# Patient Record
Sex: MALE | Race: WHITE | NOT HISPANIC OR LATINO | URBAN - METROPOLITAN AREA
[De-identification: names, ages, dates, MRNs, and addresses within clinical notes are randomized per-mention and may not be internally consistent; named-entity substitution may affect disease eponyms.]

---

## 2017-01-01 ENCOUNTER — COMMUNICATION - HEALTHEAST (OUTPATIENT)
Dept: NURSING | Facility: CLINIC | Age: 82
End: 2017-01-01

## 2017-01-01 ENCOUNTER — AMBULATORY - HEALTHEAST (OUTPATIENT)
Dept: LAB | Facility: CLINIC | Age: 82
End: 2017-01-01

## 2017-01-01 ENCOUNTER — OFFICE VISIT - HEALTHEAST (OUTPATIENT)
Dept: INTERNAL MEDICINE | Facility: CLINIC | Age: 82
End: 2017-01-01

## 2017-01-01 ENCOUNTER — COMMUNICATION - HEALTHEAST (OUTPATIENT)
Dept: PULMONOLOGY | Facility: OTHER | Age: 82
End: 2017-01-01

## 2017-01-01 ENCOUNTER — AMBULATORY - HEALTHEAST (OUTPATIENT)
Dept: PULMONOLOGY | Facility: OTHER | Age: 82
End: 2017-01-01

## 2017-01-01 ENCOUNTER — OFFICE VISIT - HEALTHEAST (OUTPATIENT)
Dept: CARDIOLOGY | Facility: CLINIC | Age: 82
End: 2017-01-01

## 2017-01-01 ENCOUNTER — COMMUNICATION - HEALTHEAST (OUTPATIENT)
Dept: CARDIOLOGY | Facility: CLINIC | Age: 82
End: 2017-01-01

## 2017-01-01 ENCOUNTER — COMMUNICATION - HEALTHEAST (OUTPATIENT)
Dept: ONCOLOGY | Facility: CLINIC | Age: 82
End: 2017-01-01

## 2017-01-01 ENCOUNTER — AMBULATORY - HEALTHEAST (OUTPATIENT)
Dept: CARDIOLOGY | Facility: CLINIC | Age: 82
End: 2017-01-01

## 2017-01-01 ENCOUNTER — RECORDS - HEALTHEAST (OUTPATIENT)
Dept: ADMINISTRATIVE | Facility: OTHER | Age: 82
End: 2017-01-01

## 2017-01-01 ENCOUNTER — COMMUNICATION - HEALTHEAST (OUTPATIENT)
Dept: INTERNAL MEDICINE | Facility: CLINIC | Age: 82
End: 2017-01-01

## 2017-01-01 ENCOUNTER — COMMUNICATION - HEALTHEAST (OUTPATIENT)
Dept: ADMINISTRATIVE | Facility: CLINIC | Age: 82
End: 2017-01-01

## 2017-01-01 ENCOUNTER — HOSPITAL ENCOUNTER (OUTPATIENT)
Dept: INTERVENTIONAL RADIOLOGY/VASCULAR | Facility: CLINIC | Age: 82
Discharge: HOME | End: 2017-04-24
Attending: RADIOLOGY

## 2017-01-01 ENCOUNTER — HOSPITAL ENCOUNTER (OUTPATIENT)
Dept: RADIOLOGY | Facility: CLINIC | Age: 82
Discharge: HOME | End: 2017-04-24
Attending: RADIOLOGY

## 2017-01-01 ENCOUNTER — OFFICE VISIT - HEALTHEAST (OUTPATIENT)
Dept: RADIATION ONCOLOGY | Age: 82
End: 2017-01-01

## 2017-01-01 ENCOUNTER — COMMUNICATION - HEALTHEAST (OUTPATIENT)
Dept: INTERVENTIONAL RADIOLOGY/VASCULAR | Facility: CLINIC | Age: 82
End: 2017-01-01

## 2017-01-01 ENCOUNTER — AMBULATORY - HEALTHEAST (OUTPATIENT)
Dept: ONCOLOGY | Facility: HOSPITAL | Age: 82
End: 2017-01-01

## 2017-01-01 ENCOUNTER — ANESTHESIA - HEALTHEAST (OUTPATIENT)
Dept: SURGERY | Facility: AMBULATORY SURGERY CENTER | Age: 82
End: 2017-01-01

## 2017-01-01 ENCOUNTER — COMMUNICATION - HEALTHEAST (OUTPATIENT)
Dept: SCHEDULING | Facility: CLINIC | Age: 82
End: 2017-01-01

## 2017-01-01 ENCOUNTER — AMBULATORY - HEALTHEAST (OUTPATIENT)
Dept: RADIATION ONCOLOGY | Age: 82
End: 2017-01-01

## 2017-01-01 ENCOUNTER — HOSPITAL ENCOUNTER (OUTPATIENT)
Dept: CT IMAGING | Facility: HOSPITAL | Age: 82
Discharge: HOME | End: 2017-03-06
Attending: INTERNAL MEDICINE

## 2017-01-01 ENCOUNTER — OFFICE VISIT - HEALTHEAST (OUTPATIENT)
Dept: RADIATION ONCOLOGY | Facility: HOSPITAL | Age: 82
End: 2017-01-01

## 2017-01-01 ENCOUNTER — AMBULATORY - HEALTHEAST (OUTPATIENT)
Dept: ONCOLOGY | Facility: CLINIC | Age: 82
End: 2017-01-01

## 2017-01-01 ENCOUNTER — COMMUNICATION - HEALTHEAST (OUTPATIENT)
Dept: RADIATION ONCOLOGY | Facility: HOSPITAL | Age: 82
End: 2017-01-01

## 2017-01-01 ENCOUNTER — AMBULATORY - HEALTHEAST (OUTPATIENT)
Dept: INFUSION THERAPY | Facility: HOSPITAL | Age: 82
End: 2017-01-01

## 2017-01-01 ENCOUNTER — HOSPITAL ENCOUNTER (OUTPATIENT)
Dept: PET IMAGING | Facility: HOSPITAL | Age: 82
Discharge: HOME | End: 2017-04-03
Attending: RADIOLOGY

## 2017-01-01 ENCOUNTER — COMMUNICATION - HEALTHEAST (OUTPATIENT)
Dept: MEDSURG UNIT | Facility: HOSPITAL | Age: 82
End: 2017-01-01

## 2017-01-01 ENCOUNTER — OFFICE VISIT - HEALTHEAST (OUTPATIENT)
Dept: PULMONOLOGY | Facility: OTHER | Age: 82
End: 2017-01-01

## 2017-01-01 ENCOUNTER — HOSPITAL ENCOUNTER (OUTPATIENT)
Dept: RADIOLOGY | Facility: CLINIC | Age: 82
Discharge: HOME | End: 2017-04-19
Attending: RADIOLOGY

## 2017-01-01 ENCOUNTER — OFFICE VISIT - HEALTHEAST (OUTPATIENT)
Dept: ONCOLOGY | Facility: HOSPITAL | Age: 82
End: 2017-01-01

## 2017-01-01 ENCOUNTER — COMMUNICATION - HEALTHEAST (OUTPATIENT)
Dept: ONCOLOGY | Facility: HOSPITAL | Age: 82
End: 2017-01-01

## 2017-01-01 ENCOUNTER — HOSPITAL ENCOUNTER (OUTPATIENT)
Dept: CT IMAGING | Facility: HOSPITAL | Age: 82
Setting detail: RADIATION/ONCOLOGY SERIES
Discharge: STILL ADMITTED OR ADMINISTRATIVE | End: 2017-12-06
Attending: INTERNAL MEDICINE

## 2017-01-01 ENCOUNTER — HOSPITAL ENCOUNTER (OUTPATIENT)
Dept: INTERVENTIONAL RADIOLOGY/VASCULAR | Facility: CLINIC | Age: 82
Discharge: HOME | End: 2017-04-19
Attending: RADIOLOGY

## 2017-01-01 ENCOUNTER — COMMUNICATION - HEALTHEAST (OUTPATIENT)
Dept: RADIATION ONCOLOGY | Age: 82
End: 2017-01-01

## 2017-01-01 ENCOUNTER — AMBULATORY - HEALTHEAST (OUTPATIENT)
Dept: INTERNAL MEDICINE | Facility: CLINIC | Age: 82
End: 2017-01-01

## 2017-01-01 ENCOUNTER — AMBULATORY - HEALTHEAST (OUTPATIENT)
Dept: RADIATION ONCOLOGY | Facility: HOSPITAL | Age: 82
End: 2017-01-01

## 2017-01-01 ENCOUNTER — COMMUNICATION - HEALTHEAST (OUTPATIENT)
Dept: ADMINISTRATIVE | Facility: HOSPITAL | Age: 82
End: 2017-01-01

## 2017-01-01 ENCOUNTER — AMBULATORY - HEALTHEAST (OUTPATIENT)
Dept: NURSING | Facility: CLINIC | Age: 82
End: 2017-01-01

## 2017-01-01 ENCOUNTER — HOSPITAL ENCOUNTER (OUTPATIENT)
Dept: CT IMAGING | Facility: HOSPITAL | Age: 82
Discharge: HOME | End: 2017-08-09
Attending: INTERNAL MEDICINE

## 2017-01-01 ENCOUNTER — HOSPITAL ENCOUNTER (OUTPATIENT)
Dept: CT IMAGING | Facility: CLINIC | Age: 82
Discharge: HOME | End: 2017-04-24
Attending: RADIOLOGY

## 2017-01-01 ENCOUNTER — HOSPITAL ENCOUNTER (OUTPATIENT)
Dept: CT IMAGING | Facility: CLINIC | Age: 82
Discharge: HOME | End: 2017-04-19
Attending: RADIOLOGY

## 2017-01-01 DIAGNOSIS — C34.91 SQUAMOUS CELL CARCINOMA OF RIGHT LUNG (H): ICD-10-CM

## 2017-01-01 DIAGNOSIS — I48.19 PERSISTENT ATRIAL FIBRILLATION (H): ICD-10-CM

## 2017-01-01 DIAGNOSIS — C34.90 MALIGNANT NEOPLASM OF LUNG, UNSPECIFIED LATERALITY, UNSPECIFIED PART OF LUNG (H): ICD-10-CM

## 2017-01-01 DIAGNOSIS — I50.33 ACUTE ON CHRONIC DIASTOLIC CONGESTIVE HEART FAILURE (H): ICD-10-CM

## 2017-01-01 DIAGNOSIS — J47.9 BRONCHIECTASIS WITHOUT COMPLICATION (H): ICD-10-CM

## 2017-01-01 DIAGNOSIS — R06.09 DYSPNEA ON EXERTION: ICD-10-CM

## 2017-01-01 DIAGNOSIS — C67.1 MALIGNANT NEOPLASM OF DOME OF URINARY BLADDER (H): ICD-10-CM

## 2017-01-01 DIAGNOSIS — B02.9 SHINGLES: ICD-10-CM

## 2017-01-01 DIAGNOSIS — J84.10 PULMONARY FIBROSIS (H): ICD-10-CM

## 2017-01-01 DIAGNOSIS — I50.32 CHRONIC DIASTOLIC CONGESTIVE HEART FAILURE (H): ICD-10-CM

## 2017-01-01 DIAGNOSIS — E78.5 HLD (HYPERLIPIDEMIA): ICD-10-CM

## 2017-01-01 DIAGNOSIS — I10 ESSENTIAL HYPERTENSION: ICD-10-CM

## 2017-01-01 DIAGNOSIS — J70.0 RADIATION PNEUMONITIS (H): ICD-10-CM

## 2017-01-01 DIAGNOSIS — J93.11 PRIMARY SPONTANEOUS PNEUMOTHORAX: ICD-10-CM

## 2017-01-01 DIAGNOSIS — C34.91 MALIGNANT NEOPLASM OF RIGHT LUNG, UNSPECIFIED PART OF LUNG (H): ICD-10-CM

## 2017-01-01 DIAGNOSIS — I50.30 HEART FAILURE WITH PRESERVED EJECTION FRACTION (H): ICD-10-CM

## 2017-01-01 DIAGNOSIS — E03.9 HYPOTHYROIDISM, UNSPECIFIED TYPE: ICD-10-CM

## 2017-01-01 DIAGNOSIS — I48.91 ATRIAL FIBRILLATION WITH RAPID VENTRICULAR RESPONSE (H): ICD-10-CM

## 2017-01-01 DIAGNOSIS — I50.30 DIASTOLIC CONGESTIVE HEART FAILURE (H): ICD-10-CM

## 2017-01-01 DIAGNOSIS — R91.8 MASS OF UPPER LOBE OF LEFT LUNG: ICD-10-CM

## 2017-01-01 DIAGNOSIS — C67.9 BLADDER CANCER (H): ICD-10-CM

## 2017-01-01 DIAGNOSIS — I50.9 HEART FAILURE (H): ICD-10-CM

## 2017-01-01 DIAGNOSIS — I10 ESSENTIAL HYPERTENSION WITH GOAL BLOOD PRESSURE LESS THAN 140/90: ICD-10-CM

## 2017-01-01 DIAGNOSIS — I25.10 CORONARY ARTERY DISEASE INVOLVING NATIVE CORONARY ARTERY OF NATIVE HEART WITHOUT ANGINA PECTORIS: ICD-10-CM

## 2017-01-01 DIAGNOSIS — E78.00 PURE HYPERCHOLESTEROLEMIA: ICD-10-CM

## 2017-01-01 DIAGNOSIS — D09.0 CIS (CARCINOMA IN SITU OF BLADDER): ICD-10-CM

## 2017-01-01 DIAGNOSIS — J84.9 INTERSTITIAL LUNG DISEASE (H): ICD-10-CM

## 2017-01-01 DIAGNOSIS — Z71.89 ADVANCED CARE PLANNING/COUNSELING DISCUSSION: ICD-10-CM

## 2017-01-01 DIAGNOSIS — Z79.01 LONG TERM (CURRENT) USE OF ANTICOAGULANTS: ICD-10-CM

## 2017-01-01 DIAGNOSIS — E78.5 HYPERLIPIDEMIA: ICD-10-CM

## 2017-01-01 DIAGNOSIS — R91.8 LUNG NODULES: ICD-10-CM

## 2017-01-01 DIAGNOSIS — Z09 HOSPITAL DISCHARGE FOLLOW-UP: ICD-10-CM

## 2017-01-01 DIAGNOSIS — R91.1 LUNG NODULE: ICD-10-CM

## 2017-01-01 DIAGNOSIS — C34.90 LUNG CANCER (H): ICD-10-CM

## 2017-01-01 DIAGNOSIS — R13.10 DYSPHAGIA, UNSPECIFIED TYPE: ICD-10-CM

## 2017-01-01 DIAGNOSIS — Z01.818 PREOP EXAMINATION: ICD-10-CM

## 2017-01-01 DIAGNOSIS — Z79.01 CHRONIC ANTICOAGULATION: ICD-10-CM

## 2017-01-01 DIAGNOSIS — K21.9 GASTROESOPHAGEAL REFLUX DISEASE WITHOUT ESOPHAGITIS: ICD-10-CM

## 2017-01-01 DIAGNOSIS — I48.20 CHRONIC ATRIAL FIBRILLATION (H): ICD-10-CM

## 2017-01-01 DIAGNOSIS — N18.30 CKD (CHRONIC KIDNEY DISEASE), STAGE III (H): ICD-10-CM

## 2017-01-01 DIAGNOSIS — Z00.00 HEALTH CARE MAINTENANCE: ICD-10-CM

## 2017-01-01 DIAGNOSIS — I10 HTN (HYPERTENSION): ICD-10-CM

## 2017-01-01 DIAGNOSIS — J70.0: ICD-10-CM

## 2017-01-01 LAB
ATRIAL RATE - MUSE: 108 BPM
DIASTOLIC BLOOD PRESSURE - MUSE: NORMAL MMHG
INTERPRETATION ECG - MUSE: NORMAL
LAB AP CHARGES (HE HISTORICAL CONVERSION): ABNORMAL
LAB AP INITIAL CYTO EVAL (HE HISTORICAL CONVERSION): ABNORMAL
LAB MED GENERAL PATH INTERP (HE HISTORICAL CONVERSION): ABNORMAL
P AXIS - MUSE: NORMAL DEGREES
PATH REPORT.COMMENTS IMP SPEC: ABNORMAL
PATH REPORT.COMMENTS IMP SPEC: ABNORMAL
PATH REPORT.FINAL DX SPEC: ABNORMAL
PATH REPORT.MICROSCOPIC SPEC OTHER STN: ABNORMAL
PATH REPORT.RELEVANT HX SPEC: ABNORMAL
PR INTERVAL - MUSE: NORMAL MS
QRS DURATION - MUSE: 90 MS
QT - MUSE: 350 MS
QTC - MUSE: 406 MS
R AXIS - MUSE: 11 DEGREES
SPECIMEN DESCRIPTION: ABNORMAL
SYSTOLIC BLOOD PRESSURE - MUSE: NORMAL MMHG
T AXIS - MUSE: 150 DEGREES
VENTRICULAR RATE- MUSE: 81 BPM

## 2017-01-01 ASSESSMENT — MIFFLIN-ST. JEOR
SCORE: 1534.47
SCORE: 1509.53
SCORE: 1514.07
SCORE: 1490.48
SCORE: 1518.6
SCORE: 1534.47
SCORE: 1523.59
SCORE: 1545.82
SCORE: 1557.15
SCORE: 1523.14
SCORE: 1500.46
SCORE: 1527.22
SCORE: 1539.47
SCORE: 1522
SCORE: 1482.32
SCORE: 1486.85

## 2017-01-03 ENCOUNTER — AMBULATORY - HEALTHEAST (OUTPATIENT)
Dept: LAB | Facility: CLINIC | Age: 82
End: 2017-01-03

## 2017-01-03 ENCOUNTER — COMMUNICATION - HEALTHEAST (OUTPATIENT)
Dept: NURSING | Facility: CLINIC | Age: 82
End: 2017-01-03

## 2017-01-03 DIAGNOSIS — I48.19 PERSISTENT ATRIAL FIBRILLATION (H): ICD-10-CM

## 2017-01-06 ENCOUNTER — AMBULATORY - HEALTHEAST (OUTPATIENT)
Dept: RADIATION ONCOLOGY | Age: 82
End: 2017-01-06

## 2017-01-06 ENCOUNTER — OFFICE VISIT - HEALTHEAST (OUTPATIENT)
Dept: RADIATION ONCOLOGY | Age: 82
End: 2017-01-06

## 2017-01-06 DIAGNOSIS — C34.91 SQUAMOUS CELL CARCINOMA OF RIGHT LUNG (H): ICD-10-CM

## 2017-01-06 ASSESSMENT — MIFFLIN-ST. JEOR: SCORE: 1497.29

## 2017-01-09 ENCOUNTER — AMBULATORY - HEALTHEAST (OUTPATIENT)
Dept: RADIATION ONCOLOGY | Age: 82
End: 2017-01-09

## 2017-01-10 ENCOUNTER — AMBULATORY - HEALTHEAST (OUTPATIENT)
Dept: RADIATION ONCOLOGY | Age: 82
End: 2017-01-10

## 2017-01-10 DIAGNOSIS — C34.91 SQUAMOUS CELL CARCINOMA OF RIGHT LUNG (H): ICD-10-CM

## 2017-01-13 ENCOUNTER — AMBULATORY - HEALTHEAST (OUTPATIENT)
Dept: LAB | Facility: CLINIC | Age: 82
End: 2017-01-13

## 2017-01-13 ENCOUNTER — COMMUNICATION - HEALTHEAST (OUTPATIENT)
Dept: NURSING | Facility: CLINIC | Age: 82
End: 2017-01-13

## 2017-01-13 DIAGNOSIS — I48.19 PERSISTENT ATRIAL FIBRILLATION (H): ICD-10-CM

## 2017-01-16 ENCOUNTER — COMMUNICATION - HEALTHEAST (OUTPATIENT)
Dept: RADIATION ONCOLOGY | Age: 82
End: 2017-01-16

## 2017-01-19 ENCOUNTER — AMBULATORY - HEALTHEAST (OUTPATIENT)
Dept: LAB | Facility: CLINIC | Age: 82
End: 2017-01-19

## 2017-01-19 ENCOUNTER — COMMUNICATION - HEALTHEAST (OUTPATIENT)
Dept: NURSING | Facility: CLINIC | Age: 82
End: 2017-01-19

## 2017-01-19 DIAGNOSIS — I48.19 PERSISTENT ATRIAL FIBRILLATION (H): ICD-10-CM

## 2017-01-25 ENCOUNTER — AMBULATORY - HEALTHEAST (OUTPATIENT)
Dept: LAB | Facility: CLINIC | Age: 82
End: 2017-01-25

## 2017-01-25 ENCOUNTER — COMMUNICATION - HEALTHEAST (OUTPATIENT)
Dept: NURSING | Facility: CLINIC | Age: 82
End: 2017-01-25

## 2017-01-25 DIAGNOSIS — I48.19 PERSISTENT ATRIAL FIBRILLATION (H): ICD-10-CM

## 2017-01-30 ENCOUNTER — OFFICE VISIT - HEALTHEAST (OUTPATIENT)
Dept: PULMONOLOGY | Facility: OTHER | Age: 82
End: 2017-01-30

## 2017-01-30 ENCOUNTER — AMBULATORY - HEALTHEAST (OUTPATIENT)
Dept: LAB | Facility: CLINIC | Age: 82
End: 2017-01-30

## 2017-01-30 ENCOUNTER — COMMUNICATION - HEALTHEAST (OUTPATIENT)
Dept: NURSING | Facility: CLINIC | Age: 82
End: 2017-01-30

## 2017-01-30 DIAGNOSIS — I48.19 PERSISTENT ATRIAL FIBRILLATION (H): ICD-10-CM

## 2017-01-30 DIAGNOSIS — J84.10 PULMONARY FIBROSIS (H): ICD-10-CM

## 2017-01-30 DIAGNOSIS — C34.90 LUNG CANCER (H): ICD-10-CM

## 2017-02-03 ENCOUNTER — COMMUNICATION - HEALTHEAST (OUTPATIENT)
Dept: NURSING | Facility: CLINIC | Age: 82
End: 2017-02-03

## 2017-02-03 ENCOUNTER — AMBULATORY - HEALTHEAST (OUTPATIENT)
Dept: LAB | Facility: CLINIC | Age: 82
End: 2017-02-03

## 2017-02-03 DIAGNOSIS — I48.19 PERSISTENT ATRIAL FIBRILLATION (H): ICD-10-CM

## 2017-02-09 ENCOUNTER — AMBULATORY - HEALTHEAST (OUTPATIENT)
Dept: LAB | Facility: CLINIC | Age: 82
End: 2017-02-09

## 2017-02-09 ENCOUNTER — COMMUNICATION - HEALTHEAST (OUTPATIENT)
Dept: NURSING | Facility: CLINIC | Age: 82
End: 2017-02-09

## 2017-02-09 DIAGNOSIS — I48.19 PERSISTENT ATRIAL FIBRILLATION (H): ICD-10-CM

## 2017-02-16 ENCOUNTER — COMMUNICATION - HEALTHEAST (OUTPATIENT)
Dept: NURSING | Facility: CLINIC | Age: 82
End: 2017-02-16

## 2017-02-16 ENCOUNTER — AMBULATORY - HEALTHEAST (OUTPATIENT)
Dept: LAB | Facility: CLINIC | Age: 82
End: 2017-02-16

## 2017-02-16 DIAGNOSIS — I48.19 PERSISTENT ATRIAL FIBRILLATION (H): ICD-10-CM

## 2017-02-17 ENCOUNTER — AMBULATORY - HEALTHEAST (OUTPATIENT)
Dept: PULMONOLOGY | Facility: OTHER | Age: 82
End: 2017-02-17

## 2017-02-17 ENCOUNTER — COMMUNICATION - HEALTHEAST (OUTPATIENT)
Dept: INTERNAL MEDICINE | Facility: CLINIC | Age: 82
End: 2017-02-17

## 2017-02-17 DIAGNOSIS — I48.91 ATRIAL FIBRILLATION WITH RAPID VENTRICULAR RESPONSE (H): ICD-10-CM

## 2017-02-17 DIAGNOSIS — J84.10 PULMONARY FIBROSIS (H): ICD-10-CM

## 2017-02-23 ENCOUNTER — COMMUNICATION - HEALTHEAST (OUTPATIENT)
Dept: INTERNAL MEDICINE | Facility: CLINIC | Age: 82
End: 2017-02-23

## 2017-02-23 ENCOUNTER — AMBULATORY - HEALTHEAST (OUTPATIENT)
Dept: LAB | Facility: CLINIC | Age: 82
End: 2017-02-23

## 2017-02-23 ENCOUNTER — AMBULATORY - HEALTHEAST (OUTPATIENT)
Dept: PULMONOLOGY | Facility: OTHER | Age: 82
End: 2017-02-23

## 2017-02-23 DIAGNOSIS — I48.19 PERSISTENT ATRIAL FIBRILLATION (H): ICD-10-CM

## 2017-02-23 DIAGNOSIS — J84.10 PULMONARY FIBROSIS (H): ICD-10-CM

## 2017-02-23 DIAGNOSIS — I48.91 ATRIAL FIBRILLATION WITH RAPID VENTRICULAR RESPONSE (H): ICD-10-CM

## 2018-01-01 ENCOUNTER — HOME CARE/HOSPICE - HEALTHEAST (OUTPATIENT)
Dept: HOME HEALTH SERVICES | Facility: HOME HEALTH | Age: 83
End: 2018-01-01

## 2018-01-01 ENCOUNTER — COMMUNICATION - HEALTHEAST (OUTPATIENT)
Dept: ONCOLOGY | Facility: CLINIC | Age: 83
End: 2018-01-01

## 2018-01-01 ENCOUNTER — COMMUNICATION - HEALTHEAST (OUTPATIENT)
Dept: PULMONOLOGY | Facility: OTHER | Age: 83
End: 2018-01-01

## 2018-01-01 ENCOUNTER — HOME CARE/HOSPICE - HEALTHEAST (OUTPATIENT)
Dept: HOSPICE | Facility: HOSPICE | Age: 83
End: 2018-01-01

## 2018-01-01 ENCOUNTER — OFFICE VISIT - HEALTHEAST (OUTPATIENT)
Dept: GERIATRICS | Facility: CLINIC | Age: 83
End: 2018-01-01

## 2018-01-01 ENCOUNTER — RECORDS - HEALTHEAST (OUTPATIENT)
Dept: ADMINISTRATIVE | Facility: OTHER | Age: 83
End: 2018-01-01

## 2018-01-01 ENCOUNTER — COMMUNICATION - HEALTHEAST (OUTPATIENT)
Dept: GERIATRICS | Facility: CLINIC | Age: 83
End: 2018-01-01

## 2018-01-01 ENCOUNTER — AMBULATORY - HEALTHEAST (OUTPATIENT)
Dept: INTERNAL MEDICINE | Facility: CLINIC | Age: 83
End: 2018-01-01

## 2018-01-01 ENCOUNTER — COMMUNICATION - HEALTHEAST (OUTPATIENT)
Dept: CARDIOLOGY | Facility: CLINIC | Age: 83
End: 2018-01-01

## 2018-01-01 ENCOUNTER — COMMUNICATION - HEALTHEAST (OUTPATIENT)
Dept: HOME HEALTH SERVICES | Facility: HOME HEALTH | Age: 83
End: 2018-01-01

## 2018-01-01 ENCOUNTER — COMMUNICATION - HEALTHEAST (OUTPATIENT)
Dept: INTERNAL MEDICINE | Facility: CLINIC | Age: 83
End: 2018-01-01

## 2018-01-01 ENCOUNTER — AMBULATORY - HEALTHEAST (OUTPATIENT)
Dept: NURSING | Facility: CLINIC | Age: 83
End: 2018-01-01

## 2018-01-01 ENCOUNTER — OFFICE VISIT - HEALTHEAST (OUTPATIENT)
Dept: PULMONOLOGY | Facility: OTHER | Age: 83
End: 2018-01-01

## 2018-01-01 ENCOUNTER — RECORDS - HEALTHEAST (OUTPATIENT)
Dept: LAB | Facility: CLINIC | Age: 83
End: 2018-01-01

## 2018-01-01 ENCOUNTER — COMMUNICATION - HEALTHEAST (OUTPATIENT)
Dept: SCHEDULING | Facility: CLINIC | Age: 83
End: 2018-01-01

## 2018-01-01 ENCOUNTER — COMMUNICATION - HEALTHEAST (OUTPATIENT)
Dept: NURSING | Facility: CLINIC | Age: 83
End: 2018-01-01

## 2018-01-01 ENCOUNTER — AMBULATORY - HEALTHEAST (OUTPATIENT)
Dept: LAB | Facility: CLINIC | Age: 83
End: 2018-01-01

## 2018-01-01 ENCOUNTER — AMBULATORY - HEALTHEAST (OUTPATIENT)
Dept: CARDIOLOGY | Facility: CLINIC | Age: 83
End: 2018-01-01

## 2018-01-01 ENCOUNTER — AMBULATORY - HEALTHEAST (OUTPATIENT)
Dept: HOSPICE | Facility: HOSPICE | Age: 83
End: 2018-01-01

## 2018-01-01 ENCOUNTER — OFFICE VISIT - HEALTHEAST (OUTPATIENT)
Dept: CARDIOLOGY | Facility: CLINIC | Age: 83
End: 2018-01-01

## 2018-01-01 ENCOUNTER — AMBULATORY - HEALTHEAST (OUTPATIENT)
Dept: GERIATRICS | Facility: CLINIC | Age: 83
End: 2018-01-01

## 2018-01-01 ENCOUNTER — SURGERY - HEALTHEAST (OUTPATIENT)
Dept: SURGERY | Facility: HOSPITAL | Age: 83
End: 2018-01-01

## 2018-01-01 ENCOUNTER — ANESTHESIA - HEALTHEAST (OUTPATIENT)
Dept: SURGERY | Facility: HOSPITAL | Age: 83
End: 2018-01-01

## 2018-01-01 ENCOUNTER — SURGERY - HEALTHEAST (OUTPATIENT)
Dept: CARDIOLOGY | Facility: CLINIC | Age: 83
End: 2018-01-01

## 2018-01-01 ENCOUNTER — OFFICE VISIT - HEALTHEAST (OUTPATIENT)
Dept: INTERNAL MEDICINE | Facility: CLINIC | Age: 83
End: 2018-01-01

## 2018-01-01 DIAGNOSIS — J84.10 PULMONARY FIBROSIS (H): ICD-10-CM

## 2018-01-01 DIAGNOSIS — I48.21 PERMANENT ATRIAL FIBRILLATION (H): ICD-10-CM

## 2018-01-01 DIAGNOSIS — I95.9 HYPOTENSION: ICD-10-CM

## 2018-01-01 DIAGNOSIS — C34.90 MALIGNANT NEOPLASM OF LUNG, UNSPECIFIED LATERALITY, UNSPECIFIED PART OF LUNG (H): ICD-10-CM

## 2018-01-01 DIAGNOSIS — R06.09 DYSPNEA ON EXERTION: ICD-10-CM

## 2018-01-01 DIAGNOSIS — I49.5 TACHYCARDIA-BRADYCARDIA (H): ICD-10-CM

## 2018-01-01 DIAGNOSIS — R00.1 BRADYCARDIA: ICD-10-CM

## 2018-01-01 DIAGNOSIS — Z95.0 STATUS POST PLACEMENT OF CARDIAC PACEMAKER: ICD-10-CM

## 2018-01-01 DIAGNOSIS — I48.19 PERSISTENT ATRIAL FIBRILLATION (H): ICD-10-CM

## 2018-01-01 DIAGNOSIS — J70.0 RADIATION PNEUMONITIS (H): ICD-10-CM

## 2018-01-01 DIAGNOSIS — E78.00 PURE HYPERCHOLESTEROLEMIA: ICD-10-CM

## 2018-01-01 DIAGNOSIS — I50.30 HEART FAILURE WITH PRESERVED EJECTION FRACTION (H): ICD-10-CM

## 2018-01-01 DIAGNOSIS — R33.9 URINE RETENTION: ICD-10-CM

## 2018-01-01 DIAGNOSIS — Z51.81 ANTICOAGULATION MANAGEMENT ENCOUNTER: ICD-10-CM

## 2018-01-01 DIAGNOSIS — C34.91 SQUAMOUS CELL LUNG CANCER, RIGHT (H): ICD-10-CM

## 2018-01-01 DIAGNOSIS — I10 HYPERTENSION: ICD-10-CM

## 2018-01-01 DIAGNOSIS — S51.812A SKIN TEAR OF FOREARM WITHOUT COMPLICATION, LEFT, INITIAL ENCOUNTER: ICD-10-CM

## 2018-01-01 DIAGNOSIS — Z79.01 ANTICOAGULATION MANAGEMENT ENCOUNTER: ICD-10-CM

## 2018-01-01 DIAGNOSIS — K21.9 GASTROESOPHAGEAL REFLUX DISEASE WITHOUT ESOPHAGITIS: ICD-10-CM

## 2018-01-01 DIAGNOSIS — I48.91 ATRIAL FIBRILLATION (H): ICD-10-CM

## 2018-01-01 DIAGNOSIS — R06.09 DOE (DYSPNEA ON EXERTION): ICD-10-CM

## 2018-01-01 DIAGNOSIS — Z95.0 CARDIAC PACEMAKER IN SITU: ICD-10-CM

## 2018-01-01 DIAGNOSIS — I95.1 ORTHOSTATIC HYPOTENSION: ICD-10-CM

## 2018-01-01 DIAGNOSIS — I25.10 CORONARY ARTERY DISEASE INVOLVING NATIVE CORONARY ARTERY OF NATIVE HEART WITHOUT ANGINA PECTORIS: ICD-10-CM

## 2018-01-01 DIAGNOSIS — R06.09 EXERTIONAL DYSPNEA: ICD-10-CM

## 2018-01-01 DIAGNOSIS — R53.1 WEAKNESS: ICD-10-CM

## 2018-01-01 DIAGNOSIS — R06.02 SHORTNESS OF BREATH: ICD-10-CM

## 2018-01-01 DIAGNOSIS — I49.5 TACHYCARDIA-BRADYCARDIA SYNDROME (H): ICD-10-CM

## 2018-01-01 DIAGNOSIS — I10 ESSENTIAL HYPERTENSION: ICD-10-CM

## 2018-01-01 DIAGNOSIS — I50.32 CHRONIC DIASTOLIC CHF (CONGESTIVE HEART FAILURE), NYHA CLASS 3 (H): ICD-10-CM
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
## 2018-01-01 LAB
DIGOXIN LEVEL LHE- HISTORICAL: 1.3 NG/ML (ref 0.5–2)
HCC DEVICE COMMENTS: NORMAL
HCC DEVICE COMMENTS: NORMAL
INR PPP: 2 (ref 0.9–1.1)
INR PPP: 2.1 (ref 0.9–1.1)
INR PPP: 2.2 (ref 0.9–1.1)
INR PPP: 2.5 (ref 0.9–1.1)
INR PPP: 4.4 (ref 0.9–1.1)

## 2018-01-01 RX ORDER — SORBITOL SOLUTION 70 %
20 SOLUTION, ORAL MISCELLANEOUS 2 TIMES DAILY
Status: SHIPPED | COMMUNITY
Start: 2018-01-01

## 2018-01-01 RX ORDER — HALOPERIDOL 2 MG/ML
0.5-2 SOLUTION ORAL EVERY 4 HOURS PRN
Status: SHIPPED | COMMUNITY
Start: 2018-01-01

## 2018-01-01 RX ORDER — HYDROMORPHONE HYDROCHLORIDE 1 MG/ML
3 SOLUTION ORAL EVERY 4 HOURS
Status: SHIPPED | COMMUNITY
Start: 2018-01-01

## 2018-01-01 RX ORDER — LORAZEPAM 0.5 MG/1
0.5 TABLET ORAL EVERY 4 HOURS PRN
Status: SHIPPED | COMMUNITY
Start: 2018-01-01

## 2018-01-01 RX ORDER — HYDROMORPHONE HYDROCHLORIDE 1 MG/ML
3 SOLUTION ORAL
Status: SHIPPED | COMMUNITY
Start: 2018-01-01

## 2018-01-01 RX ORDER — BISACODYL 10 MG
10 SUPPOSITORY, RECTAL RECTAL DAILY PRN
Status: SHIPPED | COMMUNITY
Start: 2018-01-01

## 2018-01-01 RX ORDER — ATROPINE SULFATE 10 MG/ML
2 SOLUTION/ DROPS OPHTHALMIC EVERY 4 HOURS PRN
Status: SHIPPED | COMMUNITY
Start: 2018-01-01

## 2018-01-01 ASSESSMENT — MIFFLIN-ST. JEOR
SCORE: 1450.57
SCORE: 1436.96
SCORE: 1446.03
SCORE: 1491.39

## 2018-03-23 ENCOUNTER — COMMUNICATION - HEALTHEAST (OUTPATIENT)
Dept: NURSING | Facility: CLINIC | Age: 83
End: 2018-03-23

## 2021-05-27 ENCOUNTER — RECORDS - HEALTHEAST (OUTPATIENT)
Dept: ADMINISTRATIVE | Facility: CLINIC | Age: 86
End: 2021-05-27

## 2021-05-30 VITALS — BODY MASS INDEX: 26.04 KG/M2 | HEIGHT: 71 IN | WEIGHT: 186 LBS

## 2021-05-30 VITALS — BODY MASS INDEX: 25.47 KG/M2 | WEIGHT: 181.9 LBS | HEIGHT: 71 IN

## 2021-05-30 VITALS — WEIGHT: 175.3 LBS | BODY MASS INDEX: 24.54 KG/M2 | HEIGHT: 71 IN

## 2021-05-30 VITALS — WEIGHT: 185 LBS | HEIGHT: 72 IN | BODY MASS INDEX: 25.06 KG/M2

## 2021-05-30 VITALS — BODY MASS INDEX: 24.06 KG/M2 | HEIGHT: 72 IN | WEIGHT: 177.6 LBS

## 2021-05-30 VITALS — WEIGHT: 185.6 LBS | BODY MASS INDEX: 25.89 KG/M2

## 2021-05-30 VITALS — WEIGHT: 185.2 LBS | BODY MASS INDEX: 25.83 KG/M2

## 2021-05-30 VITALS — WEIGHT: 180 LBS | HEIGHT: 72 IN | BODY MASS INDEX: 24.38 KG/M2

## 2021-05-30 VITALS — BODY MASS INDEX: 25.34 KG/M2 | WEIGHT: 181 LBS | HEIGHT: 71 IN

## 2021-05-30 VITALS — WEIGHT: 184.6 LBS | HEIGHT: 71 IN | BODY MASS INDEX: 25.84 KG/M2

## 2021-05-30 VITALS — WEIGHT: 188.4 LBS | BODY MASS INDEX: 25.55 KG/M2

## 2021-05-30 VITALS — BODY MASS INDEX: 25.84 KG/M2 | WEIGHT: 185.3 LBS

## 2021-05-31 VITALS — WEIGHT: 173.9 LBS | BODY MASS INDEX: 23.92 KG/M2

## 2021-05-31 VITALS — HEIGHT: 71 IN | BODY MASS INDEX: 24.33 KG/M2 | WEIGHT: 173.8 LBS

## 2021-05-31 VITALS — BODY MASS INDEX: 25.2 KG/M2 | HEIGHT: 71 IN | WEIGHT: 180 LBS

## 2021-05-31 VITALS — HEIGHT: 71 IN | BODY MASS INDEX: 24.22 KG/M2 | WEIGHT: 173 LBS

## 2021-05-31 VITALS — WEIGHT: 179 LBS | HEIGHT: 71 IN | BODY MASS INDEX: 25.06 KG/M2

## 2021-05-31 VITALS — BODY MASS INDEX: 23.45 KG/M2 | WEIGHT: 168.1 LBS

## 2021-05-31 VITALS — WEIGHT: 185.3 LBS | BODY MASS INDEX: 25.84 KG/M2

## 2021-05-31 VITALS — WEIGHT: 184.6 LBS | BODY MASS INDEX: 25.75 KG/M2

## 2021-05-31 VITALS — BODY MASS INDEX: 22.94 KG/M2 | WEIGHT: 164.5 LBS | WEIGHT: 164.5 LBS | BODY MASS INDEX: 22.94 KG/M2

## 2021-05-31 VITALS — HEIGHT: 71 IN | BODY MASS INDEX: 23.91 KG/M2 | BODY MASS INDEX: 23.91 KG/M2 | HEIGHT: 71 IN

## 2021-05-31 VITALS — BODY MASS INDEX: 23.91 KG/M2 | WEIGHT: 171.4 LBS

## 2021-05-31 VITALS — WEIGHT: 172 LBS | HEIGHT: 71 IN | BODY MASS INDEX: 24.08 KG/M2

## 2021-05-31 VITALS — WEIGHT: 179 LBS | BODY MASS INDEX: 24.24 KG/M2 | HEIGHT: 72 IN

## 2021-05-31 VITALS — BODY MASS INDEX: 24.27 KG/M2 | WEIGHT: 174 LBS

## 2021-05-31 VITALS — BODY MASS INDEX: 24.83 KG/M2 | WEIGHT: 178 LBS

## 2021-05-31 VITALS — BODY MASS INDEX: 24.64 KG/M2 | HEIGHT: 71 IN | WEIGHT: 176 LBS

## 2021-05-31 VITALS — BODY MASS INDEX: 22.96 KG/M2 | HEIGHT: 71 IN | WEIGHT: 164 LBS

## 2021-05-31 VITALS — WEIGHT: 178 LBS | HEIGHT: 71 IN | BODY MASS INDEX: 24.7 KG/M2 | BODY MASS INDEX: 24.92 KG/M2 | WEIGHT: 177.1 LBS

## 2021-05-31 VITALS — BODY MASS INDEX: 22.87 KG/M2 | WEIGHT: 164 LBS

## 2021-05-31 VITALS — BODY MASS INDEX: 24.42 KG/M2 | WEIGHT: 175.1 LBS

## 2021-05-31 VITALS — WEIGHT: 160.6 LBS | BODY MASS INDEX: 22.4 KG/M2

## 2021-05-31 VITALS — WEIGHT: 172 LBS | BODY MASS INDEX: 23.99 KG/M2

## 2021-06-01 ENCOUNTER — RECORDS - HEALTHEAST (OUTPATIENT)
Dept: ADMINISTRATIVE | Facility: CLINIC | Age: 86
End: 2021-06-01

## 2021-06-01 VITALS — HEIGHT: 71 IN | BODY MASS INDEX: 22.68 KG/M2 | WEIGHT: 162 LBS

## 2021-06-01 VITALS — BODY MASS INDEX: 24.36 KG/M2 | WEIGHT: 174 LBS | HEIGHT: 71 IN

## 2021-06-01 VITALS — BODY MASS INDEX: 23.1 KG/M2 | HEIGHT: 71 IN | WEIGHT: 165 LBS

## 2021-06-01 VITALS — BODY MASS INDEX: 24.27 KG/M2 | WEIGHT: 174 LBS

## 2021-06-01 VITALS — WEIGHT: 172.4 LBS | BODY MASS INDEX: 24.04 KG/M2

## 2021-06-02 ENCOUNTER — RECORDS - HEALTHEAST (OUTPATIENT)
Dept: ADMINISTRATIVE | Facility: CLINIC | Age: 86
End: 2021-06-02

## 2021-06-08 NOTE — PROGRESS NOTES
Cyberknife  Treatment Summary    Kartik Tam   [id number]  1935  Care Provider: Joan Patel    Date of Service: 1/10/2017      HISTORY: Kartik Tam was treated with Cyberknife stereotactic radiosurgery    SITE TREATED: Right lower lobe lung cancer  TOTAL DOSE: 5000 cGy  NUMBER OF FRACTION: 5  DATES COMPLETED: December 28, 2016 through January 9, 2017    SITE TREATED: Right upper lobe lung cancer  TOTAL DOSE: 4800 cGy  NUMBER OF FRACTION: 4  DATES COMPLETED: December 16, 2016 through December 23, 2016    Kartik tolerated the treatment without unexpected sides effects.          PLAN: Discharge instructions were given and Kartik knows to call if questions/issues arise. Kartik will be seen in f/u in 3 months with a scan.

## 2021-06-08 NOTE — PROGRESS NOTES
"  RADIATION ONCOLOGY WEEKLY TREATMENT VISIT NOTE        Assessment / Impression       1. Squamous cell carcinoma of right lung       Squamous cell carcinoma of right lung    Staging form: Lung, AJCC 7th Edition      Clinical: No stage assigned - Unsigned      Pathologic stage from 11/7/2016: Stage IIIA (T4, N0, cM0) - Signed by John Andre MD on 11/7/2016    Tolerating radiation therapy well.  All questions and concerns addressed.      Plan:     Continue radiation treatment as prescribed.  Radiation: Site: RLL  Stereotactic Radiosurgery: Yes  Stereotactic Radiosurgery date: 01/06/17  Concurrent Therapy: No  Today's Dose: 4000  Total Dose for Thoracic: 5000  Today's Fraction/Total Fraction Thoracic: 4/5      Subjective:      HPI: Kartik Tam is a 81 y.o. male with    1. Squamous cell carcinoma of right lung         The following portions of the patient's history were reviewed and updated as appropriate: allergies, current medications, past family history, past medical history, past social history, past surgical history and problem list.      Objective:    Exam:     General: Alert and oriented, in no acute distress  Vitals:    01/06/17 1048   BP: 159/90   Pulse: (!) 53   Temp: 97  F (36.1  C)   TempSrc: Oral   SpO2: 100%   Weight: 175 lb 4.8 oz (79.5 kg)   Height: 5' 11\" (1.803 m)     Kartik has no Erythema.    Labs:  Results for orders placed or performed in visit on 01/03/17   INR   Result Value Ref Range    INR 2.50 (H) 0.90 - 1.10         Treatment Summary to Date    Aria chart and setup information reviewed    Joan Patel MD   "

## 2021-06-08 NOTE — PROGRESS NOTES
PULMONARY OUTPATIENT FOLLOW UP NOTE    Assessment:     1. Lung cancer  PET (+) RLL mass, RUL and LLL nodules. (+) mediastinal adenopathy.   S/p EBUS with negative pathology results.   S/p wedge biopsy of RLL mass and RUL nodule, path (+) Squamous cell lung cancer. Stage IIIA  S/p stereotactic radiosurgery.  Follow up PET CT scan 4/2017  2. Pulmonary fibrosis  Lung biopsy was positive for usual interstitial pneumonia. Labs showed (+) TETE, elevated anti Sm antibody  PFTs showed mild to moderate restrictive lung disease and moderate reduction of DLCO. No exertional hypoxia   Continue prednisone 5 mg daily  3. Stable loculated pneumothorax at the staple site in RUL post wedge biopsy   4. Carcinoma in situ of bladder   5. Ischemic cardiomyopathy s/p drug eluting stent Synergy proximal LAD on 8/2/2016  6. Paroxysmal atrial fibrillation anticoagulated  7. Hx hypothyroidism     Plan:   1. Prednisone 5 mg daily  2. Continue albuterol HFA as needed  3. Follow up in 6 months     Chad Pham  Pulmonary / Critical Care  1/30/2017    CC:      Chief Complaint   Patient presents with     Follow Up     lung CA/pulm. fibrosis       HPI:       Kartik Tam is an 81 y.o. male who presents for follow up.  Pt has history of ischemic cardiomyopathy s/p stent proximal LAD 8/2/2016, atrial fibrillation on coumadin, hypothyroidism, carcinomal in situ bladder of bladder, squamous cell lung cancer stage IIIA s/p radiation therapy, pulmonary fibrosis.   Patient finished course of radiation therapy. Doing well. Steroids were taper to 10 mg daily. Stopped using LABA/ICS , pt denies any significant change in breathing.    Reports no limitation with activities , able to walk for one hour without stop, denies cough or wheezes.   Denies chest pain, orthopnea, PND or swelling of LEs.   Reports occasionally feeling off balance when standing up.   Adequate appetite.   Denies acid reflux symptoms. No postnasal drip.   Pt reports  previous tobacco user, 1-2 ppd for 30 years, quit 35 years ago. Worked in sales.  Family hx significant for two brothers with malignancy, one with throat cancer and other with colon cancer.     Past Medical History   Diagnosis Date     A-fib 12/5/2014     Has had hx of in the past     Bronchiectasis RLL      Cancer      Hx. of Bladder Cancer     Cataract      Colon polyp      Coronary artery disease      Dementia?      DJD (degenerative joint disease) hips      Hearing impaired + tinnitus      Herpes      Hyperlipidemia      Hypertension      Hypothyroidism      hypo     Lung cancer      Lung nodule      RLL     Micturition syncope      Osteopenia      Pneumothorax      Pulmonary fibrosis      Medications:     Prior to Admission medications    Medication Sig Start Date End Date Taking? Authorizing Provider   dronedarone (MULTAQ) 400 mg tablet Take 1 tablet (400 mg total) by mouth daily. 10/12/15  Yes Agnes Hicks MD   LEVOTHYROXINE SODIUM (LEVOTHYROXINE, BULK, MISC) Take 150 mcg by mouth every morning.    Yes Historical Provider, MD   simvastatin (ZOCOR) 20 MG tablet Take 20 mg by mouth bedtime.   Yes Historical Provider, MD   warfarin (COUMADIN) 5 MG tablet Please take 5 g on 12/6/2014 and 12/7/2014. 12/6/14 12/6/15 Yes Ilana aJrrett MD   doxycycline (VIBRAMYCIN) 100 MG capsule Take 1 capsule (100 mg total) by mouth 2 (two) times a day. 12/1/15 12/8/15  Chad Pham MD     Social History     Social History     Marital status:      Spouse name: N/A     Number of children: 3     Years of education: N/A     Occupational History     Not on file.     Social History Main Topics     Smoking status: Former Smoker     Packs/day: 3.00     Years: 30.00     Quit date: 12/5/1979     Smokeless tobacco: Never Used     Alcohol use No      Comment: alcoholism in the past, quit drinking 1972      Drug use: No     Sexual activity: No     Other Topics Concern     Not on file     Social History  Narrative     Family History   Problem Relation Age of Onset     Diabetes Mother      Heart disease Mother      No Medical Problems Father      Colon cancer Brother      Throat cancer Brother      Review of Systems  A 12 point comprehensive review of systems was negative except as noted.      Objective:     Vitals:    01/30/17 0806   BP: 96/58   Pulse: 80   Resp: 24   SpO2: 100%   Weight: 185 lb 3.2 oz (84 kg)     Gen: awake, alert, no distress  HEENT: pink conjunctiva, moist mucosa, Mallampati II/IV  Neck: no thyromegaly, masses or JVD  Lungs: discrete inspiratory crackles  CV: regular, no murmurs or gallops appreciated  Abdomen: soft, NT, BS wnl  Ext: no edema  Neuro: CN II-XII intact, non focal      Diagnostic tests:     LABS    TETE (+)  RF negative  Smith autoantibodies (+)  Aspergillus fumigatus IgG (+)    BAL cell count Neutrophils 36%, Lymphocytes 8%, Macrophages 50% Eosinophils 6%  BAL cultures negative for fungal/mycobacterial infection.      BRONCHOSCOPY / EBUS - CYTOLOGY 1/22/2016   A) LYMPH NODE, 4R, ENDOBRONCHIAL ULTRASOUND-GUIDED FINE NEEDLE      ASPIRATION FOR CYTOLOGIC EXAM:       - LYMPHOID TISSUE PRESENT, NEGATIVE FOR TUMOR     B) LYMPH NODE, SUBCARINAL, ENDOBRONCHIAL ULTRASOUND-GUIDED FINE      NEEDLE ASPIRATION FOR CYTOLOGIC EXAM:       - LYMPHOID TISSUE PRESENT, NEGATIVE FOR TUMOR     C) LYMPH NODE, 11R, ENDOBRONCHIAL ULTRASOUND-GUIDED FINE NEEDLE      ASPIRATION FOR CYTOLOGIC EXAM:       - SCANT LYMPHOID TISSUE OBTAINED, NEGATIVE FOR MALIGNANCY     D) LUNG, LOWER RIGHT LOBE, BRONCHIAL ALVEOLAR LAVAGE FOR      CYTOLOGIC EXAM:       1) NEGATIVE FOR MALIGNANT CELLS       2) NUMEROUS ALVEOLAR MACROPHAGES AND OCCASIONAL BRONCHIAL          CELLS ARE PRESENT    CT GUIDED BX - RIGHT LUNG MASS 1/29/16     - RARE ATYPICAL BRONCHIAL CELLS    CT CHEST WO CONTRAST  12/4/2015 11:32 AM   LUNGS AND PLEURA: Patient has mild, peripheral based interstitial fibrosis, bronchiectasis and minimal areas of  honeycombing. This has a peripheral and basilar distribution but with sparing the of subpleural lung. In addition, there is a focal 3 x 3 x 2 cm ovoid subpleural area of more extensive cystic bronchiectasis and peribronchiolar thickening in the right lower lobe that has not changed in the short interval (but progressed since 2010). Similary but smaller focal area of bronchiectasis noted   further superiorly in the right upper lobe and medially in the left lower lobe (coronal images 120-109). No air-fluid levels or an obvious mass/soft tissue component. Calcified granulomas noted in the right lower lobe. No change in the 4 x 2 mm ovoid   nodule medial to the focal bronchiectasis (series 3 image 89). There are two nodules in the left upper lobe/lingula, largest measuring 4-5mm and subpleural in location (image60).Tiny groundglass nodule in the left lower lobe is also noted (series 3   image 80).   MEDIASTINUM: Multiple small mediastinal nodes are noted measuring a centimeter in short axis. There are coronary artery calcifications.   LIMITED UPPER ABDOMEN: Unremarkable.   MUSCULOSKELETAL: No suspicious lesions on bone windows.   IMPRESSION:   1. Patient has interstitial lung disease with a distribution suggestive of NSIP. In addition there are more focal areas of bronchiectasis and peribronchiolar thickening as noted above, largest in the right lower lobe. No air-fluid levels, mucus plugging or obvious soft tissue mass. While this may be inflammatory/infectious in nature, these patients are at higher risk for malignancies as well. Suggest pulmonary consultation for further evaluation.   2. Few pulmonary nodules as noted above.   3. Mildly prominent mediastinal nodes are likely reactive.   4. Coronary artery calcifications.    PET FDG/CT 1/15/2016 8:47 AM   INDICATION: Pulmonary nodule   COMPARISON: CTs from 12/04/2015, 11/30/2015, and 11/29/2010   FINDINGS:   HEAD AND NECK: Marked generalized cerebral and cerebellar  volume loss, likely age-related.   CHEST: 3.5 x 1.7 cm partially cavitary right lower lobe pulmonary mass is markedly FDG avid (SUV max 9.2) and has enlarged slowly since 11/29/2010, when it was a 0.8 x 0.8 cm groundglass opacity.   Interlobular pulmonary septal thickening, subpleural bands, and traction bronchiectasis with a peripheral predominance and associated inflammatory FDG activity. Uniform, moderately FDG avid, nonenlarged, noncalcified middle mediastinal and bilateral   hilar lymph nodes.   1.1 cm partially cavitary opacity in the periphery of the superior segment of the left lower lobe is moderately FDG avid (SUVmax 6.5), not significantly changed anatomically from 12/04/2015, but outside of the imaged volume on the older scans. Calcified   atherosclerosis, including the coronary arteries.   ABDOMEN/PELVIS: No abnormal FDG activity. Negative adrenals. Moderate calcified atherosclerosis.   MUSCULOSKELETAL: Bilateral total hip arthroplasties. Moderate degenerative changes cervical and lumbar spine.   IMPRESSION:   CONCLUSION:   1. 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 Reactive middle mediastinal and bilateral hilar lymphadenopathy.   2. 3.5 cm cavitary right lower lobe pulmonary mass has been enlarging slowly since 2010 and is suspicious for low-grade pulmonary adenocarcinoma. No definite evidence of metastases, but reactive FDG activity in right hilar and mediastinal lymph nodes   from interstitial lung disease could obscure underlying tumor metabolism.   3. 1.1 cm partially cavitary opacity in the superior segment of the left lower lobe is indeterminate. Its appearance and FDG activity are similar the suspected tumor in the contralateral right lung and a synchronous tumor is favored, but it could also   be a focal area of inflammation from the interstitial lung disease.    CT CHEST WO CONTRAST 8/15/2016 7:56 AM  COMPARISON: Chest CT  dated 12/4/2015  LUNGS AND PLEURA: Central airways are patent. Mild bronchiectasis is again noted. No bronchial wall thickening. Basilar predominant reticular opacities with architectural distortion and traction bronchiectasis and bronchiolectasis have not significantly   changed. A subtle background of groundglass has minimally increased. A cavitary mass in the peripheral right lower lobe has increased in size, measuring 4.4 cm x 2.3 cm (previously 4.1 cm x 1.7 cm; series 3 image 90). A 2.9 cm x 1.0 cm opacity in the   peripheral posterior right upper lobe previously measured 1.4 cm x 0.6 cm (image 53). A peripheral opacity in the medial superior segment of the left lower lobe measuring 1.5 cm x 0.8 cm previously measured 1.1 cm x 0.9 cm (image 68).  MEDIASTINUM: Atherosclerotic disease including significant LAD coronary artery disease. Scattered additional coronary calcifications noted. Mediastinal lymphadenopathy has increased. A 1.5 cm right lower paratracheal lymph node previously measured 1.2 cm. A 1.2 cm subcarinal lymph node previously measured 1.0 cm. There is a small amount of pericardial fluid.  LIMITED UPPER ABDOMEN: Negative.  MUSCULOSKELETAL: Osteopenia and multilevel vertebral body degenerative change. There is grade 1 anterolisthesis of C7 on T1.  CONCLUSION:  1. Increasing size of the right lower lobe cavitary mass, favoring low-grade neoplasm. Additional peripheral opacities have increased in size as well, which may represent progression of fibrosis or slow-growing neoplasms.  2. Increased mediastinal lymphadenopathy.  3. Pulmonary fibrosis in a NSIP pattern. Increased groundglass represents an active/cellular component.  4. Atherosclerotic disease living coronary artery disease.  5. Osteopenia and degenerative change.    PET CT scan 11/7/2016  1. Right upper lobe wedge resection has been performed.  2. Right lower lobe pulmonary mass has enlarged and increased in FDG activity. Fiducial markers  have been placed in it.  3. Chronic interstitial lung disease has progressed slightly.  4. 1.1 cm opacity in the periphery of the superior segment of the left lower lobe has decreased in FDG activity and could be a focus of inflammation as a part of the interstitial lung disease, but a tumor at this site is not excluded.    Chest CT scan 11/12/2016  1. No evidence of pulmonary emboli.  2. Compared to the PET/CT done 5 days ago, there has been little change in the appearance of either chest. Specifically, patient's had wedge resection of the right upper lobe pleural-based opacity. Focal pneumatocele or loculated pneumothorax noted adjacent to the staple line with a complex, very small hydropneumothorax again noted in the right chest. Tiny subcutaneous emphysema with air tracking into the chest wall anteriorly at rib 4 is again seen likely site of chest tube.   3. Patient has underlying UIP but has developed focal interstitial and groundglass opacities in the left upper lobe and left lower lobe though unchanged since the PET CT, this is new since the August study and findings suggest an acute component of his interstitial disease. The lack of symmetry suggest this is not simply superimposed pulmonary edema. Suggest pulmonary consultation.  4. Cavitary pleural-based mass in the right lower lobe fiducial markers mild mediastinal adenopathy is unchanged.    CTA CHEST PE RUN 12/11/2016 1:38 AM  INDICATION: Shortness of breath.  COMPARISON: Chest CT 08/15/2016.  ANGIOGRAM CHEST: No pulmonary emboli.  LUNGS AND PLEURA: Interstitial lung disease is present with subpleural reticular nodular densities and scattered groundglass opacities. These findings have progressed since the prior exam. Stable bronchiectasis. 3.7 x 2.9 cm lobulated and partially cavitary mass in the right lower lobe has increased in size. Fiduciary markers are now present. 9 mm nodule in the anterior right lower lobe (image 242) has increased in size,  interval right upper lobe wedge resection with loculated pleural air, 5 mm   lingular nodule increased in size. Tiny right pleural effusion.  MEDIASTINUM: Mediastinal and right hilar lymphadenopathy is slightly decreased.  LIMITED UPPER ABDOMEN: Negative.  MUSCULOSKELETAL: Negative.  IMPRESSION:   1. No pulmonary emboli.  2. Partially cavitated malignant mass in the right lower lobe is mildly increased in size measuring 3.7 x 2.9 cm. Fiducial markers are now present within the mass. Progression of interstitial lung disease. New postsurgical changes right upper lobe with loculated pleural air in this location. There is a new tiny right pleural effusion.  3. 9 mm nodule anterior right lower lobe is increased in size as has a 5 mm nodule in the lingula.  4. Mediastinal lymphadenopathy is decreased    PFTs (12/23/2015)  FEV1/FVC is 85% and is normal.   FEV1 is 3.26L (92%) predicted and is normal.   FVC is 3.83L (92%) predicted and normal.   There was no improvement in spirometry after a single inhaled dose of bronchodilator.   TLC is 5.27L (70%) predicted and is reduced.   RV is 11.41L (47%) predicted and is reduced.   DLCO is 15.62ml/min/hg (63%) predicted and is reduced when it is corrected for hemoglobin.   Flow volume loops indicate no abnormalities.    PFTs (11/4/2016)  FEV1/FVC is 87 and is normal.  FEV1 is 2.60 L (89%) predicted and is normal.  FVC is 2.98 L (75%) predicted and normal.  There was no improvement in spirometry after a single inhaled dose of bronchodilator.  TLC is 4.82 L (66%) predicted and is reduced.  RV is 1.79 L (61%) predicted and is reduced.  DLCO is 54% predicted and is reduced when it is corrected for hemoglobin.

## 2021-06-09 ENCOUNTER — RECORDS - HEALTHEAST (OUTPATIENT)
Dept: CARDIOLOGY | Facility: CLINIC | Age: 86
End: 2021-06-09

## 2021-06-09 DIAGNOSIS — I49.5 SICK SINUS SYNDROME (H): ICD-10-CM

## 2021-06-09 NOTE — PROGRESS NOTES
PULMONARY OUTPATIENT FOLLOW UP NOTE    Assessment:     1. Lung cancer  PET (+) RLL mass, RUL and LLL nodules. (+) mediastinal adenopathy.   S/p EBUS with negative pathology results.   S/p wedge biopsy of RLL mass and RUL nodule, path (+) Squamous cell lung cancer.   Diagnosed with stage IIIA. S/p stereotactic radiosurgery.  2. Enlarging peripheral lingular nodular lesion  Follow up PET CT scan , per oncology notes, consideration for biopsy and fiducial placement, Cyberknife therapy  3. Radiation induced pneumonitis  Continue to taper down systemic steroids over 4 weeks to prednisone 10 mg daily.   If symptoms and radiographic abnormalities recur, then will plan to keep prednisone 20 mg daily and add PCP prophylaxis   4. Pulmonary fibrosis  Lung biopsy was positive for usual interstitial pneumonia. Labs showed (+) TETE, elevated anti Sm antibody  Previous PFTs showed mild to moderate restrictive lung disease and moderate reduction of DLCO.  Continue systemic steroids  5. Carcinoma in situ of bladder   6. Ischemic cardiomyopathy s/p drug eluting stent Synergy proximal LAD on 8/2/2016  7. Paroxysmal atrial fibrillation anticoagulated  8. Hx hypothyroidism     Plan:   1. Prednisone 20 mg daily for one week then down to 10 mg daily continuously  2. If symptoms and radiographic abnormalities recur, then will plan to keep prednisone 20 mg daily and add PCP prophylaxis  3. Will follow PET CT scan , already schedule for next week  4. Continue prilosec daily   5. Follow up in 3 months     Chad Pham  Pulmonary / Critical Care  3/30/2017    CC:      Chief Complaint   Patient presents with     Consult     Hospital Visit Follow Up       HPI:       Kartik Tam is an 81 y.o. male who presents for follow up.  Pt has history of ischemic cardiomyopathy s/p stent proximal LAD 8/2/2016, atrial fibrillation on coumadin, hypothyroidism, carcinomal in situ bladder of bladder, squamous cell lung cancer stage IIIA s/p  radiation therapy, pulmonary fibrosis, biopsy UIP.   Recently admitted to the hospital for worsening shortness of breath, treated for a fib with RVR, and radiation induced pneumonitis.  Discharged on prednisone 40 mg daily and weekly taper, currently on prednisone 20 mg daily.   Reports improvement of physical activity, denies exertional dyspnea. Able to walk 3/4 mille prior to dyspnea, mild dry cough, no wheezes. Denies chest pain, orthopnea, PND. Patient declines further use of ICS, developed oral thrush and dysphonia with ICS.   Adequate appetite. Sleeping Ok.   Denies acid reflux symptoms. No postnasal drip.   Pt reports previous tobacco user, 1-2 ppd for 30 years, quit 35 years ago. Worked in sales.  Family hx significant for two brothers with malignancy, one with throat cancer and other with colon cancer.     Past Medical History:   Diagnosis Date     A-fib 12/05/2014     Bladder cancer      Bronchiectasis RLL      Cataract      Colon polyp      Coronary artery disease      Dementia?      DJD (degenerative joint disease) hips      Hearing impaired + tinnitus      Herpes      Hyperlipidemia      Hypertension      Hypothyroidism     hypo     Lung cancer      Lung nodule     RLL     Micturition syncope      Osteopenia      Pneumothorax      Pulmonary fibrosis      Medications:     Prior to Admission medications    Medication Sig Start Date End Date Taking? Authorizing Provider   dronedarone (MULTAQ) 400 mg tablet Take 1 tablet (400 mg total) by mouth daily. 10/12/15  Yes Agnes Hicks MD   LEVOTHYROXINE SODIUM (LEVOTHYROXINE, BULK, MISC) Take 150 mcg by mouth every morning.    Yes Historical Provider, MD   simvastatin (ZOCOR) 20 MG tablet Take 20 mg by mouth bedtime.   Yes Historical Provider, MD   warfarin (COUMADIN) 5 MG tablet Please take 5 g on 12/6/2014 and 12/7/2014. 12/6/14 12/6/15 Yes Ilana Jarrett MD   doxycycline (VIBRAMYCIN) 100 MG capsule Take 1 capsule (100 mg total) by mouth 2 (two) times  a day. 12/1/15 12/8/15  Chad Pham MD     Social History     Social History     Marital status:      Spouse name: N/A     Number of children: 3     Years of education: N/A     Occupational History     Not on file.     Social History Main Topics     Smoking status: Former Smoker     Packs/day: 3.00     Years: 30.00     Quit date: 12/5/1979     Smokeless tobacco: Never Used     Alcohol use No      Comment: alcoholism in the past, quit drinking 1972      Drug use: No     Sexual activity: Not on file     Other Topics Concern     Not on file     Social History Narrative     Family History   Problem Relation Age of Onset     Diabetes Mother      Heart disease Mother      No Medical Problems Father      Colon cancer Brother      Throat cancer Brother      Review of Systems  A 12 point comprehensive review of systems was negative except as noted.      Objective:     Vitals:    03/30/17 1115   BP: 136/90   Pulse: 80   Resp: 16   SpO2: 96%   Weight: 185 lb (83.9 kg)   Height: 6' (1.829 m)     Gen: awake, alert, no distress  HEENT: pink conjunctiva, moist mucosa, Mallampati II/IV  Neck: no thyromegaly, masses or JVD  Lungs: discrete inspiratory crackles both HT  CV: regular, no murmurs or gallops appreciated  Abdomen: soft, NT, BS wnl  Ext: no edema  Neuro: CN II-XII intact, non focal      Diagnostic tests:     LABS    TETE (+)  RF negative  Smith autoantibodies (+)  Aspergillus fumigatus IgG (+)    BAL cell count Neutrophils 36%, Lymphocytes 8%, Macrophages 50% Eosinophils 6%  BAL cultures negative for fungal/mycobacterial infection.      BRONCHOSCOPY / EBUS - CYTOLOGY 1/22/2016   A) LYMPH NODE, 4R, ENDOBRONCHIAL ULTRASOUND-GUIDED FINE NEEDLE      ASPIRATION FOR CYTOLOGIC EXAM:       - LYMPHOID TISSUE PRESENT, NEGATIVE FOR TUMOR     B) LYMPH NODE, SUBCARINAL, ENDOBRONCHIAL ULTRASOUND-GUIDED FINE      NEEDLE ASPIRATION FOR CYTOLOGIC EXAM:       - LYMPHOID TISSUE PRESENT, NEGATIVE FOR TUMOR     C) LYMPH  NODE, 11R, ENDOBRONCHIAL ULTRASOUND-GUIDED FINE NEEDLE      ASPIRATION FOR CYTOLOGIC EXAM:       - SCANT LYMPHOID TISSUE OBTAINED, NEGATIVE FOR MALIGNANCY     D) LUNG, LOWER RIGHT LOBE, BRONCHIAL ALVEOLAR LAVAGE FOR      CYTOLOGIC EXAM:       1) NEGATIVE FOR MALIGNANT CELLS       2) NUMEROUS ALVEOLAR MACROPHAGES AND OCCASIONAL BRONCHIAL          CELLS ARE PRESENT    CT GUIDED BX - RIGHT LUNG MASS 1/29/16     - RARE ATYPICAL BRONCHIAL CELLS    CT CHEST WO CONTRAST  12/4/2015 11:32 AM   LUNGS AND PLEURA: Patient has mild, peripheral based interstitial fibrosis, bronchiectasis and minimal areas of honeycombing. This has a peripheral and basilar distribution but with sparing the of subpleural lung. In addition, there is a focal 3 x 3 x 2 cm ovoid subpleural area of more extensive cystic bronchiectasis and peribronchiolar thickening in the right lower lobe that has not changed in the short interval (but progressed since 2010). Similary but smaller focal area of bronchiectasis noted   further superiorly in the right upper lobe and medially in the left lower lobe (coronal images 120-109). No air-fluid levels or an obvious mass/soft tissue component. Calcified granulomas noted in the right lower lobe. No change in the 4 x 2 mm ovoid   nodule medial to the focal bronchiectasis (series 3 image 89). There are two nodules in the left upper lobe/lingula, largest measuring 4-5mm and subpleural in location (image60).Tiny groundglass nodule in the left lower lobe is also noted (series 3   image 80).   MEDIASTINUM: Multiple small mediastinal nodes are noted measuring a centimeter in short axis. There are coronary artery calcifications.   LIMITED UPPER ABDOMEN: Unremarkable.   MUSCULOSKELETAL: No suspicious lesions on bone windows.   IMPRESSION:   1. Patient has interstitial lung disease with a distribution suggestive of NSIP. In addition there are more focal areas of bronchiectasis and peribronchiolar thickening as noted above,  largest in the right lower lobe. No air-fluid levels, mucus plugging or obvious soft tissue mass. While this may be inflammatory/infectious in nature, these patients are at higher risk for malignancies as well. Suggest pulmonary consultation for further evaluation.   2. Few pulmonary nodules as noted above.   3. Mildly prominent mediastinal nodes are likely reactive.   4. Coronary artery calcifications.    PET FDG/CT 1/15/2016 8:47 AM   INDICATION: Pulmonary nodule   COMPARISON: CTs from 12/04/2015, 11/30/2015, and 11/29/2010   FINDINGS:   HEAD AND NECK: Marked generalized cerebral and cerebellar volume loss, likely age-related.   CHEST: 3.5 x 1.7 cm partially cavitary right lower lobe pulmonary mass is markedly FDG avid (SUV max 9.2) and has enlarged slowly since 11/29/2010, when it was a 0.8 x 0.8 cm groundglass opacity.   Interlobular pulmonary septal thickening, subpleural bands, and traction bronchiectasis with a peripheral predominance and associated inflammatory FDG activity. Uniform, moderately FDG avid, nonenlarged, noncalcified middle mediastinal and bilateral   hilar lymph nodes.   1.1 cm partially cavitary opacity in the periphery of the superior segment of the left lower lobe is moderately FDG avid (SUVmax 6.5), not significantly changed anatomically from 12/04/2015, but outside of the imaged volume on the older scans. Calcified   atherosclerosis, including the coronary arteries.   ABDOMEN/PELVIS: No abnormal FDG activity. Negative adrenals. Moderate calcified atherosclerosis.   MUSCULOSKELETAL: Bilateral total hip arthroplasties. Moderate degenerative changes cervical and lumbar spine.   IMPRESSION:   CONCLUSION:   1. 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 Reactive middle mediastinal and bilateral hilar lymphadenopathy.   2. 3.5 cm cavitary right lower lobe pulmonary mass has been enlarging slowly since 2010 and is suspicious for  low-grade pulmonary adenocarcinoma. No definite evidence of metastases, but reactive FDG activity in right hilar and mediastinal lymph nodes   from interstitial lung disease could obscure underlying tumor metabolism.   3. 1.1 cm partially cavitary opacity in the superior segment of the left lower lobe is indeterminate. Its appearance and FDG activity are similar the suspected tumor in the contralateral right lung and a synchronous tumor is favored, but it could also   be a focal area of inflammation from the interstitial lung disease.    CT CHEST WO CONTRAST 8/15/2016 7:56 AM  COMPARISON: Chest CT dated 12/4/2015  LUNGS AND PLEURA: Central airways are patent. Mild bronchiectasis is again noted. No bronchial wall thickening. Basilar predominant reticular opacities with architectural distortion and traction bronchiectasis and bronchiolectasis have not significantly   changed. A subtle background of groundglass has minimally increased. A cavitary mass in the peripheral right lower lobe has increased in size, measuring 4.4 cm x 2.3 cm (previously 4.1 cm x 1.7 cm; series 3 image 90). A 2.9 cm x 1.0 cm opacity in the   peripheral posterior right upper lobe previously measured 1.4 cm x 0.6 cm (image 53). A peripheral opacity in the medial superior segment of the left lower lobe measuring 1.5 cm x 0.8 cm previously measured 1.1 cm x 0.9 cm (image 68).  MEDIASTINUM: Atherosclerotic disease including significant LAD coronary artery disease. Scattered additional coronary calcifications noted. Mediastinal lymphadenopathy has increased. A 1.5 cm right lower paratracheal lymph node previously measured 1.2 cm. A 1.2 cm subcarinal lymph node previously measured 1.0 cm. There is a small amount of pericardial fluid.  LIMITED UPPER ABDOMEN: Negative.  MUSCULOSKELETAL: Osteopenia and multilevel vertebral body degenerative change. There is grade 1 anterolisthesis of C7 on T1.  CONCLUSION:  1. Increasing size of the right lower lobe  cavitary mass, favoring low-grade neoplasm. Additional peripheral opacities have increased in size as well, which may represent progression of fibrosis or slow-growing neoplasms.  2. Increased mediastinal lymphadenopathy.  3. Pulmonary fibrosis in a NSIP pattern. Increased groundglass represents an active/cellular component.  4. Atherosclerotic disease living coronary artery disease.  5. Osteopenia and degenerative change.    PET CT scan 11/7/2016  1. Right upper lobe wedge resection has been performed.  2. Right lower lobe pulmonary mass has enlarged and increased in FDG activity. Fiducial markers have been placed in it.  3. Chronic interstitial lung disease has progressed slightly.  4. 1.1 cm opacity in the periphery of the superior segment of the left lower lobe has decreased in FDG activity and could be a focus of inflammation as a part of the interstitial lung disease, but a tumor at this site is not excluded.    Chest CT scan 11/12/2016  1. No evidence of pulmonary emboli.  2. Compared to the PET/CT done 5 days ago, there has been little change in the appearance of either chest. Specifically, patient's had wedge resection of the right upper lobe pleural-based opacity. Focal pneumatocele or loculated pneumothorax noted adjacent to the staple line with a complex, very small hydropneumothorax again noted in the right chest. Tiny subcutaneous emphysema with air tracking into the chest wall anteriorly at rib 4 is again seen likely site of chest tube.   3. Patient has underlying UIP but has developed focal interstitial and groundglass opacities in the left upper lobe and left lower lobe though unchanged since the PET CT, this is new since the August study and findings suggest an acute component of his interstitial disease. The lack of symmetry suggest this is not simply superimposed pulmonary edema. Suggest pulmonary consultation.  4. Cavitary pleural-based mass in the right lower lobe fiducial markers mild  mediastinal adenopathy is unchanged.    CTA CHEST PE RUN 12/11/2016 1:38 AM  INDICATION: Shortness of breath.  COMPARISON: Chest CT 08/15/2016.  ANGIOGRAM CHEST: No pulmonary emboli.  LUNGS AND PLEURA: Interstitial lung disease is present with subpleural reticular nodular densities and scattered groundglass opacities. These findings have progressed since the prior exam. Stable bronchiectasis. 3.7 x 2.9 cm lobulated and partially cavitary mass in the right lower lobe has increased in size. Fiduciary markers are now present. 9 mm nodule in the anterior right lower lobe (image 242) has increased in size, interval right upper lobe wedge resection with loculated pleural air, 5 mm   lingular nodule increased in size. Tiny right pleural effusion.  MEDIASTINUM: Mediastinal and right hilar lymphadenopathy is slightly decreased.  LIMITED UPPER ABDOMEN: Negative.  MUSCULOSKELETAL: Negative.  IMPRESSION:   1. No pulmonary emboli.  2. Partially cavitated malignant mass in the right lower lobe is mildly increased in size measuring 3.7 x 2.9 cm. Fiducial markers are now present within the mass. Progression of interstitial lung disease. New postsurgical changes right upper lobe with loculated pleural air in this location. There is a new tiny right pleural effusion.  3. 9 mm nodule anterior right lower lobe is increased in size as has a 5 mm nodule in the lingula.  4. Mediastinal lymphadenopathy is decreased    CTA CHEST PE RUN 3/12/2017 10:42 AM   INDICATION: Shortness of breath, history of lung cancer  COMPARISON: Chest CT dated 3/6/2017.  ANGIOGRAM CHEST: Negative for pulmonary emboli. Negative for thoracic aortic dissection. There is mild enlargement of the proximal descending thoracic aorta, which is unchanged. There is atherosclerotic disease including coronary artery calcification.   Aortic tortuosity is noted.  LUNGS AND PLEURA: No significant change from the prior study. Extensive groundglass, reticular, and confluent  opacities throughout the lungs. Nodule in the peripheral left upper lobe. Mass along the posterior right hemithorax. No pneumothorax.  MEDIASTINUM: The heart is mildly enlarged. Mediastinal and right hilar lymphadenopathy persists.  LIMITED UPPER ABDOMEN: Normal adrenal glands.  MUSCULOSKELETAL: Osteopenia.  CONCLUSION:  1. No PE.  2. Otherwise no change from the prior study.    PFTs (12/23/2015)  FEV1/FVC is 85% and is normal.   FEV1 is 3.26L (92%) predicted and is normal.   FVC is 3.83L (92%) predicted and normal.   There was no improvement in spirometry after a single inhaled dose of bronchodilator.   TLC is 5.27L (70%) predicted and is reduced.   RV is 11.41L (47%) predicted and is reduced.   DLCO is 15.62ml/min/hg (63%) predicted and is reduced when it is corrected for hemoglobin.   Flow volume loops indicate no abnormalities.    PFTs (11/4/2016)  FEV1/FVC is 87 and is normal.  FEV1 is 2.60 L (89%) predicted and is normal.  FVC is 2.98 L (75%) predicted and normal.  There was no improvement in spirometry after a single inhaled dose of bronchodilator.  TLC is 4.82 L (66%) predicted and is reduced.  RV is 1.79 L (61%) predicted and is reduced.  DLCO is 54% predicted and is reduced when it is corrected for hemoglobin.

## 2021-06-09 NOTE — PROGRESS NOTES
Patient here ambulatory for follow up s/p radiosurgery for lung cancer.  Patient states he has been having some increase in SOB with activities over the past couple weeks making walking with his friends more difficult.  Seen in medical oncology earlier this week.  CT scan done 3/6/17 and here today for results.  Seen by Dr. Patel.  Plan RTC for follow up as directed by physician.

## 2021-06-09 NOTE — PROGRESS NOTES
Spoke with Gunnar.  Clarified medications for him.  He should take Prednisone 20mg daily for one week , then decrease to 10mg daily continuously.  Continue his Prilosec daily.  Disregard Dr. Tarango' instructions regarding Bactrim ( misunderstood Gunnar to say that he was taking this medication).  Gunnar has never taken Bactrim with his Prednisone.

## 2021-06-09 NOTE — PROGRESS NOTES
Internal Medicine Office Visit  Patient Name: Kartik Tam  Patient Age: 81 y.o.  YOB: 1935  MRN: [id number]  ?  Date of Visit: 3/21/2017  Reason for Office Visit:   Chief Complaint   Patient presents with     Hospital Visit Follow Up     dypsnea and acute radiation pneumonitis. questions about inhaler and digpxin interaction. wondering when done with predisone will be stopping the omprazole as well.     INR Check       Assessment / Plan / Medical Decision Makin. Persistent atrial fibrillation  2. Acute radiation pneumonitis  3. Coronary artery disease involving native coronary artery of native heart without angina pectoris  4. Essential hypertension  5. Hypothyroidism, unspecified type  6. HLD (hyperlipidemia)  7. Lung nodule  8. Squamous cell carcinoma of right lung    - Follow-up as planned with pulmonology, oncology, hem/onc, cardiology  - No change to current medications. Continue prednisone taper as prescribed  - Continue omeprazole at current dose while continuing to take prednisone  - INR today  - Follow up as scheduled in 2017       Health Maintenance Review  Health Maintenance   Topic Date Due     FALL RISK ASSESSMENT  2017     ADVANCE DIRECTIVES DISCUSSED WITH PATIENT  2021     TD 18+ HE  2026     PNEUMOCOCCAL POLYSACCHARIDE VACCINE AGE 65 AND OVER  Completed     INFLUENZA VACCINE RULE BASED  Completed     PNEUMOCOCCAL CONJUGATE VACCINE FOR ADULTS (PCV13 OR PREVNAR)  Completed     ZOSTER VACCINE  Completed       I have discontinued Mr. Tam's albuterol and acetaminophen. I am also having him maintain his atorvastatin, levothyroxine, omeprazole, predniSONE, aspirin, digoxin, metoprolol tartrate, and warfarin.     HPI:   Encounter Diagnoses   Name Primary?     Acute radiation pneumonitis Yes     Persistent atrial fibrillation      Coronary artery disease involving native coronary artery of native heart without angina pectoris      Essential hypertension       Hypothyroidism, unspecified type      HLD (hyperlipidemia)      Lung nodule      Squamous cell carcinoma of right lung       Kartik Tam is an 81-year-old male who presents to the office today for follow-up of 2 recent hospitalizations.  He initially presented to the emergency department with dyspnea on 3/12/17.  He was diagnosed with probable acute radiation pneumonitis due to recent CyberKnife treatment.  He was given IV steroids for 2 days then discharged home with a gradual steroid taper.  He has follow-up with pulmonology scheduled.  He states that he has had improvement and resolution of dyspnea.  He denies any wheezing.  He has an occasional dry cough.    He was then hospitalized on 3/17 through 3/20 after presenting to the emergency department with shortness of breath, this time attributed to atrial fibrillation with rapid ventricular rate.  Sotalol was discontinued as this failed to convert his rhythm and blood pressure went down with a slight QTC prolongation noted.  His heart rate was then well controlled with the addition of metoprolol and digoxin.  He denies any heart palpitations and as above, dyspnea has improved.  He continues anticoagulation for atrial fibrillation.    Despite all of his recent hospitalizations and the fact that his wife is also being treated for lung cancer, he states that he remains in a good mood and has a positive outlook.    We reviewed his history of pulmonary fibrosis and multifocal SCC.  He is status post right upper lung wedge resection and radiation.  He has follow-up with pulmonology scheduled for later this week.  Oncology follow up scheduled early April.  He will have a PET scan at that time.    Hypothyroid-euthyroid as of 11/2016    Review of Systems: No dyspnea, lightheadedness, palpitations, dizziness.  No anginal symptoms.  He is quite active and is looking forward to resuming his active lifestyle now that dyspnea has improved     Current Scheduled  Meds:  Outpatient Encounter Prescriptions as of 3/21/2017   Medication Sig Dispense Refill     aspirin 81 MG EC tablet Take 81 mg by mouth daily.       atorvastatin (LIPITOR) 20 MG tablet Take 1 tablet (20 mg total) by mouth bedtime. 90 tablet 3     digoxin (LANOXIN) 125 mcg tablet Take 1 tablet (125 mcg total) by mouth every morning. 30 tablet 0     levothyroxine (SYNTHROID, LEVOTHROID) 150 MCG tablet Take 150 mcg by mouth Daily at 6:00 am.       metoprolol tartrate (LOPRESSOR) 25 MG tablet Take 1 tablet (25 mg total) by mouth 2 (two) times a day. 60 tablet 0     omeprazole (PRILOSEC) 20 MG capsule Take 1 capsule (20 mg total) by mouth Daily before breakfast. 30 capsule 0     predniSONE (DELTASONE) 10 mg tablet pack 60mg daily 3/15-3/22, then 40mg daily 3/23-3/29. Then 20mg daily. 84 tablet 0     warfarin (COUMADIN) 2.5 MG tablet Hold coumadin dose 3/20 and 3/21 then get repeat INR on 3/22 and PCP to resume as appropriate  0     [DISCONTINUED] acetaminophen (TYLENOL) 500 MG tablet Take 1 tablet (500 mg total) by mouth every 4 (four) hours as needed. 30 tablet 0     [DISCONTINUED] albuterol (PROVENTIL HFA;VENTOLIN HFA) 90 mcg/actuation inhaler Inhale 2 puffs every 6 (six) hours as needed for wheezing. 1 Inhaler 6     No facility-administered encounter medications on file as of 3/21/2017.      Past Medical History:   Diagnosis Date     A-fib 12/05/2014     Bladder cancer      Bronchiectasis RLL      Cataract      Colon polyp      Coronary artery disease      Dementia?      DJD (degenerative joint disease) hips      Hearing impaired + tinnitus      Herpes      Hyperlipidemia      Hypertension      Hypothyroidism     hypo     Lung cancer      Lung nodule     RLL     Micturition syncope      Osteopenia      Pneumothorax      Pulmonary fibrosis      Past Surgical History:   Procedure Laterality Date     APPENDECTOMY       BLADDER SURGERY  2010     CARDIAC CATHETERIZATION  08/02/2016     CARDIAC ELECTROPHYSIOLOGY STUDY  AND ABLATION       COLONOSCOPY       CORONARY ANGIOPLASTY WITH STENT PLACEMENT       JOINT REPLACEMENT Bilateral     Hips     LUNG CANCER SURGERY  01/2017    Cyber Knife     MA CYSTOURETHROSCOPY,FULGUR <0.5 CM LESN N/A 1/25/2016    Procedure: CYSTOSCOPY BLADDER BIOPSIES TRANSURETHRAL RESECTION BLADDER TUMOR;  Surgeon: Antoine Rodriguez MD;  Location: [facility];  Service: Urology     THORACOSCOPY Right 9/26/2016    Procedure:   RIGHT THORACOSCOPY/ RIGHT UPPER LOBE AND RIGHT LOWER LOBE WITH NEEDLE WIRE LOCALIZATION MULTIPLE WEDGE RESECTION NODE SAMPLING FIDUCIALS PLACEMENT;  Surgeon: Brandon Pavon MD;  Location: [facility];  Service:      THORACOSCOPY Right 9/26/2016    Procedure: THORACOSCOPY FOR CONTROL POST OPERATIVE BLEED;  Surgeon: Brandon Pavon MD;  Location: [facility];  Service:      Social History   Substance Use Topics     Smoking status: Former Smoker     Packs/day: 3.00     Years: 30.00     Quit date: 12/5/1979     Smokeless tobacco: Never Used     Alcohol use No      Comment: alcoholism in the past, quit drinking 1972        Objective / Physical Examination:  Vitals:    03/21/17 1109   BP: 110/64   Patient Site: Right Arm   Patient Position: Sitting   Cuff Size: Adult Regular   Pulse: 70   Weight: 188 lb 6.4 oz (85.5 kg)     Wt Readings from Last 3 Encounters:   03/21/17 188 lb 6.4 oz (85.5 kg)   03/19/17 183 lb 6.4 oz (83.2 kg)   03/14/17 173 lb 8 oz (78.7 kg)     Body mass index is 25.55 kg/(m^2).     CTA Chest PE Run (Order [id number])   Imaging   Date: 3/12/2017 Department: [facility] P3 Released By/Authorizing: John Carrasquillo DO (auto-released)   Study Result   CTA CHEST PE RUN  3/12/2017 10:42 AM     INDICATION: Shortness of breath, history of lung cancer  TECHNIQUE: Helical acquisition through the chest was performed during the arterial phase of contrast enhancement using IV contrast. 2D and 3D reconstructions were performed by the CT technologist. Dose  reduction techniques were used.   IV CONTRAST: Iohexol (Omni) 75mL  COMPARISON: Chest CT dated 3/6/2017.     FINDINGS:  ANGIOGRAM CHEST: Negative for pulmonary emboli. Negative for thoracic aortic dissection. There is mild enlargement of the proximal descending thoracic aorta, which is unchanged. There is atherosclerotic disease including coronary artery calcification.   Aortic tortuosity is noted.     LUNGS AND PLEURA: No significant change from the prior study. Extensive groundglass, reticular, and confluent opacities throughout the lungs. Nodule in the peripheral left upper lobe. Mass along the posterior right hemithorax. No pneumothorax.     MEDIASTINUM: The heart is mildly enlarged. Mediastinal and right hilar lymphadenopathy persists.     LIMITED UPPER ABDOMEN: Normal adrenal glands.     MUSCULOSKELETAL: Osteopenia.     IMPRESSION:   CONCLUSION:  1. No PE.  2. Otherwise no change from the prior study.         XR Chest PA and Lateral (Order [id number])   Imaging   Date: 3/17/2017 Department: [facility] P3 Released By/Authorizing: Mason Mcallister PA-C (auto-released)   Study Result   XR CHEST PA AND LATERAL  3/17/2017 9:23 AM     INDICATION: SOB  COMPARISON: The CT from 3/12/2017.     FINDINGS:   Allowing for differences in technique no significant interval change.     Extensive fibrotic changes involving the lungs including the entire right lung and left base. Previous fiducials markers in the right lungs. No discrete consolidative pulmonary infiltrate, pleural effusion or pneumothorax.         General Appearance: Alert and oriented, cooperative, affect appropriate, speech clear, in no apparent distress  Lungs: Clear to auscultation bilaterally. Normal inspiratory and expiratory effort  Cardiovascular: Irregular rate, normal S1, S2.  Extremities: Pulses 2+ and equal throughout. No edema    No orders of the defined types were placed in this encounter.  Followup: Return in about 3 months (around  6/21/2017) for Next scheduled follow up. earlier if needed.    Michelle Tello, CNP  [facility]

## 2021-06-09 NOTE — PROGRESS NOTES
[facility] [place] Hematology and Oncology Progress Note    Patient: Kartik Tam  MRN: [id number]  Date of Service: 03/07/2017      Assessment and Plan:    Squamous cell carcinoma of right lung    Staging form: Lung, AJCC 7th Edition      Clinical: No stage assigned - Unsigned      Pathologic stage from 11/7/2016: Stage IIIA (T4, N0, cM0) - Signed by John Andre MD on 11/7/2016    1.  Squamous cell carcinoma of the right lung:  Multifocal.  He completed CyberKnife about 2 months ago.  CT scan images were reviewed.  The areas of treatment look better.  The right lower lobe nodules almost resolved.  However he has a enlarging lesion in the peripheral left lung.  We decided to repeat his CT in 3 months and if this area gets bigger have it biopsied with fiducials placed and proceed with CyberKnife.    2.  Increasing dyspnea and fatigue: When I look at his CT it looks like he has worsening infiltrates.  I'll have Dr. Tarango look at it.  The patient is only on prednisone 5 mg.  1.  This could be some radiation pneumonitis.  May need to increase his prednisone.  He is also not on any inhalers.  We'll likely get him albuterol but again must discuss with pulmonary first.    ECOG Performance   ECOG Performance Status: 1    Distress Assessment  Distress Assessment Score: No distress    Pain  Currently in Pain: No/denies    Diagnosis:    1.  Squamous cell carcinoma of the right lung: Multifocal.  Diagnosed in September 2016.  Right upper lobe and right lower lobe.  Procedure done and September 2016.    Treatment:    1.  Right upper lobe wedge resection.  Right lower lobe biopsy.  2.  CyberKnife to the right upper lobe area and also the right lower lobe.  Completed January 9, 2017.    Interim History:    Gunnar is here today for a four-month follow-up visit.  Overall he is feeling okay.  He is noted that over the past few weeks he's had more shortness of breath especially with exertion and also more fatigue.  His stamina is  "decreased.  He used to walk a mile in the mall but now has 2 respiratory frequently due to fatigue and dyspnea.  No chest pain.  No lightheadedness or dizziness.    Review of Systems:    Constitutional  Constitutional (WDL): Exceptions to WDL  Fatigue: Fatigue relieved by rest  Neurosensory  Neurosensory (WDL): Exceptions to WDL  Peripheral Sensory Neuropathy: Asymptomatic, loss of deep tendon reflexes or paresthesia (chest area where surgery was and where CK happened)  Cardiovascular  Cardiovascular (WDL): All cardiovascular elements are within defined limits  Pulmonary  Respiratory (WDL): Exceptions to WDL  Cough: Mild symptoms, nonprescription intervention indicated (phlegm \"caught in throat\")  Dyspnea: Shortness of breath with moderate exertion (increased in the last couple weeks)  Gastrointestinal  Gastrointestinal (WDL): All gastrointestinal elements are within defined limits  Genitourinary  Genitourinary (WDL): All genitourinary elements are within defined limits  Integumentary  Integumentary (WDL): All integumentary elements are within defined limits  Patient Coping  Patient Coping: Accepting  Accompanied by  Accompanied by: Family Member (wife Dafne)    Past History:    Past Medical History:   Diagnosis Date     A-fib 12/5/2014    Has had hx of in the past     Bronchiectasis RLL      Cancer     Hx. of Bladder Cancer     Cataract      Colon polyp      Coronary artery disease      Dementia?      DJD (degenerative joint disease) hips      Hearing impaired + tinnitus      Herpes      Hyperlipidemia      Hypertension      Hypothyroidism     hypo     Lung cancer      Lung nodule     RLL     Micturition syncope      Osteopenia      Pneumothorax      Pulmonary fibrosis      Physical Exam:    Recent Vitals 3/7/2017   Weight 185 lbs 5 oz   /76   Pulse 68   Temp 97.7   Temp src 1   SpO2 99     General: patient appears stated age of 81 y.o.. Nontoxic and in no distress.   HEENT: Head: atraumatic, normocephalic. " Sclerae anicteric.  Chest:  Normal respiratory effort  Cardiac:  No edema.   Abdomen: abdomen is non-distended  Extremities: normal tone and muscle bulk.  Skin: no lesions or rash. Warm and dry.   CNS: alert and oriented x3. Grossly non-focal.   Psychiatric: normal mood and affect.     Lab Results:    Recent Results (from the past 168 hour(s))   INR   Result Value Ref Range    INR 3.10 (H) 0.90 - 1.10   POCT creatinine   Result Value Ref Range    POC Creatinine 0.9 mg/dL   POCT GFR   Result Value Ref Range    POC GFR AMER AF HE >60  >60 mL/min/1.73m2    POC GFR NON AMER AF >60  >60 mL/min/1.73m2     Imaging:    CT scan images were personally reviewed.  Increasing out of the left peripheral lung.    Ct Chest With Contrast    Result Date: 3/6/2017  CT CHEST W CONTRAST 3/6/2017 8:25 AM INDICATION: Neoplasm - lymphoma TECHNIQUE: Routine chest. Dose reduction techniques were used. IV CONTRAST: [id number]/90ml COMPARISON: 11/12/2016, 12/11/2016. FINDINGS: LUNGS AND PLEURA: There are postoperative changes from right thoracotomy. The loculated hydropneumothorax seen on the prior studies has resolved. There is pleural-based thickening posteriorly in the right lung and images 53 260 with 2 fiducial markers present. The mass lesion seen on 12/11/2016 at this site appears slightly smaller in anterior posterior dimension now measuring proximally 2.2 cm versus 2.5 cm. There is peripheral interlobular septal thickening an there are patchy groundglass opacities throughout the right lung and more peripherally in the left lung. This may represent post treatment changes on the right versus progression of inflammatory infiltrates. There is a small pleural-based nodule in the inferior lingula 6 mm. There is bronchiectasis most pronounced in the right lower lobe. No pneumothorax. Mild pleural thickening in the right posterior lung no definite pleural effusion. On image 78 of series 3 measuring 1.2 cm in diameter. On the prior study of  12/11/2016 this measured MEDIASTINUM: Stable right paratracheal and aorticopulmonary window lymphadenopathy. No pericardial effusion. There is coronary artery calcification. LIMITED UPPER ABDOMEN: Visualized liver and adrenal glands are normal. No lymphadenopathy in the upper abdomen. MUSCULOSKELETAL: Negative.     CONCLUSION: 1.  Interval resolution of loculated hydropneumothorax in the right posterior lung. 2.  Slight decrease in size of pleural-based right lung mass with associated fiducial markers 3.  interstitial fibrotic changes with scattered patchy groundglass opacities most pronounced in the right lung this may represent posttreatment change on the right. 4.  Increased size of nodule in the inferior lingula on the left fifth clinically indicated PET/CT may be helpful for further evaluation of this nodule.      Signed by: John Andre MD

## 2021-06-09 NOTE — PROGRESS NOTES
Gunnar was hospitalized x 2 in March for SOB, CP.  He just completed a prednisone taper and is now taking his maint dose.  He reports decreased stamina the past 2 months and increased SOB with minimal exertion.  He has concerns about quality of life.  He denies pain.  He reports dry cough.  No recent fever.

## 2021-06-10 NOTE — PROGRESS NOTES
[facility] Follow-up Note    Assessment & Plan        1. Coronary artery disease involving native coronary artery of native heart without angina pectoris -angiography August 2 showed normal left main, left anterior descending with a mid 80% stenosis which received a synergy drug-coated stent, normal circumflex and normal right coronary artery.  Stress test performed during hospitalization last week showed no scar, no ischemia, and preserved ejection fraction greater than 70%.     2. Persistent atrial fibrillation -asymptomatic, not valvular and possibly permanent. He did not tolerate Multaq or amiodarone due to lung issues as well as sotalol causing QT prolongation. Since it is essentially asymptomatic we will let him have the atrial fibrillation with anticoagulation and rate control with metoprolol and digoxin, digoxin blood level was normal. INR subtherapeutic at 1.4.  And defer to primary clinic adjusting .   3. Essential hypertension with goal blood pressure less than 140/90 -under good control.     4. Acute on chronic diastolic congestive heart failure -given mildly elevated BNP presumed heart failure.  He did not fill his diuretic on discharge from the hospital.  He will try 40 mg of Lasix a day or possibly twice a day and if no better after 1-2 doses will discontinue this.     5. Squamous cell lung cancer- recent biopsy did show bilateral involvement so he now has possibly stage III B and is planning on having CyberKnife.     6. Dyspnea on exertion -doubt cardiac with normal stress test and normal ejection fraction although atrial fibrillation mild BNP could contribute.  Strongly suspect due to lung issues i.e. pulmonary fibrosis, radiation pneumonitis, and underlying lung disease.     7. Pulmonary fibrosis -as above.       Plan  1.  Try Lasix 40 mg a day or twice a day if no better discontinue.  Prescription will be sent.  2.  Renew digoxin and metoprolol.  3.  Given these issues follow-up  "with me in 6 months or sooner if needed.  4.  Needs closer INR follow-up.    Subjective  CC: 82-year-old white gentleman being seen in post hospital discharge follow-up today.  He is still short of breath and is progressively been getting worse over the last several months.  There is no cough, sputum production, PND, orthopnea, peripheral edema, syncope, dizziness, chest discomfort or palpitations.    Medications  Current Outpatient Prescriptions   Medication Sig     aspirin 81 MG EC tablet Take 81 mg by mouth daily.     atorvastatin (LIPITOR) 20 MG tablet Take 1 tablet (20 mg total) by mouth bedtime.     digoxin (LANOXIN) 125 mcg tablet Take 1 tablet (125 mcg total) by mouth every morning.     furosemide (LASIX) 40 MG tablet Take 1 tablet (40 mg total) by mouth 2 (two) times a day at 9am and 6pm.     levothyroxine (SYNTHROID, LEVOTHROID) 150 MCG tablet TAKE 1 TABLET (150 MCG TOTAL) BY MOUTH DAILY AT 6:00 AM.     metoprolol tartrate (LOPRESSOR) 25 MG tablet Take 1 tablet (25 mg total) by mouth 2 (two) times a day.     omeprazole (PRILOSEC) 20 MG capsule Take 1 capsule (20 mg total) by mouth Daily before breakfast.     predniSONE (DELTASONE) 10 MG tablet Take 1 tablet (10 mg total) by mouth daily.     warfarin (COUMADIN) 2.5 MG tablet Hold coumadin dose 3/20 and 3/21 then get repeat INR on 3/22 and PCP to resume as appropriate (Patient taking differently: Take 2.5 mg by mouth daily. Was on hold since 4/14. Previously on 2.5 mg daily. Set to restart 4/25/17 with INR check 4/31)       Objective  /82 (Patient Site: Left Arm, Patient Position: Sitting, Cuff Size: Adult Regular)  Pulse 74  Resp 18  Ht 5' 11\" (1.803 m)  Wt 186 lb (84.4 kg)  BMI 25.94 kg/m2    General Appearance:    Alert, cooperative, no distress, appears stated age   Head:    Normocephalic, without obvious abnormality, atraumatic   Throat:   Lips, mucosa, and tongue normal; teeth and gums normal   Neck:   Supple, symmetrical, trachea midline, " no adenopathy;        thyroid:  No enlargement/tenderness/nodules; no carotid    bruit or JVD   Back:     Symmetric, no curvature, ROM normal, no CVA tenderness   Lungs:     Clear to auscultation bilaterally, respirations unlabored   Chest wall:    No tenderness or deformity   Heart:   irregularly irregular, S1 and S2 normal, no murmur, rub   or gallop   Abdomen:     Soft, non-tender, bowel sounds active all four quadrants,     no masses, no organomegaly   Extremities:   Normal, atraumatic, no cyanosis or edema   Pulses:   2+ and symmetric all extremities   Skin:   Skin color, texture, turgor normal, no rashes or lesions     Results    Lab Results personally reviewed   Lab Results   Component Value Date    CHOL 155 11/16/2016    CHOL 220 (H) 10/13/2010     Lab Results   Component Value Date    HDL 62 11/16/2016    HDL 49 10/13/2010     Lab Results   Component Value Date    LDLCALC 78 11/16/2016    LDLCALC 134 (H) 10/13/2010     Lab Results   Component Value Date    TRIG 74 11/16/2016    TRIG 187 (H) 10/13/2010     Lab Results   Component Value Date    WBC 6.8 04/26/2017    HGB 14.4 04/26/2017    HCT 45.5 04/26/2017     04/26/2017     Lab Results   Component Value Date    CREATININE 1.18 04/26/2017    BUN 18 04/26/2017     04/26/2017    K 3.6 04/26/2017    CO2 22 04/26/2017     Review of Systems:   General: WNL  Eyes: WNL  Ears/Nose/Throat: WNL  Lungs: WNL  Heart: WNL  Stomach: WNL  Bladder: WNL  Muscle/Joints: WNL  Skin: WNL  Nervous System: WNL  Mental Health: WNL     Blood: WNL

## 2021-06-10 NOTE — PROCEDURES
Interventional Radiology Post-Procedure Note   [facility]  ?   Brief Procedure Note:   Patient name: Kartik Tam  Pt MRN:[id number]   Date of procedure: 4/19/2017     Procedure(s): Attempted left upper lobe mass biopsy/fiducial placement  Sedation method: Moderate sedation was employed. The patient was monitored by an interventional radiology nurse at all times throughout the procedure under my direct guidance.  Pre Procedure Diagnosis: Squamous cell carcinoma of the lung, IPF, LESLIE basilar hypermetabolic nodule  Post Procedure Diagnosis: same  Indications: Need for fiducial placement and tissue diagnosis   ?   Attending: Lucho Alejandra M.D.  Specimen(s) removed: None   Additional studies ordered: None  Drains: None   Estimated Blood Loss: Minimal  Complications: Minimal pneumothorax  Vascular closure method: N/A    Findings/Notes/Comments: Unable to successfully perform biopsy/fiducial placement. Patient with severe fibrosis of the lungs preventing advancement of the 19g guiding needle. There are no 17/18g needles available here ([facility] [employer]) which may be able to penetrate the dense fibrotic tissue. Should tissue diagnosis/fiducial marker placement still be needed,  17/18g needles are available at [facility] as well as [facility].   ?   Please see dictation in PACS or under the Imaging tab in [employer] for detailed procedure note.     Lucho Alejandra M.D.   Vascular and Interventional Radiology   Pager: [phone]   After Hours / Scheduling: [phone]     4/19/2017  10:25 AM

## 2021-06-10 NOTE — PROGRESS NOTES
RADIATION ONCOLOGY WEEKLY TREATMENT VISIT NOTE      Assessment / Impression       1. Squamous cell carcinoma of right lung          Tolerating radiation therapy well.  All questions and concerns addressed.      Plan:     Continue radiation treatment as prescribed.  Radiation: Site: Lt lung  Stereotactic Radiosurgery: Yes  Stereotactic Radiosurgery date: 05/19/17  Concurrent Therapy: No  Today's Dose: 1000  Total Dose for Thoracic: 5000  Today's Fraction/Total Fraction Thoracic: 1/5      Subjective:      HPI: Kartik Tam is a 82 y.o. male with    1. Squamous cell carcinoma of right lung         The following portions of the patient's history were reviewed and updated as appropriate: allergies, current medications, past family history, past medical history, past social history, past surgical history and problem list.      Objective:     Exam:     Vitals:    05/19/17 1004   BP: 108/70   Pulse: 85   TempSrc: Oral   SpO2: 94%   Weight: 184 lb 9.6 oz (83.7 kg)       Wt Readings from Last 8 Encounters:   05/19/17 184 lb 9.6 oz (83.7 kg)   05/05/17 186 lb (84.4 kg)   05/01/17 185 lb 9.6 oz (84.2 kg)   04/26/17 179 lb 12.8 oz (81.6 kg)   04/24/17 180 lb (81.6 kg)   04/19/17 177 lb 9.6 oz (80.6 kg)   04/06/17 181 lb 14.4 oz (82.5 kg)   04/03/17 181 lb (82.1 kg)       General: Alert and oriented, in no acute distress  Kartik has no Erythema.    Treatment Summary to Date    Aria chart and setup information reviewed    Joan Patel MD

## 2021-06-10 NOTE — PROGRESS NOTES
Gunnar arrived via w/c accompanied by his spouse for CK tx.  VSS.  O2 sats are lower than usual.  Pt and spouse also report Gunnar has been more SOB and coughing more frequently.  Cough is typically dry, occasionally productive of clear mucous.  Pt reports having 1 small clot in the mucous the other day.  He is on coumadin for A fib.  He is afebrile.  Wt is stable.  He is taking his baseline prednisone of 10 mg daily.  Relayed the above information to Dr. Bailey.  He met with pt and spouse today.  He spoke with Dr. Beltre in pulmonology and Dr. Hicks in cardiology and it was decided pt is to try taking Lasix 40 mg daily, increase to 40 mg BID in 2 days if he does not note improvement in SOB.  Rx was e-prescribed today.  Writer updated Dr. Day on the above should pt call the clinic after hours with concerns.

## 2021-06-10 NOTE — PROGRESS NOTES
VS obtained per MERLINE Robins, prior to tx today as writer was with another pt.  Met with pt and spouse following tx.  Gunnar stated he is doing fine.  He reports no new concerns.  Asked Gunnar if he is taking decadron after each tx.  His spouse, Dafne, stated Dr. Patel told them at the last office visit that he only needs to take his daily prednisone at this time and he does not need to take decadron.  Writer noted this in [facility], progress note.  Confirmed with pt and spouse that he is to take prednisone only at this time but should keep the decadron should Dr. Patel advise he take it for increased cough or SOB.  They verbalized understanding and will notify RN or MD if he develops side effects that are not tolerable.

## 2021-06-10 NOTE — PROGRESS NOTES
Assessment/Plan:     1. Heart failure with diastolic dysfunction, NYHA class III: Kartik Tam appears not compensated.  He continues to have moderate to severe dyspnea on exertion.  He states his weights have been stable.  He has trace edema in his left lower extremity.  BMP and BNP pending.  I have increased his Lasix to 40 mg twice a day.  I have recommended that he call us by the end of the week and give me an update his status.  We discussed monitoring her failure symptoms, following a low-sodium diet, monitoring daily weights, and heart failure treatment.  He met with a heart failure nurse clinician to discuss heart failure management.      2. Persistent atrial fibrillation: Rate controlled.  He continues to take metoprolol tartrate 25 mg twice a day and digoxin 125 mcg daily.  He is on warfarin for anticoagulation.  His INRs are monitored by his primary doctor.    Follow-up in the heart failure clinic in 6 weeks and with Dr. Hicks in November    Subjective:     Kartik Tam is seen at [facility] heart failure clinic today for continued follow-up.  He follows up for diastolic dysfunction.  His most recent echocardiogram was done December 11, 2016 which showed an ejection fraction of 58%.  He had a nuclear stress test on April 26, 2017 which was normal.  He has a past medical history significant for hypertension, hyperlipidemia, persistent atrial fibrillation, and lung cancer.    Today, he comes in with complaints of increased dyspnea on exertion.  He was at his oncologist on May 26, 2017 who noticed more shortness of breath and was in contact with his primary cardiologist Dr. Hicks.  Dr. Hicks recommended he start Lasix 40 mg daily over the weekend.  Today, he states his shortness of breath has improved slightly but is still pretty winded with any activity.  He denies any orthopnea or PND.  He also has fatigue which has not worsened.  He denies any other acute heart failure symptoms.  He  denies lightheadedness, shortness of breath, orthopnea, PND, palpitations, chest pain, abdominal fullness/bloating and lower extremity edema.      He is monitoring home weights which are stable around 185 pounds.  He is following a low sodium diet.  He states he has been using Nu salt.       Review of Systems:   General: WNL  Eyes: WNL  Ears/Nose/Throat: WNL  Lungs: Cough, Shortness of Breath, Wheezing  Heart: Shortness of Breath with activity, Irregular Heartbeat  Stomach: WNL  Bladder: WNL  Muscle/Joints: WNL  Skin: WNL  Nervous System: WNL  Mental Health: WNL     Blood: WNL     Patient Active Problem List   Diagnosis     Persistent atrial fibrillation     Essential hypertension     Bronchiectasis without complication     CIS (carcinoma in situ of bladder), dx 2010.      Mass of upper lobe of left lung     Interstitial lung disease     HLD (hyperlipidemia)     Squamous cell carcinoma of right lung     Coronary artery disease involving native coronary artery of native heart without angina pectoris     Pleural effusion     Hypothyroidism, unspecified type     Pulmonary fibrosis     Chronic anticoagulation, [id number] score 3     Epididymal cyst     Acute radiation pneumonitis     Diastolic congestive heart failure     Dyspnea on exertion     Adjustment disorder with depressed mood       Past Medical History:   Diagnosis Date     A-fib 12/05/2014     Bladder cancer      Bronchiectasis RLL      Cataract      Colon polyp      Coronary artery disease      Dementia?      DJD (degenerative joint disease) hips      Hearing impaired + tinnitus      High cholesterol      Hyperlipidemia      Hypertension     pt. denies     Hypothyroidism     hypo     Lung cancer      Lung nodule     RLL     Micturition syncope      Osteopenia      Pneumonitis     acute radiation     Pneumothorax      Pneumothorax, postoperative 11/10/2016     Pulmonary fibrosis      Shingles      Shortness of breath        Past Surgical History:   Procedure  Laterality Date     APPENDECTOMY       Attempted lung  biopsy  04/19/2017     BLADDER SURGERY  2010     CARDIAC CATHETERIZATION  08/02/2016    stent x 1     CARDIAC ELECTROPHYSIOLOGY STUDY AND ABLATION       COLONOSCOPY       CORONARY STENT PLACEMENT       JOINT REPLACEMENT Bilateral     Hips     LUNG CANCER SURGERY  12/2016 and 01/2017    Cyber Knife-9 treatments     TX CYSTOURETHROSCOPY,FULGUR <0.5 CM LESN N/A 1/25/2016    Procedure: CYSTOSCOPY BLADDER BIOPSIES TRANSURETHRAL RESECTION BLADDER TUMOR;  Surgeon: Antoine Rodriguez MD;  Location: [facility];  Service: Urology     THORACOSCOPY Right 9/26/2016    Procedure:   RIGHT THORACOSCOPY/ RIGHT UPPER LOBE AND RIGHT LOWER LOBE WITH NEEDLE WIRE LOCALIZATION MULTIPLE WEDGE RESECTION NODE SAMPLING FIDUCIALS PLACEMENT;  Surgeon: Brandon Pavon MD;  Location: [facility];  Service:      THORACOSCOPY Right 9/26/2016    Procedure: THORACOSCOPY FOR CONTROL POST OPERATIVE BLEED;  Surgeon: Brandon Pavon MD;  Location: [facility];  Service:        Family History   Problem Relation Age of Onset     Diabetes Mother      Heart disease Mother      No Medical Problems Father      Colon cancer Brother      Throat cancer Brother        Social History     Social History     Marital status:      Spouse name: N/A     Number of children: 3     Years of education: N/A     Occupational History     Not on file.     Social History Main Topics     Smoking status: Former Smoker     Packs/day: 3.00     Years: 30.00     Quit date: 12/5/1979     Smokeless tobacco: Never Used     Alcohol use No      Comment: alcoholism in the past, quit drinking 1972      Drug use: No     Sexual activity: Not on file     Other Topics Concern     Not on file     Social History Narrative       Current Outpatient Prescriptions   Medication Sig Dispense Refill     aspirin 81 MG EC tablet Take 81 mg by mouth daily.       atorvastatin (LIPITOR) 20 MG tablet Take 1 tablet (20 mg total)  by mouth bedtime. 90 tablet 3     digoxin (LANOXIN) 125 mcg tablet Take 1 tablet (125 mcg total) by mouth every morning. 90 tablet 3     levothyroxine (SYNTHROID, LEVOTHROID) 150 MCG tablet TAKE 1 TABLET (150 MCG TOTAL) BY MOUTH DAILY AT 6:00 AM. 90 tablet 2     metoprolol tartrate (LOPRESSOR) 25 MG tablet Take 1 tablet (25 mg total) by mouth 2 (two) times a day. 180 tablet 3     omeprazole (PRILOSEC) 20 MG capsule Take 1 capsule (20 mg total) by mouth Daily before breakfast. 90 capsule 3     predniSONE (DELTASONE) 10 mg tablet Take 1 tablet (10 mg total) by mouth daily. 90 tablet 6     warfarin (COUMADIN) 2.5 MG tablet Hold coumadin dose 3/20 and 3/21 then get repeat INR on 3/22 and PCP to resume as appropriate (Patient taking differently: Take 2.5 mg by mouth daily. Was on hold since 4/14. Previously on 2.5 mg daily. Set to restart 4/25/17 with INR check 4/31)  0     furosemide (LASIX) 40 MG tablet Take 1 tablet (40 mg total) by mouth 2 (two) times a day at 9am and 6pm. 180 tablet 3     No current facility-administered medications for this visit.        Allergies   Allergen Reactions     Penicillins      Patient had reaction to medication 65 years ago, may or may not have been the penicillin. Had huge rash.       Objective:     Vitals:    05/30/17 1330   BP: 102/68   Pulse: 80   Resp: 24     Wt Readings from Last 3 Encounters:   05/30/17 179 lb (81.2 kg)   05/26/17 185 lb 4.8 oz (84.1 kg)   05/19/17 184 lb 9.6 oz (83.7 kg)       General Appearance:   Alert, cooperative and in no acute distress.   HEENT:  No scleral icterus; the mucous membranes were pink and moist.   Neck: JVP normal. No HJR.   Chest: The spine was straight. The chest was symmetric.   Lungs:   Respirations unlabored; the lungs are clear to auscultation.   Cardiovascular:    irregularly irregular. S1 and S2 without murmur, clicks or rubs. Radial and posterior tibial pulses are intact and symmetrical.    Abdomen:  Soft, nontender, nondistended,  bowel sounds present   Extremities: No cyanosis, clubbing, trace left lower extremity edema.   Skin: No xanthelasma.   Neurologic: Mood and affect are appropriate.         Lab Review   BMP and BNP pending          Cardiographics  Echocardiogram: 12/11/2016  Summary     When compared to the previous study dated 12/5/2014, there are changes noted. left ventricular systolic function appears normal on the current study..    Left ventricle ejection fraction is normal. The calculated left ventricular ejection fraction is 58%.    Thoracic Aorta: The aortic root is mildly dilated.     NM pharmacological stress test 4/26/2017  Conclusion     The pharmacologic nuclear stress test is negative for inducible myocardial ischemia or infarction.    When compared to the images of 7/20/2016, there has been no significant change.               40 minutes were spent face to face with the patient with greater than 50% spent on education and counseling.      Vero Larson, [facility]   Heart Failure Clinic

## 2021-06-10 NOTE — PROGRESS NOTES
DC instructions given verbally and in writing, pt and spouse verbalized their understanding. Dr. Patel would like pt to stay on Prednisone 10mg daily as Dr. Tarango had prescribed and forgo the decadron at this time. Pt understood instructions. He's more SOB recently, mild non-productive cough. Did not take any sedative or pain med for tx today.

## 2021-06-10 NOTE — PROGRESS NOTES
RN was not available at the time of pt's tx.  VS obtained per MERLINE Yeager.  Pt did not express any new concerns today.

## 2021-06-10 NOTE — PROGRESS NOTES
Gunnar arrived via w/c accompanied by his spouse, Dafne, for his final CK tx.  Dafne stated he has had a bad morning with SOB and dizziness.  He had a cardiology appt yesterday and his Lasix dose was increased to BID.  Checked VS and Gunnar's BP is running lower than usual.  Asked Gunnar if he thinks he's drinking enough water with the increased dose of Lasix.  He stated he probably is not.  He said he typically has a glass of ice water with his levothyroxine before breakfast and he didn't do that today.  His spouse stated she has to remind him to drink water.  Gave Gunnar 240 cc water to drink in the clinic prior to tx today.  Dafne also reports Gunnar gets SOB with minimal activity and needs to sit and rest frequently.  Called Dr. Patel re the above.  Gunnar already has a decadron Rx at home.  Dr. Patel would like him to take decadron 4 mg daily with breakfast for 10 days in addition to the prednisone and see if this helps his SOB.  Writer included new decadron information on D/C instructions and provided new copy for pt/spouse.  Gunnar tolerated tx well.  He was given his celebration cake.  VS obtained.  BP improved sitting but dropped with standing.  He denied dizziness after tx.  Discussed the above with Dr. Bailey and he met with pt and spouse.  He advised pt to continue to weigh himself daily and call the heart clinic if wt continues to drop.  He enc increased fluid intake and suggested Gatorade and Ben Bolt Instant Breakfast.  Writer provided pt with Ensure samples and coupons.  Gunnar drank 1 supplement while in clinic.  Writer enc pt to check his BP at home regularly and record readings.  He is to call the heart clinic in 1 week with an update.  Enc him to call sooner for continued dizziness and wt loss as Lasix may need to be adjusted.  Potassium may need to be checked.  They verbalized understanding and left the clinic via w/c accompanied hy HE transport team member and spouse.

## 2021-06-10 NOTE — PROGRESS NOTES
SBRT/SRS OTV Note      Assessment / Impression       1. Squamous cell carcinoma of right lung          Tolerating radiation therapy well.  All questions and concerns addressed.    Plan:     Continue radiation treatment as prescribed.  Radiation: Site: Lt lung  Stereotactic Radiosurgery: Yes  Stereotactic Radiosurgery date: 05/19/17  Concurrent Therapy: No  Today's Dose: 1000  Total Dose for Thoracic: 5000  Today's Fraction/Total Fraction Thoracic: 1/5    Subjective:      HPI: Kartik Tam is a 82 y.o. male with    1. Squamous cell carcinoma of right lung         The following portions of the patient's history were reviewed and updated as appropriate: allergies, current medications, past family history, past medical history, past social history, past surgical history and problem list.      Objective:     Exam:   Vitals:    05/19/17 1004   BP: 108/70   Pulse: 85   TempSrc: Oral   SpO2: 94%   Weight: 184 lb 9.6 oz (83.7 kg)       Wt Readings from Last 8 Encounters:   05/19/17 184 lb 9.6 oz (83.7 kg)   05/05/17 186 lb (84.4 kg)   05/01/17 185 lb 9.6 oz (84.2 kg)   04/26/17 179 lb 12.8 oz (81.6 kg)   04/24/17 180 lb (81.6 kg)   04/19/17 177 lb 9.6 oz (80.6 kg)   04/06/17 181 lb 14.4 oz (82.5 kg)   04/03/17 181 lb (82.1 kg)       General: Alert and oriented, in no acute distress  Kartik has no Erythema.    Treatment Summary to Date    Aria chart and setup information reviewed    Joan Patel MD

## 2021-06-10 NOTE — PROGRESS NOTES
This 82 year old man has previously undergone two courses of Cyberknife stereotactic radiotherapy for squamous cell carcinomas  In the upper and lower regions of his right lung. A week ago he began Cyberknife treatments to a cancer at his left lung base. Since  then he has become more short of breath while walking, and has increased frequency of a cough which has been productive of a  clear sputum except for one episode of hemoptysis. He has not noted chest pain or wheezing. He has no history of varicose veins  or deep vein thrombosis.    On examination there are normal breath sounds over both upper lung fields   Rales are heard prominently over both posterior lung  bases. Heart rythym is of artrial fibrillation. There is mild pretibial edema on the left side and none on the right side.    Today he received #4 out of 5 of the scheduled treatments to his left lung. He will return on 5/31/17 for the final session.    I believe that his current respiratory symptoms are more likely due to congestive heart failure than they are to radiation peumonitis.    The case was discussed by phone with the Pulmonologist, Dr. Beltre, and Cardiologist, Dr Hicks.      A prescription has been sent for the patient to receive #15 furosemide 40 milligram. Directions are to take one daily, then to  Increase dose to one twice daily if shortness of breath has not been significantly relieved after two days, and then report to one of  the doctors named above or an Emergency  Department  if his condition worsens.

## 2021-06-10 NOTE — PROGRESS NOTES
Call from Dr. Leo Trevino that patient was seen there and was voicing that he was more sob and that sats were decreased.  Attempted to call patient to see how he was doing and if he needed anything from us.  Was not able to reach.  Left VM message to please call if he needed anything from us.  Also stated that if he was not feeling well over the weekend that he would need to be seen in the ED or Urgent Care.

## 2021-06-10 NOTE — PROGRESS NOTES
LATE ENTRY FOR 5/25/17: Met with patient and his wife today in clinic, as his wife was getting her chemo.  He talked about almost being toward the end of his Cyberknife treatments, and he will be getting #4 of 5 tomorrow.  I offered him emotional support and encouragement.  He is aware I am leaving my position here at [facility], and I gave him Monae Galicia's contact for in the interim until a new person is hired into my role.  He verbalized understanding.  At this time, he denies the need for any additional resources/needs.  Patricia Junior RN

## 2021-06-10 NOTE — PROGRESS NOTES
Patient seen in clinic for HF education s/p recent hospital discharge 4/26/17.   Reviewed HF Binder that includes the  HF Sx Awareness & Action plan  handout and  A Stronger Pump  booklet and Weight log booklet highlighting :  __X_patient s type of heart failure _X__Na management in diet  __X_importance of daily wts  _X__Fluid Guidelines, if applicable  __X_medication review and importance of compliance     Instructed patient on signs and sx of heart failure, reiterated when to call clinic - reviewed HF hotline # [phone] and after hours call # [phone].  Majority of time was spent reviewing: low sodium diet, weight log, and label reading. Patient's wife does the cooking as well as the shopping, patient and wife will start label reading. Patient will also start recording daily weights in his new weight log. Encouraged patient to avoid processed foods. Advised patient to share HF education with his wife.  Patient said he had questions about his diagnoses, specifically about comments that Dr. Hicks has told him in the past- advised patient to call Dr. Hicks about this.   Patient verbalized understanding of HF discussion.  Plan for f/u with continued HF education reviewed.  Formal f/u scheduled with nurse clinician for continued education - will continue to reinforce HF management education.

## 2021-06-10 NOTE — BRIEF OP NOTE
[facility] Radiology Brief Op Note    CT BIOPSY LUNG  Procedure Note    Name:  Kartik Tam  PCP:  BABS Feliciano  Procedure Date: 4/24/2017       Post-Procedure Diagnosis:  1. Mass of upper lobe of left lung         Findings:    Fiducial placement and bx    Additional Staff:      Estimated Blood Loss:   none    Specimens:    3 cores    Complications:    None      Antoine Jade     Date: 4/24/2017  Time: 9:04 AM

## 2021-06-11 NOTE — PROGRESS NOTES
SBRT/SRS Treatment Summary    Patient: Kartik Tam   MRN: [id number]  : 1935  Care Provider: Joan Patel    Date of Service: 2017      HISTORY: Kartik Tam was treated with stereotactic radiosurgery    SITE TREATED: Left lung cancer  TOTAL DOSE: 5000 cGy  NUMBER OF FRACTION: 5  DATES COMPLETED: May 19 - May 31, 2017    Kartik tolerated the treatment without unexpected sides effects.      PLAN: Discharge instructions were given and Kartik knows to call if questions/issues arise. Kartik will be seen in f/u in 3 months with a scan.       Signed by: Joan Patel MD

## 2021-06-11 NOTE — PROGRESS NOTES
"Internal Medicine Office Visit  Patient Name: Kartik Tam  Patient Age: 82 y.o.  YOB: 1935  MRN: [id number]  ?  Date of Visit: 2017  Reason for Office Visit:   Chief Complaint   Patient presents with     Follow-up     6 months, INR as well       Assessment / Plan / Medical Decision Makin. Hyperlipidemia  2. Persistent atrial fibrillation  3. Advanced care planning/counseling discussion  4. CIS (carcinoma in situ of bladder), dx .   5. Chronic diastolic congestive heart failure  6. Hypothyroidism, unspecified type  7. Pulmonary fibrosis  -Pt was provided with the advanced directives \"[place] Honoring Choices\" form which he will review his family and bring in an updated copy.  Additionally, I provided forms for POLST which he will also review and which we discussed needs to be signed by a provider.   - SOA likely multifactorial. Currently stable.  He monitors his weight regularly, regulates sodium intake.  He remains physically active walking with friends.  - Follow up with pulmonology and oncology as planned. Urology appointment is also coming up for follow up of carcinoma in situ of bladder  - Follow up in 6 months in office, sooner if needed       Health Maintenance Review  Health Maintenance   Topic Date Due     FALL RISK ASSESSMENT  2017     ADVANCE DIRECTIVES DISCUSSED WITH PATIENT  2021     TD 18+ HE  2026     PNEUMOCOCCAL POLYSACCHARIDE VACCINE AGE 65 AND OVER  Completed     INFLUENZA VACCINE RULE BASED  Completed     PNEUMOCOCCAL CONJUGATE VACCINE FOR ADULTS (PCV13 OR PREVNAR)  Completed     ZOSTER VACCINE  Completed         I have discontinued Mr. Tam's dexamethasone. I have also changed his atorvastatin. Additionally, I am having him maintain his aspirin, warfarin, omeprazole, levothyroxine, digoxin, predniSONE, furosemide, and metoprolol tartrate.     HPI:   Encounter Diagnoses   Name Primary?     Advanced care planning/counseling discussion Yes     " Hyperlipidemia      Persistent atrial fibrillation      CIS (carcinoma in situ of bladder), dx 2010.       Chronic diastolic congestive heart failure      Hypothyroidism, unspecified type      Pulmonary fibrosis      Chronic anticoagulation, [id number] score 3      Dyspnea on exertion      HTN (hypertension)       Kartik Tam an 82-year-old male who presents the office today for follow-up.    We first reviewed his recurrent dyspnea particularly with exertion.  He has been seen by both pulmonology and cardiology.  Shortness of breath seems to be multifactorial and he has been maintained on furosemide. He is diligent with minimal sodium intake and weight monitoring at home. He has been able to maintain a regular walking routine with friends but has to stop and rest frequently. He now has a handicap parking sticker as he cannot go further than 200 feet without stopping to rest due to shortness of breath. He has a CHF clinic follow up visit in July.     Carcinoma in situ bladder- no hematuria. Follow up with urologist is coming up soon.     Interstitial lung disease, squamous cell carcinoma of right lung- followed by pulmonology and oncology. He has a CT scan in August with a planned follow up after imaging.     Hypothyroid- Euthyroid as of 12/2016    Atrial fibrillation- maintains warfarin anticoagulation. Last INR 2.8 6/2017. Needs an INR drawn today.     HTN- stable     He asks today about advance care planning. He is realistic about the potential outcome of the lung cancer treatment and progression. He is considering becoming a DNR and requests paperwork for this today. Different options for setting up his choices were reviewed.     Review of Systems:  SOA with exertion, stable for patient.  No chest pain, weight change         Current Scheduled Meds:  Outpatient Encounter Prescriptions as of 6/26/2017   Medication Sig Dispense Refill     aspirin 81 MG EC tablet Take 81 mg by mouth daily.       atorvastatin  (LIPITOR) 20 MG tablet Take 1 tablet (20 mg total) by mouth at bedtime. 90 tablet 3     digoxin (LANOXIN) 125 mcg tablet Take 1 tablet (125 mcg total) by mouth every morning. 90 tablet 3     furosemide (LASIX) 40 MG tablet Take 1 tablet (40 mg total) by mouth 2 (two) times a day at 9am and 6pm. 180 tablet 3     levothyroxine (SYNTHROID, LEVOTHROID) 150 MCG tablet TAKE 1 TABLET (150 MCG TOTAL) BY MOUTH DAILY AT 6:00 AM. 90 tablet 2     metoprolol tartrate (LOPRESSOR) 25 MG tablet Take 0.5 tablets (12.5 mg total) by mouth 2 (two) times a day. 90 tablet 0     omeprazole (PRILOSEC) 20 MG capsule Take 1 capsule (20 mg total) by mouth Daily before breakfast. 90 capsule 3     predniSONE (DELTASONE) 10 mg tablet Take 1 tablet (10 mg total) by mouth daily. 90 tablet 6     warfarin (COUMADIN) 2.5 MG tablet Hold coumadin dose 3/20 and 3/21 then get repeat INR on 3/22 and PCP to resume as appropriate (Patient taking differently: Take 2.5 mg by mouth daily. Was on hold since 4/14. Previously on 2.5 mg daily. Set to restart 4/25/17 with INR check 4/31)  0     [DISCONTINUED] atorvastatin (LIPITOR) 20 MG tablet Take 1 tablet (20 mg total) by mouth bedtime. 90 tablet 3     [DISCONTINUED] dexamethasone (DECADRON) 4 MG tablet Take 4 mg by mouth daily with breakfast. Beginning 5/31/17, take decadron 4 mg daily for 10 days, then stop.       No facility-administered encounter medications on file as of 6/26/2017.      Past Medical History:   Diagnosis Date     A-fib 12/05/2014     Bladder cancer      Bronchiectasis RLL      Cataract      Colon polyp      Coronary artery disease      Dementia?      DJD (degenerative joint disease) hips      Hearing impaired + tinnitus      High cholesterol      Hyperlipidemia      Hypertension     pt. denies     Hypothyroidism     hypo     Lung cancer      Lung nodule     RLL     Micturition syncope      Osteopenia      Pneumonitis     acute radiation     Pneumothorax      Pneumothorax, postoperative  11/10/2016     Pulmonary fibrosis      Shingles      Shortness of breath      Past Surgical History:   Procedure Laterality Date     APPENDECTOMY       Attempted lung  biopsy  04/19/2017     BLADDER SURGERY  2010     CARDIAC CATHETERIZATION  08/02/2016    stent x 1     CARDIAC ELECTROPHYSIOLOGY STUDY AND ABLATION       COLONOSCOPY       CORONARY STENT PLACEMENT       JOINT REPLACEMENT Bilateral     Hips     LUNG CANCER SURGERY  12/2016 and 01/2017    Cyber Knife-9 treatments     MS CYSTOURETHROSCOPY,FULGUR <0.5 CM LESN N/A 1/25/2016    Procedure: CYSTOSCOPY BLADDER BIOPSIES TRANSURETHRAL RESECTION BLADDER TUMOR;  Surgeon: Antoine Rodriguez MD;  Location: [facility] [place];  Service: Urology     THORACOSCOPY Right 9/26/2016    Procedure:   RIGHT THORACOSCOPY/ RIGHT UPPER LOBE AND RIGHT LOWER LOBE WITH NEEDLE WIRE LOCALIZATION MULTIPLE WEDGE RESECTION NODE SAMPLING FIDUCIALS PLACEMENT;  Surgeon: Brandon Pavon MD;  Location: [facility];  Service:      THORACOSCOPY Right 9/26/2016    Procedure: THORACOSCOPY FOR CONTROL POST OPERATIVE BLEED;  Surgeon: Brandon Pavon MD;  Location: [facility];  Service:      Social History   Substance Use Topics     Smoking status: Former Smoker     Packs/day: 3.00     Years: 30.00     Quit date: 12/5/1979     Smokeless tobacco: Never Used     Alcohol use No      Comment: alcoholism in the past, quit drinking 1972        Objective / Physical Examination:  Vitals:    06/26/17 0703   BP: 120/64   Pulse: 62   Weight: 178 lb (80.7 kg)     Wt Readings from Last 3 Encounters:   06/26/17 178 lb (80.7 kg)   05/30/17 179 lb (81.2 kg)   05/26/17 185 lb 4.8 oz (84.1 kg)     Body mass index is 24.83 kg/(m^2).     General Appearance: Alert and oriented, cooperative, affect appropriate, speech clear, in no apparent distress  Head: Normocephalic, atraumatic  Lungs: Bibasilar crackles. Normal inspiratory and expiratory effort  Cardiovascular: Irregular rate, normal S1,  S2  Extremities: No edema    No orders of the defined types were placed in this encounter.  Followup: Return in about 6 months (around 12/26/2017) for Next scheduled follow up. earlier if needed.    Total time spent with patient was 30 minutes with >50% of time spent in face-to-face counseling regarding the above plan       Michelle Tello CNP  [facility]

## 2021-06-11 NOTE — PROGRESS NOTES
Assessment/Plan:     1. Heart failure with preserved ejection fraction, NYHA class III: Kartik Tam appears well compensated.  He continues to have dyspnea on exertion.  No signs of fluid retention today.  He continues to monitor weights which have been stable.  His wife states that they follow a low-sodium diet and she reads food labels.  BMP pending.  He will continue taking Lasix 40 mg twice a day.  He watched the heart failure video today.  Heart failure treatment includes:    2. Hypotension: He states he has had some blood pressures below 90 systolically.  His dizziness has improved.  He reduced his Metroprolol tartrate on his own to 12.5 mg daily.  I have changed this to metoprolol succinate 12.5 mg daily and recommended taking it at night.  I encouraged him to continue monitoring blood pressures and call if symptoms of dizziness or lightheadedness continue with low blood pressures.  He denies any falls.    Follow-up in the heart failure clinic in 2 months and with Dr. Hicks in November    Subjective:     Kartik Tam is seen at [facility] heart failure clinic today for continued follow-up.  His wife accompanies him today.  He follows up for heart failure with preserved ejection fraction.  His most recent echocardiogram was done December 11, 2016 which showed an ejection fraction of 58%.  He had a nuclear stress test on April 26, 2017 which was normal.  He has a past medical history significant for hypertension, hyperlipidemia, persistent atrial fibrillation, and lung cancer.    During the last clinic visit, I increased his Lasix to 40 mg twice a day.  He states he continues to have dyspnea on exertion.  He states his dizziness has improved.  He denies any other acute heart failure symptoms.  His weights have remained stable.  He denies fatigue, shortness of breath, orthopnea, PND, palpitations, chest pain, abdominal fullness/bloating and lower extremity edema.      He is monitoring home  weights which are stable between 170-174 pounds.  He is following a low sodium diet.  He participates in regular physical activity including walking 5 days a week.      Review of Systems:   General: WNL  Eyes: Visual Distubance  Ears/Nose/Throat: WNL  Lungs: Shortness of Breath  Heart: Shortness of Breath with activity, Irregular Heartbeat  Stomach: WNL  Bladder: WNL  Muscle/Joints: WNL  Skin: WNL  Nervous System: Daytime Sleepiness, Dizziness, Loss of Balance  Mental Health: WNL     Blood: WNL     Patient Active Problem List   Diagnosis     Persistent atrial fibrillation     HTN (hypertension)     Bronchiectasis without complication     CIS (carcinoma in situ of bladder), dx 2010.      Mass of upper lobe of left lung     Interstitial lung disease     HLD (hyperlipidemia)     Squamous cell carcinoma of right lung     Coronary artery disease involving native coronary artery of native heart without angina pectoris     Pleural effusion     Hypothyroidism, unspecified type     Pulmonary fibrosis     Chronic anticoagulation, [id number] score 3     Epididymal cyst     Acute radiation pneumonitis     Diastolic congestive heart failure     Dyspnea on exertion     Adjustment disorder with depressed mood       Past Medical History:   Diagnosis Date     A-fib 12/05/2014     Bladder cancer      Bronchiectasis RLL      Cataract      Colon polyp      Coronary artery disease      Dementia?      DJD (degenerative joint disease) hips      Hearing impaired + tinnitus      High cholesterol      Hyperlipidemia      Hypertension     pt. denies     Hypothyroidism     hypo     Lung cancer      Lung nodule     RLL     Micturition syncope      Osteopenia      Pneumonitis     acute radiation     Pneumothorax      Pneumothorax, postoperative 11/10/2016     Pulmonary fibrosis      Shingles      Shortness of breath        Past Surgical History:   Procedure Laterality Date     APPENDECTOMY       Attempted lung  biopsy  04/19/2017     BLADDER  SURGERY  2010     CARDIAC CATHETERIZATION  08/02/2016    stent x 1     CARDIAC ELECTROPHYSIOLOGY STUDY AND ABLATION       COLONOSCOPY       CORONARY STENT PLACEMENT       JOINT REPLACEMENT Bilateral     Hips     LUNG CANCER SURGERY  12/2016 and 01/2017    Cyber Knife-9 treatments     CA CYSTOURETHROSCOPY,FULGUR <0.5 CM LESN N/A 1/25/2016    Procedure: CYSTOSCOPY BLADDER BIOPSIES TRANSURETHRAL RESECTION BLADDER TUMOR;  Surgeon: Antoine Rodriguez MD;  Location: [facility] [place];  Service: Urology     THORACOSCOPY Right 9/26/2016    Procedure:   RIGHT THORACOSCOPY/ RIGHT UPPER LOBE AND RIGHT LOWER LOBE WITH NEEDLE WIRE LOCALIZATION MULTIPLE WEDGE RESECTION NODE SAMPLING FIDUCIALS PLACEMENT;  Surgeon: Brandon Pavon MD;  Location: [facility];  Service:      THORACOSCOPY Right 9/26/2016    Procedure: THORACOSCOPY FOR CONTROL POST OPERATIVE BLEED;  Surgeon: Brandon Pavon MD;  Location: [facility];  Service:        Family History   Problem Relation Age of Onset     Diabetes Mother      Heart disease Mother      No Medical Problems Father      Colon cancer Brother      Throat cancer Brother        Social History     Social History     Marital status:      Spouse name: N/A     Number of children: 3     Years of education: N/A     Occupational History     Not on file.     Social History Main Topics     Smoking status: Former Smoker     Packs/day: 3.00     Years: 30.00     Quit date: 12/5/1979     Smokeless tobacco: Never Used     Alcohol use No      Comment: alcoholism in the past, quit drinking 1972      Drug use: No     Sexual activity: Not on file     Other Topics Concern     Not on file     Social History Narrative       Current Outpatient Prescriptions   Medication Sig Dispense Refill     aspirin 81 MG EC tablet Take 81 mg by mouth daily.       atorvastatin (LIPITOR) 20 MG tablet Take 1 tablet (20 mg total) by mouth at bedtime. 90 tablet 3     digoxin (LANOXIN) 125 mcg tablet Take 1 tablet  (125 mcg total) by mouth every morning. 90 tablet 3     furosemide (LASIX) 40 MG tablet Take 1 tablet (40 mg total) by mouth 2 (two) times a day at 9am and 6pm. 180 tablet 3     levothyroxine (SYNTHROID, LEVOTHROID) 150 MCG tablet TAKE 1 TABLET (150 MCG TOTAL) BY MOUTH DAILY AT 6:00 AM. 90 tablet 2     omeprazole (PRILOSEC) 20 MG capsule Take 1 capsule (20 mg total) by mouth daily. 90 capsule 3     predniSONE (DELTASONE) 10 mg tablet Take 1 tablet (10 mg total) by mouth daily. 90 tablet 6     warfarin (COUMADIN) 2.5 MG tablet Hold coumadin dose 3/20 and 3/21 then get repeat INR on 3/22 and PCP to resume as appropriate (Patient taking differently: Take 2.5 mg by mouth daily. Was on hold since 4/14. Previously on 2.5 mg daily. Set to restart 4/25/17 with INR check 4/31)  0     metoprolol succinate (TOPROL-XL) 25 MG Take 0.5 tablets (12.5 mg total) by mouth daily. 45 tablet 3     No current facility-administered medications for this visit.        Allergies   Allergen Reactions     Penicillins      Patient had reaction to medication 65 years ago, may or may not have been the penicillin. Had huge rash.       Objective:     Vitals:    07/11/17 0827   BP: 102/68   Pulse: 64   Resp: 20     Wt Readings from Last 3 Encounters:   07/11/17 179 lb (81.2 kg)   06/30/17 178 lb (80.7 kg)   06/26/17 178 lb (80.7 kg)       General Appearance:   Alert, cooperative and in no acute distress.   HEENT:  No scleral icterus; the mucous membranes were pink and moist.   Neck: JVP normal. No HJR.   Chest: The spine was straight. The chest was symmetric.   Lungs:   Respirations unlabored; the lungs are clear to auscultation.   Cardiovascular:    irregularly irregular. S1 and S2 without murmur, clicks or rubs. Radial and posterior tibial pulses are intact and symmetrical.    Abdomen:  Soft, nontender, nondistended, bowel sounds present   Extremities: No cyanosis, clubbing, or edema.   Skin: No xanthelasma.   Neurologic: Mood and affect are  appropriate.         Lab Review   BMP pending          Cardiographics  Echocardiogram: 12/11/2016  Summary     When compared to the previous study dated 12/5/2014, there are changes noted. left ventricular systolic function appears normal on the current study..    Left ventricle ejection fraction is normal. The calculated left ventricular ejection fraction is 58%.    Thoracic Aorta: The aortic root is mildly dilated.     NM pharmacological stress test 4/26/2017  Conclusion     The pharmacologic nuclear stress test is negative for inducible myocardial ischemia or infarction.    When compared to the images of 7/20/2016, there has been no significant change.                25 minutes were spent face to face with the patient with greater than 50% spent on education and counseling.      Vero Larson, [facility]   Heart Failure Clinic

## 2021-06-11 NOTE — PROGRESS NOTES
PULMONARY OUTPATIENT FOLLOW UP NOTE    Assessment:     1. Dyspnea  Multifactorial, pulmonary fibrosis, lung cancer, ischemic cardiomyopathy.  Significant desaturation with activities, no need for O2 supplementation.   2. Lung cancer  PET (+) RLL mass, RUL and LLL nodules. (+) mediastinal adenopathy.   S/p EBUS with negative pathology results.   S/p wedge biopsy of RLL mass and RUL nodule, path (+) Squamous cell lung cancer. Diagnosed with stage IIIA. S/p stereotactic radiosurgery RUL and RLL December 2016.   3. Enlarging peripheral lingular nodular lesion  S/p CT guided biopsy (+) 4/2017 Squamous cell carcinoma  S/p Cyberknife therapy May 2017  4. Radiation induced pneumonitis  Stable. Continue prednisone 10 mg daily  5. Pulmonary fibrosis  Lung biopsy was positive for usual interstitial pneumonia. Labs showed (+) TETE, elevated anti Sm antibody  Previous PFTs showed mild to moderate restrictive lung disease and moderate reduction of DLCO.  Continue systemic steroids  6. Carcinoma in situ of bladder   7. Ischemic cardiomyopathy s/p drug eluting stent Synergy proximal LAD on 8/2/2016  8. Paroxysmal atrial fibrillation anticoagulated  9. Hx hypothyroidism     Plan:   1. Daily weight, current weight 178 lbs  2. Continue lasix  3. Continue prednisone 10 mg daily  4. Continue prilosec daily   5. Follow up chest CT scan September 2017  6. Follow up in 3 months     Chad Pham  Pulmonary / Critical Care  6/30/2017    CC:      Chief Complaint   Patient presents with     Follow Up     Shortness of Breath     worse in the am and and having low blood pressure causing dizzines     Hemoptysis       HPI:       Kartik Tam is an 82 y.o. male who presents for follow up.  Pt has history of ischemic cardiomyopathy s/p stent proximal LAD 8/2/2016, atrial fibrillation on coumadin, hypothyroidism, carcinomal in situ bladder of bladder, squamous cell lung cancer stage IIIA s/p radiation therapy RUL and RLL lesion,  enlarging LLL lesion (+) Squamous cell carcinoma, pulmonary fibrosis, biopsy UIP.   Patient finished cyberknife radiation therapy of enlarging left lower lobe lesion (+) Sq cell carcinoma on 5/2017.   Reports mild dry cough, exertional dyspnea, able to walk 3/4 mille prior to dyspnea, mild dry cough, no wheezes.   Denies chest pain, orthopnea, PND or swelling of LEs.   Had a recent negative stress test on 4/26/2017. Patient was evaluated by cardiology and low dose lasix was given for a few days. His BNP was elevated.   Denies acid reflux symptoms. No postnasal drip.   Pt reports previous tobacco user, 1-2 ppd for 30 years, quit 35 years ago. Worked in sales.  Family hx significant for two brothers with malignancy, one with throat cancer and other with colon cancer.     Past Medical History:   Diagnosis Date     A-fib 12/05/2014     Bladder cancer      Bronchiectasis RLL      Cataract      Colon polyp      Coronary artery disease      Dementia?      DJD (degenerative joint disease) hips      Hearing impaired + tinnitus      High cholesterol      Hyperlipidemia      Hypertension     pt. denies     Hypothyroidism     hypo     Lung cancer      Lung nodule     RLL     Micturition syncope      Osteopenia      Pneumonitis     acute radiation     Pneumothorax      Pneumothorax, postoperative 11/10/2016     Pulmonary fibrosis      Shingles      Shortness of breath      Medications:     Prior to Admission medications    Medication Sig Start Date End Date Taking? Authorizing Provider   dronedarone (MULTAQ) 400 mg tablet Take 1 tablet (400 mg total) by mouth daily. 10/12/15  Yes Agnes Hicks MD   LEVOTHYROXINE SODIUM (LEVOTHYROXINE, BULK, MISC) Take 150 mcg by mouth every morning.    Yes Historical Provider, MD   simvastatin (ZOCOR) 20 MG tablet Take 20 mg by mouth bedtime.   Yes Historical Provider, MD   warfarin (COUMADIN) 5 MG tablet Please take 5 g on 12/6/2014 and 12/7/2014. 12/6/14 12/6/15 Yes Ilana Jarrett MD    doxycycline (VIBRAMYCIN) 100 MG capsule Take 1 capsule (100 mg total) by mouth 2 (two) times a day. 12/1/15 12/8/15  Chad Pham MD     Social History     Social History     Marital status:      Spouse name: N/A     Number of children: 3     Years of education: N/A     Occupational History     Not on file.     Social History Main Topics     Smoking status: Former Smoker     Packs/day: 3.00     Years: 30.00     Quit date: 12/5/1979     Smokeless tobacco: Never Used     Alcohol use No      Comment: alcoholism in the past, quit drinking 1972      Drug use: No     Sexual activity: Not on file     Other Topics Concern     Not on file     Social History Narrative     Family History   Problem Relation Age of Onset     Diabetes Mother      Heart disease Mother      No Medical Problems Father      Colon cancer Brother      Throat cancer Brother      Review of Systems  A 12 point comprehensive review of systems was negative except as noted.      Objective:     Vitals:    06/30/17 0811 06/30/17 0814   BP: 106/64 100/64   Patient Site: Left Arm Left Arm   Patient Position: Sitting Standing   Cuff Size: Adult Regular Adult Regular   Pulse: 75 86   Resp: 18    SpO2: 96% 96%   Weight: 178 lb (80.7 kg)    SpO2 at rest on RAis 96%.   SpO2 after ambulating 300ft on RA is 89-90%.    Gen: awake, alert, no distress  HEENT: pink conjunctiva, moist mucosa, Mallampati II/IV  Neck: no thyromegaly, masses or JVD  Lungs:  inspiratory crackles both bases  CV: regular, no murmurs or gallops appreciated  Abdomen: soft, NT, BS wnl  Ext: no edema  Neuro: CN II-XII intact, non focal      Diagnostic tests:     LABS    TETE (+)  RF negative  Smith autoantibodies (+)  Aspergillus fumigatus IgG (+)    BAL cell count Neutrophils 36%, Lymphocytes 8%, Macrophages 50% Eosinophils 6%  BAL cultures negative for fungal/mycobacterial infection.      BRONCHOSCOPY / EBUS - CYTOLOGY 1/22/2016   A) LYMPH NODE, 4R, ENDOBRONCHIAL  ULTRASOUND-GUIDED FINE NEEDLE      ASPIRATION FOR CYTOLOGIC EXAM:       - LYMPHOID TISSUE PRESENT, NEGATIVE FOR TUMOR     B) LYMPH NODE, SUBCARINAL, ENDOBRONCHIAL ULTRASOUND-GUIDED FINE      NEEDLE ASPIRATION FOR CYTOLOGIC EXAM:       - LYMPHOID TISSUE PRESENT, NEGATIVE FOR TUMOR     C) LYMPH NODE, 11R, ENDOBRONCHIAL ULTRASOUND-GUIDED FINE NEEDLE      ASPIRATION FOR CYTOLOGIC EXAM:       - SCANT LYMPHOID TISSUE OBTAINED, NEGATIVE FOR MALIGNANCY     D) LUNG, LOWER RIGHT LOBE, BRONCHIAL ALVEOLAR LAVAGE FOR      CYTOLOGIC EXAM:       1) NEGATIVE FOR MALIGNANT CELLS       2) NUMEROUS ALVEOLAR MACROPHAGES AND OCCASIONAL BRONCHIAL          CELLS ARE PRESENT    CT GUIDED BX - RIGHT LUNG MASS 1/29/16     - RARE ATYPICAL BRONCHIAL CELLS    CT GUIDED BIOPSY of LUNG MASS 4/24/2017  Final Diagnosis   LEFT LUNG MASS, NEEDLE CORE BIOPSY:     -  SQUAMOUS CELL CARCINOMA          CT CHEST WO CONTRAST  12/4/2015 11:32 AM   LUNGS AND PLEURA: Patient has mild, peripheral based interstitial fibrosis, bronchiectasis and minimal areas of honeycombing. This has a peripheral and basilar distribution but with sparing the of subpleural lung. In addition, there is a focal 3 x 3 x 2 cm ovoid subpleural area of more extensive cystic bronchiectasis and peribronchiolar thickening in the right lower lobe that has not changed in the short interval (but progressed since 2010). Similary but smaller focal area of bronchiectasis noted   further superiorly in the right upper lobe and medially in the left lower lobe (coronal images 120-109). No air-fluid levels or an obvious mass/soft tissue component. Calcified granulomas noted in the right lower lobe. No change in the 4 x 2 mm ovoid   nodule medial to the focal bronchiectasis (series 3 image 89). There are two nodules in the left upper lobe/lingula, largest measuring 4-5mm and subpleural in location (image60).Tiny groundglass nodule in the left lower lobe is also noted (series 3   image 80).    MEDIASTINUM: Multiple small mediastinal nodes are noted measuring a centimeter in short axis. There are coronary artery calcifications.   LIMITED UPPER ABDOMEN: Unremarkable.   MUSCULOSKELETAL: No suspicious lesions on bone windows.   IMPRESSION:   1. Patient has interstitial lung disease with a distribution suggestive of NSIP. In addition there are more focal areas of bronchiectasis and peribronchiolar thickening as noted above, largest in the right lower lobe. No air-fluid levels, mucus plugging or obvious soft tissue mass. While this may be inflammatory/infectious in nature, these patients are at higher risk for malignancies as well. Suggest pulmonary consultation for further evaluation.   2. Few pulmonary nodules as noted above.   3. Mildly prominent mediastinal nodes are likely reactive.   4. Coronary artery calcifications.    PET FDG/CT 1/15/2016 8:47 AM   INDICATION: Pulmonary nodule   COMPARISON: CTs from 12/04/2015, 11/30/2015, and 11/29/2010   FINDINGS:   HEAD AND NECK: Marked generalized cerebral and cerebellar volume loss, likely age-related.   CHEST: 3.5 x 1.7 cm partially cavitary right lower lobe pulmonary mass is markedly FDG avid (SUV max 9.2) and has enlarged slowly since 11/29/2010, when it was a 0.8 x 0.8 cm groundglass opacity.   Interlobular pulmonary septal thickening, subpleural bands, and traction bronchiectasis with a peripheral predominance and associated inflammatory FDG activity. Uniform, moderately FDG avid, nonenlarged, noncalcified middle mediastinal and bilateral   hilar lymph nodes.   1.1 cm partially cavitary opacity in the periphery of the superior segment of the left lower lobe is moderately FDG avid (SUVmax 6.5), not significantly changed anatomically from 12/04/2015, but outside of the imaged volume on the older scans. Calcified   atherosclerosis, including the coronary arteries.   ABDOMEN/PELVIS: No abnormal FDG activity. Negative adrenals. Moderate calcified  atherosclerosis.   MUSCULOSKELETAL: Bilateral total hip arthroplasties. Moderate degenerative changes cervical and lumbar spine.   IMPRESSION:   CONCLUSION:   1. 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 Reactive middle mediastinal and bilateral hilar lymphadenopathy.   2. 3.5 cm cavitary right lower lobe pulmonary mass has been enlarging slowly since 2010 and is suspicious for low-grade pulmonary adenocarcinoma. No definite evidence of metastases, but reactive FDG activity in right hilar and mediastinal lymph nodes   from interstitial lung disease could obscure underlying tumor metabolism.   3. 1.1 cm partially cavitary opacity in the superior segment of the left lower lobe is indeterminate. Its appearance and FDG activity are similar the suspected tumor in the contralateral right lung and a synchronous tumor is favored, but it could also   be a focal area of inflammation from the interstitial lung disease.    CT CHEST WO CONTRAST 8/15/2016 7:56 AM  COMPARISON: Chest CT dated 12/4/2015  LUNGS AND PLEURA: Central airways are patent. Mild bronchiectasis is again noted. No bronchial wall thickening. Basilar predominant reticular opacities with architectural distortion and traction bronchiectasis and bronchiolectasis have not significantly   changed. A subtle background of groundglass has minimally increased. A cavitary mass in the peripheral right lower lobe has increased in size, measuring 4.4 cm x 2.3 cm (previously 4.1 cm x 1.7 cm; series 3 image 90). A 2.9 cm x 1.0 cm opacity in the   peripheral posterior right upper lobe previously measured 1.4 cm x 0.6 cm (image 53). A peripheral opacity in the medial superior segment of the left lower lobe measuring 1.5 cm x 0.8 cm previously measured 1.1 cm x 0.9 cm (image 68).  MEDIASTINUM: Atherosclerotic disease including significant LAD coronary artery disease. Scattered additional coronary calcifications  noted. Mediastinal lymphadenopathy has increased. A 1.5 cm right lower paratracheal lymph node previously measured 1.2 cm. A 1.2 cm subcarinal lymph node previously measured 1.0 cm. There is a small amount of pericardial fluid.  LIMITED UPPER ABDOMEN: Negative.  MUSCULOSKELETAL: Osteopenia and multilevel vertebral body degenerative change. There is grade 1 anterolisthesis of C7 on T1.  CONCLUSION:  1. Increasing size of the right lower lobe cavitary mass, favoring low-grade neoplasm. Additional peripheral opacities have increased in size as well, which may represent progression of fibrosis or slow-growing neoplasms.  2. Increased mediastinal lymphadenopathy.  3. Pulmonary fibrosis in a NSIP pattern. Increased groundglass represents an active/cellular component.  4. Atherosclerotic disease living coronary artery disease.  5. Osteopenia and degenerative change.    PET CT scan 11/7/2016  1. Right upper lobe wedge resection has been performed.  2. Right lower lobe pulmonary mass has enlarged and increased in FDG activity. Fiducial markers have been placed in it.  3. Chronic interstitial lung disease has progressed slightly.  4. 1.1 cm opacity in the periphery of the superior segment of the left lower lobe has decreased in FDG activity and could be a focus of inflammation as a part of the interstitial lung disease, but a tumor at this site is not excluded.    Chest CT scan 11/12/2016  1. No evidence of pulmonary emboli.  2. Compared to the PET/CT done 5 days ago, there has been little change in the appearance of either chest. Specifically, patient's had wedge resection of the right upper lobe pleural-based opacity. Focal pneumatocele or loculated pneumothorax noted adjacent to the staple line with a complex, very small hydropneumothorax again noted in the right chest. Tiny subcutaneous emphysema with air tracking into the chest wall anteriorly at rib 4 is again seen likely site of chest tube.   3. Patient has underlying  UIP but has developed focal interstitial and groundglass opacities in the left upper lobe and left lower lobe though unchanged since the PET CT, this is new since the August study and findings suggest an acute component of his interstitial disease. The lack of symmetry suggest this is not simply superimposed pulmonary edema. Suggest pulmonary consultation.  4. Cavitary pleural-based mass in the right lower lobe fiducial markers mild mediastinal adenopathy is unchanged.    CTA CHEST PE RUN 12/11/2016 1:38 AM  INDICATION: Shortness of breath.  COMPARISON: Chest CT 08/15/2016.  ANGIOGRAM CHEST: No pulmonary emboli.  LUNGS AND PLEURA: Interstitial lung disease is present with subpleural reticular nodular densities and scattered groundglass opacities. These findings have progressed since the prior exam. Stable bronchiectasis. 3.7 x 2.9 cm lobulated and partially cavitary mass in the right lower lobe has increased in size. Fiduciary markers are now present. 9 mm nodule in the anterior right lower lobe (image 242) has increased in size, interval right upper lobe wedge resection with loculated pleural air, 5 mm   lingular nodule increased in size. Tiny right pleural effusion.  MEDIASTINUM: Mediastinal and right hilar lymphadenopathy is slightly decreased.  LIMITED UPPER ABDOMEN: Negative.  MUSCULOSKELETAL: Negative.  IMPRESSION:   1. No pulmonary emboli.  2. Partially cavitated malignant mass in the right lower lobe is mildly increased in size measuring 3.7 x 2.9 cm. Fiducial markers are now present within the mass. Progression of interstitial lung disease. New postsurgical changes right upper lobe with loculated pleural air in this location. There is a new tiny right pleural effusion.  3. 9 mm nodule anterior right lower lobe is increased in size as has a 5 mm nodule in the lingula.  4. Mediastinal lymphadenopathy is decreased    CTA CHEST PE RUN 3/12/2017 10:42 AM   INDICATION: Shortness of breath, history of lung  cancer  COMPARISON: Chest CT dated 3/6/2017.  ANGIOGRAM CHEST: Negative for pulmonary emboli. Negative for thoracic aortic dissection. There is mild enlargement of the proximal descending thoracic aorta, which is unchanged. There is atherosclerotic disease including coronary artery calcification.   Aortic tortuosity is noted.  LUNGS AND PLEURA: No significant change from the prior study. Extensive groundglass, reticular, and confluent opacities throughout the lungs. Nodule in the peripheral left upper lobe. Mass along the posterior right hemithorax. No pneumothorax.  MEDIASTINUM: The heart is mildly enlarged. Mediastinal and right hilar lymphadenopathy persists.  LIMITED UPPER ABDOMEN: Normal adrenal glands.  MUSCULOSKELETAL: Osteopenia.  CONCLUSION:  1. No PE.  2. Otherwise no change from the prior study.    CTA CHEST PE RUN  4/25/2017 11:54 AM  INDICATION: dyspnea  TECHNIQUE: Helical acquisition through the chest was performed during the arterial phase of contrast enhancement using IV contrast. 2D and 3D reconstructions were performed by the CT technologist. Dose reduction techniques were used.   IV CONTRAST: Iohexol (Omni) 100 mL  COMPARISON: 3/12/2017  FINDINGS:  ANGIOGRAM CHEST: Negative for pulmonary emboli. Negative for thoracic aortic dissection and aneurysms.  LUNGS AND PLEURA: Marked fibrosis with varicoid bronchiectasis throughout the right lung, and associated parenchymal opacities, mildly progressed when compared to previous. A small right pleural effusion is also larger. Postop changes in the right lung and right sided fiducial markers again seen. There is a new fiducial marker adjacent to the lingular nodule that was recently biopsied. This nodule measures 1.5 cm. Mild peripheral fibrosis in the left lung, similar to previous. No pneumothorax.  MEDIASTINUM: There are several mildly prominent mediastinal lymph nodes, unchanged from previous. Significant coronary artery calcification.  LIMITED UPPER  ABDOMEN: Negative.   MUSCULOSKELETAL: Negative.  CONCLUSION:  1.  No pulmonary embolus.  2.  Stable lingular nodule, now with adjacent fiducial marker.  3.  Increasing pulmonary opacities throughout the right lung, superimposed on chronic fibrosis. Findings may represent progressive fibrosis or superimposed infiltrate.    PFTs (12/23/2015)  FEV1/FVC is 85% and is normal.   FEV1 is 3.26L (92%) predicted and is normal.   FVC is 3.83L (92%) predicted and normal.   There was no improvement in spirometry after a single inhaled dose of bronchodilator.   TLC is 5.27L (70%) predicted and is reduced.   RV is 11.41L (47%) predicted and is reduced.   DLCO is 15.62ml/min/hg (63%) predicted and is reduced when it is corrected for hemoglobin.   Flow volume loops indicate no abnormalities.    PFTs (11/4/2016)  FEV1/FVC is 87 and is normal.  FEV1 is 2.60 L (89%) predicted and is normal.  FVC is 2.98 L (75%) predicted and normal.  There was no improvement in spirometry after a single inhaled dose of bronchodilator.  TLC is 4.82 L (66%) predicted and is reduced.  RV is 1.79 L (61%) predicted and is reduced.  DLCO is 54% predicted and is reduced when it is corrected for hemoglobin.

## 2021-06-11 NOTE — PROGRESS NOTES
Patient seen in clinic for continued HF education.  Patient and his wife viewed 'What is Heart Failure' video which covers risk factors, symptoms, medications, Na and fluid guidelines, balancing activity and rest, and daily monitoring of symptoms.  He is following a low sodium diet. Addressed patients questions/concerns- will continue to reinforce HF education with future patient interactions.

## 2021-06-11 NOTE — PROGRESS NOTES
Patient oxygen saturation on RA at rest is 96%.  Oxygen saturation after ambulating 300ft on RA is 89-90%.    Patient is ambulatory within his/her home.     ...........................Sy Aniket FAN 6/30/2017 8:40 AM

## 2021-06-12 NOTE — PROGRESS NOTES
[facility] Hematology and Oncology Progress Note    Patient: Kartik Tam  MRN: [id number]  Date of Service:  August 10, 2017      Assessment and Plan:    Squamous cell carcinoma of right lung    Staging form: Lung, AJCC 7th Edition      Clinical: No stage assigned - Unsigned      Pathologic stage from 11/7/2016: Stage IIIA (T4, N0, cM0) - Signed by John Andre MD on 11/7/2016    1.  Squamous cell carcinoma of the right lung: We reviewed his imaging.  From a cancer standpoint things look okay.  The most recent CyberKnife to area in the left lung has resolved.  I do not see any definite evidence of new or progressive nodularity.  We will see him back in 4 months with a repeat scan.      2.  Increasing dyspnea and fatigue: He recently saw his cardiologist.  Digoxin was increased for better rate control.  We also had a joni discussion today about his quality of life.  At this point he puts by far more emphasis on quality of life than length of life.  He is on prednisone 10 mg daily.  He asked if increasing this might help his breathing.  I thought it might so we went ahead and changed his prednisone dose to 20 mg daily.  We will see how he does with this.  I told him that some of the more common side effects might include tremor, restlessness, and insomnia.  He will let us know if he has any perceived side effects from the prednisone.    3.  Pneumothorax: Incidental finding on his CT.  It is very small.  Asymptomatic.  We will leave it for now as I assume it will resolve on its own.  I did review the findings with the patient.  I told him that if he has worsening chest pain, cough, or shortness of breath he needs to call us immediately.  He voiced understanding.    ECOG Performance   ECOG Performance Status: 1    Distress Assessment  Distress Assessment Score: No distress    Pain  Currently in Pain: No/denies    Diagnosis:    1.  Squamous cell carcinoma of the right lung: Multifocal.  Diagnosed in September  2016.  Right upper lobe and right lower lobe.   2.  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    Treatment:    1.  Right upper lobe wedge resection.  Right lower lobe biopsy.  September 2016.  2.  CyberKnife to the right upper lobe area and also the right lower lobe.  Completed January 9, 2017.  3.  CyberKnife to peripheral lingular nodule.  Treatment dates May 19 - May 31.  5000 cGy delivered.    Interim History:    Gunnar is here today for a follow-up visit.  He finished CyberKnife about 2-1/2 months ago.  This was delivered to a left-sided lung nodule.  Since then he has been feeling okay.  Continues to have some intermittent dizziness.  Also shortness of breath with minimal exertion.  This is most bothersome for him.  Denies any pain.  Is not complaining of any distress.  He notes that when he was first diagnosed as cancer his prednisone was increased for radiation pneumonitis and he felt very good on that.    Review of Systems:    Constitutional  Constitutional (WDL): Exceptions to WDL  Fatigue: Fatigue relieved by rest  Fever: None  Chills: None  Weight Gain: None  Weight Loss: to <10% from baseline, intervention not indicated (3# since 8/9/17)  Neurosensory  Neurosensory (WDL): Exceptions to WDL  Peripheral Motor Neuropathy: None  Ataxia: Asymptomatic, clinical or diagnostic observations only, intervention not indicated (uses cane)  Peripheral Sensory Neuropathy: None  Confusion: None  Syncope: None  Cardiovascular  Cardiovascular (WDL): Exceptions to WDL (irreg HR-Hx Afib.  Digoxin increased yesterday.  )  Pulmonary  Respiratory (WDL): Exceptions to WDL  Cough: Mild symptoms, nonprescription intervention indicated (occ dry cough)  Dyspnea: Shortness of breath with minimal exertion, limiting instrumental ADL  Hypoxia: Decreased oxygen saturation with exercise (e.g., pulse oximeter <88%), intermittent supplemental oxygen (initial O2 sat 88% with exertion/with  rest-92%)  Gastrointestinal  Gastrointestinal (WDL): All gastrointestinal elements are within defined limits  Genitourinary  Genitourinary (WDL): All genitourinary elements are within defined limits  Integumentary  Integumentary (WDL): All integumentary elements are within defined limits  Patient Coping  Patient Coping: Accepting  Accompanied by  Accompanied by: Family Member    Past History:    Past Medical History:   Diagnosis Date     A-fib 12/05/2014     Bladder cancer      Bronchiectasis RLL      Cataract      Colon polyp      Coronary artery disease      Dementia?      DJD (degenerative joint disease) hips      Hearing impaired + tinnitus      High cholesterol      Hyperlipidemia      Hypertension     pt. denies     Hypothyroidism     hypo     Lung cancer      Lung nodule     RLL     Micturition syncope      Osteopenia      Pneumonitis     acute radiation     Pneumothorax      Pneumothorax, postoperative 11/10/2016     Pulmonary fibrosis      Shingles      Shortness of breath      Physical Exam:    Recent Vitals 8/10/2017   Height -   Weight -   BSA (m2) -   BP -   Pulse -   Temp -   Temp src -   SpO2 92   Some recent data might be hidden     General: patient appears stated age of 82 y.o.. Nontoxic and in no distress.   HEENT: Head: atraumatic, normocephalic. Sclerae anicteric.  Chest:  Normal respiratory effort  Cardiac:  No edema.   Abdomen: abdomen is non-distended  Extremities: normal tone and muscle bulk.  Skin: no lesions or rash. Warm and dry.   CNS: alert and oriented x3. Grossly non-focal.   Psychiatric: normal mood and affect.     Lab Results:    Recent Results (from the past 168 hour(s))   ECG Clinic - Today   Result Value Ref Range    SYSTOLIC BLOOD PRESSURE  mmHg    DIASTOLIC BLOOD PRESSURE  mmHg    VENTRICULAR RATE 81 BPM    ATRIAL RATE 108 BPM    P-R INTERVAL  ms    QRS DURATION 90 ms    Q-T INTERVAL 350 ms    QTC CALCULATION (BEZET) 406 ms    P Axis  degrees    R AXIS 11 degrees    T AXIS 150  degrees    MUSE DIAGNOSIS       Atrial fibrillation  Non-specific ST & T wave changes  Abnormal ECG  When compared with ECG of 25-APR-2017 09:57,  has not changed  Confirmed by RIAZ OCHOA MD LOC: ([id number]) on 8/9/2017 6:55:51 PM     INR   Result Value Ref Range    INR 2.60 (H) 0.90 - 1.10   Digoxin level   Result Value Ref Range    Digoxin 2.0 0.5 - 2.0 ng/mL     Imaging:    CT scan images were personally reviewed.  Left lung nodule has resolved.  Some more scarring around the fiducial there.  No worsening or new pulmonary nodules.    Ct Chest With Contrast    Result Date: 8/9/2017  CT CHEST W CONTRAST 8/9/2017 9:18 AM INDICATION: Stage IIIa multifocal squamous carcinoma right lung diagnosed November 20, 2016. TECHNIQUE: Routine chest. Dose reduction techniques were used. IV CONTRAST: Iohexol (Omni) 75mL COMPARISON: 4/25/2017 FINDINGS: LUNGS AND PLEURA: Patient's developed a tiny hydropneumothorax on the right. Trace right-sided effusion has decreased in size. No appreciable change in the fibrosis and the varicose bronchiectasis in the right upper and lower lobes. However, decrease in the superimposed airspace opacities throughout the right lung. Fiducial markers are again noted. Resolution of the pleural-based nodule in the lingula adjacent to the fiducial marker. Nothing measurable remains. Mild subpleural fibrosis noted throughout the left lung, unchanged. MEDIASTINUM: Mildly prominent mediastinal nodes are unchanged, none over a centimeter in size. No central pulmonary emboli. Volume loss of slight shift of the mediastinum into the right chest is unchanged. LIMITED UPPER ABDOMEN: Unremarkable. MUSCULOSKELETAL: No suspicious lesions.     CONCLUSION: 1.  Patient's developed a tiny right apical pneumothorax. Decrease in the very small right-sided pleural effusion. Also decrease in airspace opacities throughout the right chest. 2.  Resolution of the pleural-based lingular nodule. 3.  No change in the  extensive fibrosis and varicoid bronchiectasis in the right lung and mild subpleural fibrosis scattered throughout the left lung. 4.  Findings discussed with Kerri who works with Dr. Andre at 1230. Patient is to see him in clinic tomorrow.      Signed by: John Andre MD

## 2021-06-12 NOTE — PROGRESS NOTES
Pt ambulatory to radiation clinic using cane for follow up, accompanied by daughter. VSS, denies pain. Oxygen in high 80's after walking, able to get up into low 90's when resting. Further recommendations and orders per provider.

## 2021-06-12 NOTE — PROGRESS NOTES
[facility] Radiation Oncology Consult Note    Patient: Kartik Tam  MRN: [id number]  Date of Service: 08/18/2017    Assessment / Impression     1. Squamous cell carcinoma of right lung       Squamous cell carcinoma of right lung    Staging form: Lung, AJCC 7th Edition    - Clinical: No stage assigned - Unsigned    - Pathologic stage from 11/7/2016: Stage IIIA (T4, N0, cM0) - Signed by John Andre MD on 11/7/2016  ECOG Peformance Status  ECOG Performance Status: 1  Distress Assessment Score: No distress    Plan:   Continue the 20 mg prednisone for now. To see Dr. Hill in October and taper can be given then.   F/u prn     Face to face time  25 minutes with > 75% spent on consultation, education and coordination of care.      Subjective:      HPI: Kartik Tam is a 82 y.o. male with pulmonary fibrosis was recently found to have squamous cell carcinoma in his right lung. He has been having serial CT scans to follow some masses. They have been slowly enlarging. He went on to have a biopsy and resection on September 26. He was found to have 2 separate areas of squamous cell carcinoma. Multiple lymph nodes sampled were negative.      Lung cancer  PET (+) RLL mass, RUL and LLL nodules. (+) mediastinal adenopathy.   S/p EBUS with negative pathology results.   S/p wedge biopsy of RLL mass and RUL nodule, path (+) Squamous cell lung cancer. Diagnosed with stage IIIA. S/p stereotactic radiosurgery RUL and RLL December 2016.     Enlarging peripheral lingular nodular lesion  S/p CT guided biopsy (+) 4/2017 Squamous cell carcinoma  S/p Cyberknife therapy May 2017    Radiation induced pneumonitis  Stable. Dr. Andre recently increased prednisone from 10-20mg, feels better on higher dose.     CT 8/9/2017 shows stable treatment changes on right and resolution of the lingular lesion on left.         Current Outpatient Prescriptions   Medication Sig Dispense Refill     predniSONE (DELTASONE) 20 MG tablet Take 20 mg by  mouth daily.       aspirin 81 MG EC tablet Take 81 mg by mouth daily.       atorvastatin (LIPITOR) 20 MG tablet Take 1 tablet (20 mg total) by mouth at bedtime. 90 tablet 3     digoxin (LANOXIN) 125 mcg tablet Take 1 tablet (125 mcg total) by mouth every morning. 90 tablet 3     furosemide (LASIX) 40 MG tablet Take 1 tablet (40 mg total) by mouth 2 (two) times a day at 9am and 6pm. 180 tablet 3     levothyroxine (SYNTHROID, LEVOTHROID) 150 MCG tablet TAKE 1 TABLET (150 MCG TOTAL) BY MOUTH DAILY AT 6:00 AM. 90 tablet 2     metoprolol succinate (TOPROL-XL) 25 MG Take 0.5 tablets (12.5 mg total) by mouth 2 (two) times a day. 45 tablet 3     omeprazole (PRILOSEC) 20 MG capsule Take 1 capsule (20 mg total) by mouth daily. 90 capsule 3     warfarin (COUMADIN) 2.5 MG tablet Take 2.5mg (1 tab) on Tue Thur Sat, and 3.75mg (1 and 1/2 tabs) on all other days.  OR AS DIRECTED.  Adjust dose based on INR. 120 tablet 1     No current facility-administered medications for this visit.      Past Medical History:   Diagnosis Date     A-fib 12/05/2014     Bladder cancer      Bronchiectasis RLL      Cataract      Colon polyp      Coronary artery disease      Dementia?      DJD (degenerative joint disease) hips      Hearing impaired + tinnitus      High cholesterol      Hyperlipidemia      Hypertension     pt. denies     Hypothyroidism     hypo     Lung cancer      Lung nodule     RLL     Micturition syncope      Osteopenia      Pneumonitis     acute radiation     Pneumothorax      Pneumothorax, postoperative 11/10/2016     Pulmonary fibrosis      Shingles      Shortness of breath      Past Surgical History:   Procedure Laterality Date     APPENDECTOMY       Attempted lung  biopsy  04/19/2017     BLADDER SURGERY  2010     CARDIAC CATHETERIZATION  08/02/2016    stent x 1     CARDIAC ELECTROPHYSIOLOGY STUDY AND ABLATION       COLONOSCOPY       CORONARY STENT PLACEMENT       JOINT REPLACEMENT Bilateral     Hips     LUNG CANCER SURGERY   12/2016 and 01/2017    Cyber Knife-9 treatments     VA CYSTOURETHROSCOPY,FULGUR <0.5 CM LESN N/A 1/25/2016    Procedure: CYSTOSCOPY BLADDER BIOPSIES TRANSURETHRAL RESECTION BLADDER TUMOR;  Surgeon: Antoine Rodriguez MD;  [place] [facility] [place]  Service: Urology     THORACOSCOPY Right 9/26/2016    Procedure:   RIGHT THORACOSCOPY/ RIGHT UPPER LOBE AND RIGHT LOWER LOBE WITH NEEDLE WIRE LOCALIZATION MULTIPLE WEDGE RESECTION NODE SAMPLING FIDUCIALS PLACEMENT;  Surgeon: Brandon Pavon MD;  Location: [facility];  Service:      THORACOSCOPY Right 9/26/2016    Procedure: THORACOSCOPY FOR CONTROL POST OPERATIVE BLEED;  Surgeon: Brandon Pavon MD;  Location: [facility];  Service:      [place]illins  Family History   Problem Relation Age of Onset     Diabetes Mother      Heart disease Mother      No Medical Problems Father      Colon cancer Brother      Throat cancer Brother      Social History     Social History     Marital status:      Spouse name: N/A     Number of children: 3     Years of education: N/A     Occupational History     Not on file.     Social History Main Topics     Smoking status: Former Smoker     Packs/day: 3.00     Years: 30.00     Quit date: 12/5/1979     Smokeless tobacco: Never Used     Alcohol use No      Comment: alcoholism in the past, quit drinking 1972      Drug use: No     Sexual activity: Not on file     Other Topics Concern     Not on file     Social History Narrative        Review of Systems:        General  Constitutional (WDL): Exceptions to WDL  Fatigue: Fatigue relieved by rest  EENT  Eye Disorder (WDL): Exceptions to WDL (wears glasses)  Blurred Vision: Intervention not indicated  Ear Disorder (WDL): Exceptions to WDL  Tinnitus: Mild symptoms, intervention not indicated (chronic)  Respiratory       Respiratory (WDL): Exceptions to WDL  Cough: Mild symptoms, nonprescription intervention indicated  Dyspnea: Shortness of breath with minimal exertion, limiting  instrumental ADL  Hypoxia: Decreased oxygen saturation at rest (e.g., pulse oximeter <88% or PaO2 <  Cardiovascular  Cardiovascular (WDL): Exceptions to WDL (HR irreg, on Digoxin)  Endocrine     Gastrointestinal  Gastrointestinal (WDL): All gastrointestinal elements are within defined limits  Musculoskeletal  Musculoskeletal and Connetive Tissue Disorders (WDL): All Musculoskeletal and Connetive Tissue Disorder elements are within defined limits  Integumentary               Integumentary (WDL): All integumentary elements are within defined limits  Neurological  Neurosensory (WDL): Exceptions to WDL  Ataxia: Asymptomatic, clinical or diagnostic observations only, intervention not indicated (uses cane)  Psychological/Emotional   Patient Coping: Accepting  Hematological/Lymphatic  Lymph (WDL): All lymph disorder elements are within defined limits  Dermatologic     Genitourinary/Reproductive  Genitourinary (WDL): All genitourinary elements are within defined limits  Reproductive     Pain              Currently in Pain: No/denies  Accompanied by  Accompanied by: Family Member      Objective:     Physical Exam    Vitals:    08/18/17 1121   BP: 120/71   Pulse: 82   Temp: 97.4  F (36.3  C)   TempSrc: Oral   SpO2: 92%   Weight: 175 lb 1.6 oz (79.4 kg)       General: patient appears stated age of 82 y.o.. Nontoxic and in no distress.   HEENT: Head: atraumatic, normocephalic. Sclerae anicteric.  Chest:  Normal respiratory effort, CTA   Cardiac:  No edema.   Abdomen: abdomen is non-distended  Extremities: normal tone and muscle bulk.  Skin: no lesions or rash. Warm and dry.   CNS: alert and oriented x3. Grossly non-focal.   Psychiatric: normal mood and affect.      Recent Labs:   Recent Results (from the past 168 hour(s))   INR   Result Value Ref Range    INR 2.60 (H) 0.90 - 1.10   Digoxin level   Result Value Ref Range    Digoxin 2.0 0.5 - 2.0 ng/mL       Imaging: Imaging results 30 days: Ct Chest With Contrast    Result Date:  8/9/2017  CT CHEST W CONTRAST 8/9/2017 9:18 AM INDICATION: Stage IIIa multifocal squamous carcinoma right lung diagnosed November 20, 2016. TECHNIQUE: Routine chest. Dose reduction techniques were used. IV CONTRAST: Iohexol (Omni) 75mL COMPARISON: 4/25/2017 FINDINGS: LUNGS AND PLEURA: Patient's developed a tiny hydropneumothorax on the right. Trace right-sided effusion has decreased in size. No appreciable change in the fibrosis and the varicose bronchiectasis in the right upper and lower lobes. However, decrease in the superimposed airspace opacities throughout the right lung. Fiducial markers are again noted. Resolution of the pleural-based nodule in the lingula adjacent to the fiducial marker. Nothing measurable remains. Mild subpleural fibrosis noted throughout the left lung, unchanged. MEDIASTINUM: Mildly prominent mediastinal nodes are unchanged, none over a centimeter in size. No central pulmonary emboli. Volume loss of slight shift of the mediastinum into the right chest is unchanged. LIMITED UPPER ABDOMEN: Unremarkable. MUSCULOSKELETAL: No suspicious lesions.     CONCLUSION: 1.  Patient's developed a tiny right apical pneumothorax. Decrease in the very small right-sided pleural effusion. Also decrease in airspace opacities throughout the right chest. 2.  Resolution of the pleural-based lingular nodule. 3.  No change in the extensive fibrosis and varicoid bronchiectasis in the right lung and mild subpleural fibrosis scattered throughout the left lung. 4.  Findings discussed with Kerri who works with Dr. Andre at 1230. Patient is to see him in clinic tomorrow.      Pathology:   Results for orders placed or performed during the hospital encounter of 09/26/16 (from the past 8760 hour(s))   Surgical Pathology Exam   Result Value    Case Report      Surgical Pathology Report                         Case: [id number]                                    Authorizing Provider:  Brandon Pavon MD         Collected:            09/26/2016 1057              Ordering Location:     [facility] OR   Received:            09/26/2016 1103              Pathologist:           Yan Freed MD PhD                                                        Specimens:   A) - Lung, Upper Lobe, Right                                                                        B) - Lung, Lower Lobe, Right                                                                        C) - Lung, Right , R3 node                                                                          D) - Lung, Right , R3 node number 2                                                                 E) - Lung, Right , R3 node number 3                                                                 F) - Lung, Right , R3 node number 4                                                                  G) - Lung, Right , station 7 number 1                                                               H) - Lung, Right , station 7 number 2                                                               I) - Lung, Lower Lobe, Right, Additional Right Wedge Lower Lobe                            Amendment      AMENDMENT COMMENT:  The stage was incorrectly listed in the tumor template. Due to the multifocal tumor in different lobes the stage should correctly reflect pT4. There are no additional changes to this report.    Addendum      Additional information from tumor conference of 3-16-17.  The overall tumor differentiation is moderate as described below and in the [facility] consultation report,  with easily identified keratinization.  In addition there are small foci of high grade nuclear cytology and pleomorphism in excess of that usually seen in moderately differentiated tumors.  Interestingly the tumor cells with the high grade nuclear cytology also have identifiable cytoplasmic keratinization and intercellular bridges.    Final Diagnosis      A)  LUNG, RIGHT UPPER LOBE, WEDGE  EXCISION         -  SQUAMOUS CELL CARCINOMA         -  BACKGROUND USUAL INTERSTITIAL PNEUMONIA         -  TUMOR FOCALLY PRESENT AT INKED STAPLE LINE         -  ELASTIC STAIN NEGATIVE FOR PLEURAL TUMOR INVOLVEMNT    B)  LUNG, RIGHT LOWER LOBE, BIOPSY         -  SMALL FOCUS OF SQUAMOUS CELL CARCINOMA         -  BACKGROUND FIBROSIS, INFLAMMATION AND NECROSIS     C)  LYMPH NODE, R3, BIOPSY         -  ONE LYMPH NODE, NEGATIVE FOR TUMOR     D)  R3 LYMPH NODE, R3 NODE #2, BIOPSY         -  ONE LYMPH NODE, NEGATIVE FOR TUMOR     E)  LYMPH NODE, R3 #3, BIOPSY         -  FRAGMENTS OF ONE LYMPH NODE, NEGATIVE FOR TUMOR    F)  LYMPH NODE, R3 #4, BIOPSY         -  ONE LYMPH NODE NEGATIVE FOR TUMOR    G)  LYMPH NODE, R7, BIOPSY         -  ONE LYMPH NODE NEGATIVE FOR TUMOR    H)  LYMPH NODE, R7 #2, BIOPSY         -  ONE LYMPH NODE NEGATIVE FOR TUMOR    I)  RIGHT LOWER LOBE, ADDITIONAL WEDGE BIOPSY         -  BENIGN LUNG WITH USUAL INTERSTITIAL PNEUMONIA     [facility]    Comment Please see the separate [facility] report    Synoptic Report      LUNG: Resection  (Lung - All Specimens)         SPECIMEN      Specimen:    Lung      Procedure:    Wedge resection      Specimen Integrity:    Intact      Specimen Laterality:    Right      Primary Tumor Site:    Upper lobe      Primary Tumor Site:    Lower lobe      Tumor Focality:    Separate tumor nodules in different lobe / lung (specify sites): RUL, RLL      TUMOR      Histologic Type (Note B):    Squamous cell carcinoma      Histologic Grade:    G2: Moderately differentiated      Tumor Extent      Tumor Size:    Greatest dimension (cm): 2.3 cm      Visceral Pleura Invasion:    Not identified      Tumor Extension:    Not identified      MARGINS      Bronchial Margin:    Not applicable      Vascular Margin:    Not applicable      Parenchymal Margin:    Involved by invasive carcinoma      Other Attached Tissue Margin:    Not applicable      Accessory Tumor Findings      Treatment Effect:    No  "prior treatment      Lymph-Vascular Invasion:    Not identified      LYMPH NODES      Extranodal Extension:    Not identified: all nodes negative      STAGE (pTNM)      TNM Descriptors:    m (multiple primary tumors)      Primary Tumor (pT):    pT1b: Tumor greater than 2 cm, but 3 cm or less in greatest dimension, surrounded by lung or visceral pleura, without bronchoscopic evidence of invasion more proximal than the lobar bronchus (i.e., not in the main bronchus)      Regional Lymph Nodes (pN):    pN0: No regional lymph node metastasis        Regional Lymph Nodes:              Number of Lymph Nodes Examined:    Specify number: 6          Lymph Node Involvement:    No nodes involved      Distant Metastases (pM):    Not applicable - pM cannot be determined from the submitted specimen(s)      ADDITIONAL FINDINGS      Additional Pathologic Finding(s):    Other (specify): [facility]      Clinical Information      Pre-op Diagnosis: Adenomatosis, 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 There is an indurated nodule centrally placed. A staple line runs the length of the specimen and is removed, and the margin is inked black for identification. The pleural surface is centrally puckered and is inked blue for orientation. A localizing wire is present through the middle of the lesion. Sectioning beneath the firm and puckered pleura reveals an indurated and somewhat stellate infiltrative-appearing mass occupying the alveolar parenchyma, being 2.3 x 1.5 x 1.0 cm. The lesion grossly appears to be less than a millimeter from the parenchymal margin adjacent to the staple line. Representative sections are submitted. " "TE-10C    BLOCK LABELS: A1) Frozen section of central area of nodule; A2-10) Remainder of tissue.    FROZEN SECTION DIAGNOSIS:  A) NON-SMALL CELL CARCINOMA, CONSISTENT WITH  SQUAMOUS CELL CARCINOMA, 1 MM TO STAPLE LINE MARGIN - DR. ABBOTT TO DR. BOSTON AT 11:25 AM    B) The specimen is labeled with the patient's name, is labeled with the patient's name, \"right lower lobe,\" is received fresh for frozen section, and consists of a 2 x 1 x 0.3 cm fragment of soft tan red tissue. TE-1C for frozen section.       FROZEN SECTION DIAGNOSIS:   B) NON-SMALL CELL CARCINOMA CONSISTENT WITH SQUAMOUS CELL CARCINOMA - DR. ABBOTT TO DR. BOSTON AT 11:40 AM    C)  The specimen is labeled with the patient's name, \"R3,\" is received in formalin, and consists of a single fragment of red-pink and gray juhi tissue, 1.0 x 0.5 x 0.2 cm. TE-1C     D) The specimen is labeled with the patient's name, \"R3 node #2,\" is received in formalin, and consists of a single fragment of soft red-gray tissue, 7 x 4 x 2 mm. TE-1C     E) The specimen is labeled with the patient's name, \"R3 #3,\" is received in formalin, and consists of soft red juhi tissue 2.0 x 0.7 x 0.3 cm. TE-1C     F)  The  specimen is labeled with the patient's name, \"lung right R3 node, #4\" is received fresh for frozen section, and consists of soft red tissues, in aggregate 1.0 x 0.6 x 0.3 cm. TE-1C for frozen section.   CTM:[facility]    FROZEN SECTION DIAGNOSIS:   F) BENIGN LYMPH NODE, NO EVIDENCE OF MALIGNANCY - DR. ABBOTT TO DR. BOSTON AT 11:55 AM.    G)  The specimen is labeled with the patient's name, \"R7,\" is received in formalin, consisting of a 1.0 x 0.6 x 0.2 cm red and gray anthracotic node. TE-1C    H)  The specimen is labeled with the patient's name, \"R7 #2,\" is received in formalin, consists of a 5 x 4 x 1.5 mm wedge of pink and black tissue. TE-1C    I)  The specimen is labeled with the patient's name, \"additional right lower lobe,\" is received in formalin, and consists " of a wedge excision of red to pink lung, stapled along one edge. The portion of visible parenchyma is 6 x 2 x 2 cm. The staple lines are removed. The cut surface shows rubbery pink-tan tissue without discrete mass. There is  some irregular fibrous scarring. SS&TE-4C  CTM:kjc    Charges      CPT:   34004 ×2, 90592 ×2, 38531 ×7  ICD-10:  C34.11  PQRS:     Result Flag Malignant (!)     Comment:   SPECIMEN PROCESSING:    All histology slide preparation, stains and image analysis done              at [facility] are performed at [facility], [address] [place] [address], [facility], with final interpretation and              frozen section analysis at the indicated laboratory.             Signed by: Lien Day MD

## 2021-06-13 NOTE — PROGRESS NOTES
We received a PA request for pt's Arnuity Ellipta 200 mcg.  I started PA on CMM's, just waiting for the clinical questions.

## 2021-06-13 NOTE — PROGRESS NOTES
We are putting this PA on hold.  He is not even going to start the Arnuity until he was on 10mg of prednisone.  Once he starts the inhaler, then he will give us a call and let us know how it is working or not and we will change it to another alternative if needed at that time.

## 2021-06-13 NOTE — PROGRESS NOTES
Internal Medicine Office Visit  [facility]  Patient Name: Kartik Tam  Patient Age: 82 y.o.  YOB: 1935  MRN: [id number]    Date of Visit: 10/31/2017  Reason for Office Visit:   Chief Complaint   Patient presents with     Insomnia     had an episode friday night           Assessment / Plan / Medical Decision Makin. Hypothyroidism, unspecified type  2. Essential hypertension  3. Pure hypercholesterolemia  4. Persistent atrial fibrillation  5. Pulmonary fibrosis  6. Chronic anticoagulation, [id number] score 3  - Offered prescription for wheeled walker with a bench so that he can remain active but have a way to sit down if there is not a nearby bench. He will consider this. I believe a wheeled walked would help to keep him active and mobile but also keep him safe if he acutely needs to rest.   - Regarding choking incident and difficulty sleeping/fatigue over the weekend, will continue to observe for now. He does not have any ongoing dysphagia since this isolated incident. Lung sounds are without crackles. He will follow up for cough/fever/chills/increased dyspnea  - Will check TSH with next set of lab work that he has with oncology   - Follow up in 6 months     Health Maintenance Review  Health Maintenance   Topic Date Due     FALL RISK ASSESSMENT  2017     ADVANCE DIRECTIVES DISCUSSED WITH PATIENT  2022     TD 18+ HE  2026     PNEUMOCOCCAL POLYSACCHARIDE VACCINE AGE 65 AND OVER  Completed     INFLUENZA VACCINE RULE BASED  Completed     PNEUMOCOCCAL CONJUGATE VACCINE FOR ADULTS (PCV13 OR PREVNAR)  Completed     ZOSTER VACCINE  Completed         I am having Mr. Tam maintain his aspirin, levothyroxine, digoxin, atorvastatin, omeprazole, warfarin, metoprolol succinate, predniSONE, predniSONE, predniSONE, and furosemide.      HPI:  Kartik Tam is a 82 y.o. year old who presents to the office today for an episode last Friday.    Last Friday  he was drinking some ice water and he choked on the water. Prior to this he was having no difficulty swallowing fluids or solids. He then had uncontrollable coughing and felt cold to his core so he went and got into bed and covered up. Over the next night he felt very chilled but then felt better the next morning. He was able to get out of bed the next morning but felt more tired the rest of the weekend and cancelled his walking with friends this past Monday. Intermittently he has trouble swallowing his pills. He is now sleeping well.    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 He does still have shortness of breath with exertion and has to stop and rest when he goes out for walks.     Review of Systems:  Today negative for fevers, chills, chest pains, pain with inspiration, cough      Current Scheduled Meds:  Outpatient Encounter Prescriptions as of 10/31/2017   Medication Sig Dispense Refill     aspirin 81 MG EC tablet Take 81 mg by mouth daily.       atorvastatin (LIPITOR) 20 MG tablet Take 1 tablet (20 mg total) by mouth at bedtime. 90 tablet 3     digoxin (LANOXIN) 125 mcg tablet Take 1 tablet (125 mcg total) by mouth every morning. 90 tablet 3     furosemide (LASIX) 40 MG tablet Take 1 tablet (40 mg total) by mouth 2 (two) times a day at 9am and 6pm. 180 tablet 3     levothyroxine (SYNTHROID, LEVOTHROID) 150 MCG tablet TAKE 1 TABLET (150 MCG TOTAL) BY MOUTH DAILY AT 6:00 AM. 90 tablet 2     metoprolol succinate (TOPROL-XL) 25 MG Take 0.5 tablets (12.5 mg total) by mouth 2 (two) times a day. 90 tablet 3     omeprazole (PRILOSEC) 20 MG capsule Take 1 capsule (20 mg total) by mouth daily. 90 capsule 3     predniSONE (DELTASONE) 10 mg tablet Take 1 tablet (10 mg total) by mouth daily. 30 tablet 12     predniSONE (DELTASONE) 20 MG tablet Take 1 tablet (20 mg total) by mouth daily. 90 tablet 1     predniSONE  (DELTASONE) 5 MG tablet Take 1 tablet (5 mg total) by mouth daily. 30 tablet 0     warfarin (COUMADIN) 2.5 MG tablet Take 2.5mg (1 tab) on Tue Thur Sat, and 3.75mg (1 and 1/2 tabs) on all other days.  OR AS DIRECTED.  Adjust dose based on INR. 120 tablet 1     [DISCONTINUED] fluticasone furoate (ARNUITY ELLIPTA) 200 mcg/actuation DsDv Inhale 1 puff daily. 30 each 12     No facility-administered encounter medications on file as of 10/31/2017.      Past Medical History:   Diagnosis Date     A-fib 12/05/2014     Bladder cancer      Bronchiectasis RLL      Cataract      Colon polyp      Coronary artery disease      Dementia?      DJD (degenerative joint disease) hips      Hearing impaired + tinnitus      High cholesterol      Hyperlipidemia      Hypertension     pt. denies     Hypothyroidism     hypo     Lung cancer      Lung nodule     RLL     Micturition syncope      Osteopenia      Pneumonitis     acute radiation     Pneumothorax      Pneumothorax, postoperative 11/10/2016     Pulmonary fibrosis      Shingles      Shortness of breath      Past Surgical History:   Procedure Laterality Date     APPENDECTOMY       Attempted lung  biopsy  04/19/2017     BLADDER SURGERY  2010     CARDIAC CATHETERIZATION  08/02/2016    stent x 1     CARDIAC ELECTROPHYSIOLOGY STUDY AND ABLATION       COLONOSCOPY       CORONARY STENT PLACEMENT       JOINT REPLACEMENT Bilateral     Hips     LUNG CANCER SURGERY  12/2016 and 01/2017    Cyber Knife-9 treatments     OH CYSTOURETHROSCOPY,FULGUR <0.5 CM LESN N/A 1/25/2016    Procedure: CYSTOSCOPY BLADDER BIOPSIES TRANSURETHRAL RESECTION BLADDER TUMOR;  Surgeon: Antoine Rodriguez MD;  Location: [facility];  Service: Urology     THORACOSCOPY Right 9/26/2016    Procedure:   RIGHT THORACOSCOPY/ RIGHT UPPER LOBE AND RIGHT LOWER LOBE WITH NEEDLE WIRE LOCALIZATION MULTIPLE WEDGE RESECTION NODE SAMPLING FIDUCIALS PLACEMENT;  Surgeon: Brandon Pavon MD;  Location: [facility];  Service:       THORACOSCOPY Right 9/26/2016    Procedure: THORACOSCOPY FOR CONTROL POST OPERATIVE BLEED;  Surgeon: Brandon Pavon MD;  Location: [facility];  Service:      Social History   Substance Use Topics     Smoking status: Former Smoker     Packs/day: 3.00     Years: 30.00     Quit date: 12/5/1979     Smokeless tobacco: Never Used     Alcohol use No      Comment: alcoholism in the past, quit drinking 1972        Objective / Physical Examination:  Vitals:    10/31/17 0832   BP: 120/70   Pulse: 74   Weight: 174 lb (78.9 kg)     Wt Readings from Last 3 Encounters:   10/31/17 174 lb (78.9 kg)   10/17/17 178 lb (80.7 kg)   10/17/17 177 lb 1.6 oz (80.3 kg)     Body mass index is 24.27 kg/(m^2).     General Appearance: Alert and oriented, cooperative, affect appropriate, speech clear, in no apparent distress  Neck: Supple, trachea midline. No carotid bruits   Lungs: Clear to auscultation bilaterally, respirations unlabored. Some tachypnea with stepping onto exam table. No wheezing   Cardiovascular: Irregular rate, normal S1, S2. No murmurs, rubs, or gallops  Extremities: Pulses 2+ and equal throughout. No edema. Acyanotic       Orders Placed This Encounter   Procedures     Thyroid Stimulating Hormone (TSH)   Followup: Return in about 6 months (around 4/30/2018) for Next scheduled follow up. earlier if needed.        Michelle Tello, CNP

## 2021-06-13 NOTE — PROGRESS NOTES
I called [employer] and we withdrew the PA for the Arnuity (so that this doesn't get denied).  We can always restart this again if pt tries the formulary alternatives and they are not successful.

## 2021-06-13 NOTE — PROGRESS NOTES
[facility] Follow-up Note    Assessment & Plan        1. Coronary artery disease involving native coronary artery of native heart without angina pectoris -angiography August 2 2016 showed normal left main, left anterior descending with a mid 80% stenosis which received a synergy drug-coated stent, normal circumflex and normal right coronary artery.  Stress test performed during hospitalization 2017 showed no scar, no ischemia, and preserved ejection fraction greater than 70%.    No symptoms so continue current therapy.   2. Heart failure with preserved ejection fraction -no signs or symptoms on exam currently, shortness of breath better on furosemide and lower dose of metoprolol.  Continue fluid and salt restriction and weight thermostat around 180 pounds.   4. Persistent atrial fibrillation -asymptomatic, not valvular and possibly permanent. He did not tolerate Multaq or amiodarone due to lung issues as well as sotalol causing QT prolongation. Since it is essentially asymptomatic we will let him have the atrial fibrillation with anticoagulation and rate control with metoprolol and digoxin, digoxin blood level was normal. INR to be drawn today and defer to primary clinic adjusting .   5. Pure hypercholesterolemia -pressure 155 with an LDL of 78 which is acceptable.   6. Essential hypertension -under good control currently.   7. Mass of upper lobe of left lung -squamous cell carcinoma with bilateral involvement, stage IIIB and status post CyberKnife treatments.     Plan  1.  Check INR today.  2.  Renewed Lasix for him today.  3.  Follow-up with me in about 8-10 months or sooner if needed.    Subjective  CC: 82-year-old white gentleman here for follow-up.  Since I seen him he has had his diuretic adjusted as well as his metoprolol adjusted and also had radiation therapy.  He comes in feeling well without any major fatigue, syncope, dizziness, chest discomfort, palpitations, shortness breath, PND,  "orthopnea, or peripheral edema.    Medications  Current Outpatient Prescriptions   Medication Sig     aspirin 81 MG EC tablet Take 81 mg by mouth daily.     atorvastatin (LIPITOR) 20 MG tablet Take 1 tablet (20 mg total) by mouth at bedtime.     digoxin (LANOXIN) 125 mcg tablet Take 1 tablet (125 mcg total) by mouth every morning.     fluticasone furoate (ARNUITY ELLIPTA) 200 mcg/actuation DsDv Inhale 1 puff daily.     furosemide (LASIX) 40 MG tablet Take 1 tablet (40 mg total) by mouth 2 (two) times a day at 9am and 6pm.     levothyroxine (SYNTHROID, LEVOTHROID) 150 MCG tablet TAKE 1 TABLET (150 MCG TOTAL) BY MOUTH DAILY AT 6:00 AM.     metoprolol succinate (TOPROL-XL) 25 MG Take 0.5 tablets (12.5 mg total) by mouth 2 (two) times a day.     omeprazole (PRILOSEC) 20 MG capsule Take 1 capsule (20 mg total) by mouth daily.     predniSONE (DELTASONE) 10 mg tablet Take 1 tablet (10 mg total) by mouth daily.     predniSONE (DELTASONE) 5 MG tablet Take 1 tablet (5 mg total) by mouth daily.     warfarin (COUMADIN) 2.5 MG tablet Take 2.5mg (1 tab) on Tue Thur Sat, and 3.75mg (1 and 1/2 tabs) on all other days.  OR AS DIRECTED.  Adjust dose based on INR.     predniSONE (DELTASONE) 20 MG tablet Take 1 tablet (20 mg total) by mouth daily.       Objective  /60 (Patient Site: Left Arm, Patient Position: Sitting, Cuff Size: Adult Large)  Pulse 80  Resp 18  Ht 5' 11\" (1.803 m)  Wt 178 lb (80.7 kg)  BMI 24.83 kg/m2    General Appearance:    Alert, cooperative, no distress, appears stated age   Head:    Normocephalic, without obvious abnormality, atraumatic   Throat:   Lips, mucosa, and tongue normal; teeth and gums normal   Neck:   Supple, symmetrical, trachea midline, no adenopathy;        thyroid:  No enlargement/tenderness/nodules; no carotid    bruit or JVD   Back:     Symmetric, no curvature, ROM normal, no CVA tenderness   Lungs:     Clear to auscultation bilaterally, respirations unlabored   Chest wall:    No " tenderness or deformity   Heart:   Irregularly irregular, S1 and S2 normal, no murmur, rub   or gallop   Abdomen:     Soft, non-tender, bowel sounds active all four quadrants,     no masses, no organomegaly   Extremities:   Normal, atraumatic, no cyanosis or edema   Pulses:   2+ and symmetric all extremities   Skin:   Skin color, texture, turgor normal, no rashes or lesions     Results    Lab Results personally reviewed   Lab Results   Component Value Date    CHOL 155 11/16/2016    CHOL 220 (H) 10/13/2010     Lab Results   Component Value Date    HDL 62 11/16/2016    HDL 49 10/13/2010     Lab Results   Component Value Date    LDLCALC 78 11/16/2016    LDLCALC 134 (H) 10/13/2010     Lab Results   Component Value Date    TRIG 74 11/16/2016    TRIG 187 (H) 10/13/2010     Lab Results   Component Value Date    WBC 6.8 04/26/2017    HGB 14.4 04/26/2017    HCT 45.5 04/26/2017     04/26/2017     Lab Results   Component Value Date    CREATININE 1.41 (H) 08/08/2017    BUN 24 08/08/2017     08/08/2017    K 3.6 08/08/2017    CO2 29 08/08/2017     Review of Systems:   General: WNL  Eyes: Visual Distubance  Ears/Nose/Throat: WNL  Lungs: Shortness of Breath  Heart: Shortness of Breath with activity, Irregular Heartbeat  Stomach: WNL  Bladder: WNL  Muscle/Joints: Joint Pain  Skin: WNL  Nervous System: Daytime Sleepiness  Mental Health: WNL     Blood: WNL

## 2021-06-13 NOTE — PROGRESS NOTES
PULMONARY OUTPATIENT FOLLOW UP NOTE    Assessment:      1. Lung cancer  PET (+) RLL mass, RUL and LLL nodules. (+) mediastinal adenopathy.   S/p EBUS with negative pathology results.   S/p wedge biopsy of RLL mass and RUL nodule, path (+) Squamous cell lung cancer. Diagnosed with stage IIIA. S/p stereotactic radiosurgery RUL and RLL December 2016.   Enlarging peripheral lingular nodular lesion. S/p CT guided biopsy (+) 4/2017 Squamous cell carcinoma. S/p Cyberknife therapy May 2017  2. Radiation induced pneumonitis  Continue to taper down steroids to chronic prednisone 10 mg daily. Add ICS.   3. Pulmonary fibrosis  Lung biopsy was positive for usual interstitial pneumonia. Labs showed (+) TETE, elevated anti Sm antibody.   Previous PFTs showed mild to moderate restrictive lung disease and moderate reduction of DLCO. Continue low dose prednisone 10 mg daily. Add ICS.   Had flu vaccine.   4. Carcinoma in situ of bladder   5. Ischemic cardiomyopathy s/p drug eluting stent Synergy proximal LAD on 8/2/2016  6. Paroxysmal atrial fibrillation anticoagulated  7. Hx hypothyroidism     Plan:   1. Decrease prednisone dose from 20 mg to 15 mg daily for two weeks then 10 mg daily  2. Start Inhaled steroid (Arnuity 200 mcg) one puff daily, rinse your mouth with water after each use  3. Continue prilosec daily   4. Follow up in 6 months     Chad Pham  Pulmonary / Critical Care  10/17/2017    CC:      Chief Complaint   Patient presents with     Follow Up     3 mo f/u-pt states that Dr. Andre increased his prednisone to 20mg and it has been helping the patient to be more active and walking-pt states that his breathing has been better, he says that blood pressure has been normal and they changed his dosage of metoprolol and that has been helping with his breathing       HPI:       Kartik Tam is an 82 y.o. male who presents for follow up.  Pt has history of ischemic cardiomyopathy s/p stent proximal LAD 8/2/2016,  atrial fibrillation on coumadin, hypothyroidism, carcinomal in situ bladder of bladder, squamous cell lung cancer stage IIIA s/p radiation therapy RUL and RLL lesion, enlarging LLL lesion (+) Squamous cell carcinoma s/p Cyberknife therapy, pulmonary fibrosis, biopsy UIP.   Dose of prednisone was increased to 20 mg daily due to increase shortness of breath. Patient reports improvement of shortness of breath, mild dry cough, his activity level has increased, walking at least close to a mille a day, stops at least three times.   Denies chest pain, orthopnea, PND or swelling of LEs.   Patient was seen by cardiology, dose of metoprolol was decreased with improvement of dizziness.   Denies acid reflux symptoms while taking PPI. No postnasal drip.   Had flu vaccine.  Pt reports previous tobacco user, 1-2 ppd for 30 years, quit 35 years ago. Worked in sales.  Family hx significant for two brothers with malignancy, one with throat cancer and other with colon cancer.     Past Medical History:   Diagnosis Date     A-fib 12/05/2014     Bladder cancer      Bronchiectasis RLL      Cataract      Colon polyp      Coronary artery disease      Dementia?      DJD (degenerative joint disease) hips      Hearing impaired + tinnitus      High cholesterol      Hyperlipidemia      Hypertension     pt. denies     Hypothyroidism     hypo     Lung cancer      Lung nodule     RLL     Micturition syncope      Osteopenia      Pneumonitis     acute radiation     Pneumothorax      Pneumothorax, postoperative 11/10/2016     Pulmonary fibrosis      Shingles      Shortness of breath      Medications:     Prior to Admission medications    Medication Sig Start Date End Date Taking? Authorizing Provider   dronedarone (MULTAQ) 400 mg tablet Take 1 tablet (400 mg total) by mouth daily. 10/12/15  Yes Agnes Hicks MD   LEVOTHYROXINE SODIUM (LEVOTHYROXINE, BULK, MISC) Take 150 mcg by mouth every morning.    Yes Historical Provider, MD   simvastatin  (ZOCOR) 20 MG tablet Take 20 mg by mouth bedtime.   Yes Historical Provider, MD   warfarin (COUMADIN) 5 MG tablet Please take 5 g on 12/6/2014 and 12/7/2014. 12/6/14 12/6/15 Yes Ilana Jarrett MD   doxycycline (VIBRAMYCIN) 100 MG capsule Take 1 capsule (100 mg total) by mouth 2 (two) times a day. 12/1/15 12/8/15  Chad Pham MD     Social History     Social History     Marital status:      Spouse name: N/A     Number of children: 3     Years of education: N/A     Occupational History     Not on file.     Social History Main Topics     Smoking status: Former Smoker     Packs/day: 3.00     Years: 30.00     Quit date: 12/5/1979     Smokeless tobacco: Never Used     Alcohol use No      Comment: alcoholism in the past, quit drinking 1972      Drug use: No     Sexual activity: Not on file     Other Topics Concern     Not on file     Social History Narrative     Family History   Problem Relation Age of Onset     Diabetes Mother      Heart disease Mother      No Medical Problems Father      Colon cancer Brother      Throat cancer Brother      Review of Systems  A 12 point comprehensive review of systems was negative except as noted.      Objective:     Vitals:    10/17/17 0955   BP: 102/62   Pulse: 83   Resp: 16   SpO2: 98%   Weight: 177 lb 1.6 oz (80.3 kg)   SpO2 at rest on RAis 96%.   SpO2 after ambulating 300ft on RA is 89-90%.    Gen: awake, alert, no distress  HEENT: pink conjunctiva, moist mucosa, Mallampati II/IV  Neck: no thyromegaly, masses or JVD  Lungs:  inspiratory crackles both bases  CV: regular, no murmurs or gallops appreciated  Abdomen: soft, NT, BS wnl  Ext: no edema  Neuro: CN II-XII intact, non focal      Diagnostic tests:     LABS    TETE (+)  RF negative  Smith autoantibodies (+)  Aspergillus fumigatus IgG (+)    BAL cell count Neutrophils 36%, Lymphocytes 8%, Macrophages 50% Eosinophils 6%  BAL cultures negative for fungal/mycobacterial infection.      BRONCHOSCOPY / EBUS -  CYTOLOGY 1/22/2016   A) LYMPH NODE, 4R, ENDOBRONCHIAL ULTRASOUND-GUIDED FINE NEEDLE      ASPIRATION FOR CYTOLOGIC EXAM:       - LYMPHOID TISSUE PRESENT, NEGATIVE FOR TUMOR     B) LYMPH NODE, SUBCARINAL, ENDOBRONCHIAL ULTRASOUND-GUIDED FINE      NEEDLE ASPIRATION FOR CYTOLOGIC EXAM:       - LYMPHOID TISSUE PRESENT, NEGATIVE FOR TUMOR     C) LYMPH NODE, 11R, ENDOBRONCHIAL ULTRASOUND-GUIDED FINE NEEDLE      ASPIRATION FOR CYTOLOGIC EXAM:       - SCANT LYMPHOID TISSUE OBTAINED, NEGATIVE FOR MALIGNANCY     D) LUNG, LOWER RIGHT LOBE, BRONCHIAL ALVEOLAR LAVAGE FOR      CYTOLOGIC EXAM:       1) NEGATIVE FOR MALIGNANT CELLS       2) NUMEROUS ALVEOLAR MACROPHAGES AND OCCASIONAL BRONCHIAL          CELLS ARE PRESENT    CT GUIDED BX - RIGHT LUNG MASS 1/29/16     - RARE ATYPICAL BRONCHIAL CELLS    CT GUIDED BIOPSY of LUNG MASS 4/24/2017  Final Diagnosis   LEFT LUNG MASS, NEEDLE CORE BIOPSY:     -  SQUAMOUS CELL CARCINOMA      CT CHEST WO CONTRAST  12/4/2015 11:32 AM   LUNGS AND PLEURA: Patient has mild, peripheral based interstitial fibrosis, bronchiectasis and minimal areas of honeycombing. This has a peripheral and basilar distribution but with sparing the of subpleural lung. In addition, there is a focal 3 x 3 x 2 cm ovoid subpleural area of more extensive cystic bronchiectasis and peribronchiolar thickening in the right lower lobe that has not changed in the short interval (but progressed since 2010). Similary but smaller focal area of bronchiectasis noted   further superiorly in the right upper lobe and medially in the left lower lobe (coronal images 120-109). No air-fluid levels or an obvious mass/soft tissue component. Calcified granulomas noted in the right lower lobe. No change in the 4 x 2 mm ovoid   nodule medial to the focal bronchiectasis (series 3 image 89). There are two nodules in the left upper lobe/lingula, largest measuring 4-5mm and subpleural in location (image60).Tiny groundglass nodule in the left lower  lobe is also noted (series 3   image 80).   MEDIASTINUM: Multiple small mediastinal nodes are noted measuring a centimeter in short axis. There are coronary artery calcifications.   LIMITED UPPER ABDOMEN: Unremarkable.   MUSCULOSKELETAL: No suspicious lesions on bone windows.   IMPRESSION:   1. Patient has interstitial lung disease with a distribution suggestive of NSIP. In addition there are more focal areas of bronchiectasis and peribronchiolar thickening as noted above, largest in the right lower lobe. No air-fluid levels, mucus plugging or obvious soft tissue mass. While this may be inflammatory/infectious in nature, these patients are at higher risk for malignancies as well. Suggest pulmonary consultation for further evaluation.   2. Few pulmonary nodules as noted above.   3. Mildly prominent mediastinal nodes are likely reactive.   4. Coronary artery calcifications.    PET FDG/CT 1/15/2016 8:47 AM   INDICATION: Pulmonary nodule   COMPARISON: CTs from 12/04/2015, 11/30/2015, and 11/29/2010   FINDINGS:   HEAD AND NECK: Marked generalized cerebral and cerebellar volume loss, likely age-related.   CHEST: 3.5 x 1.7 cm partially cavitary right lower lobe pulmonary mass is markedly FDG avid (SUV max 9.2) and has enlarged slowly since 11/29/2010, when it was a 0.8 x 0.8 cm groundglass opacity.   Interlobular pulmonary septal thickening, subpleural bands, and traction bronchiectasis with a peripheral predominance and associated inflammatory FDG activity. Uniform, moderately FDG avid, nonenlarged, noncalcified middle mediastinal and bilateral   hilar lymph nodes.   1.1 cm partially cavitary opacity in the periphery of the superior segment of the left lower lobe is moderately FDG avid (SUVmax 6.5), not significantly changed anatomically from 12/04/2015, but outside of the imaged volume on the older scans. Calcified   atherosclerosis, including the coronary arteries.   ABDOMEN/PELVIS: No abnormal FDG activity. Negative  adrenals. Moderate calcified atherosclerosis.   MUSCULOSKELETAL: Bilateral total hip arthroplasties. Moderate degenerative changes cervical and lumbar spine.   IMPRESSION:   CONCLUSION:   1. 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 Reactive middle mediastinal and bilateral hilar lymphadenopathy.   2. 3.5 cm cavitary right lower lobe pulmonary mass has been enlarging slowly since 2010 and is suspicious for low-grade pulmonary adenocarcinoma. No definite evidence of metastases, but reactive FDG activity in right hilar and mediastinal lymph nodes   from interstitial lung disease could obscure underlying tumor metabolism.   3. 1.1 cm partially cavitary opacity in the superior segment of the left lower lobe is indeterminate. Its appearance and FDG activity are similar the suspected tumor in the contralateral right lung and a synchronous tumor is favored, but it could also   be a focal area of inflammation from the interstitial lung disease.    CT CHEST WO CONTRAST 8/15/2016 7:56 AM  COMPARISON: Chest CT dated 12/4/2015  LUNGS AND PLEURA: Central airways are patent. Mild bronchiectasis is again noted. No bronchial wall thickening. Basilar predominant reticular opacities with architectural distortion and traction bronchiectasis and bronchiolectasis have not significantly   changed. A subtle background of groundglass has minimally increased. A cavitary mass in the peripheral right lower lobe has increased in size, measuring 4.4 cm x 2.3 cm (previously 4.1 cm x 1.7 cm; series 3 image 90). A 2.9 cm x 1.0 cm opacity in the   peripheral posterior right upper lobe previously measured 1.4 cm x 0.6 cm (image 53). A peripheral opacity in the medial superior segment of the left lower lobe measuring 1.5 cm x 0.8 cm previously measured 1.1 cm x 0.9 cm (image 68).  MEDIASTINUM: Atherosclerotic disease including significant LAD coronary artery disease. Scattered  additional coronary calcifications noted. Mediastinal lymphadenopathy has increased. A 1.5 cm right lower paratracheal lymph node previously measured 1.2 cm. A 1.2 cm subcarinal lymph node previously measured 1.0 cm. There is a small amount of pericardial fluid.  LIMITED UPPER ABDOMEN: Negative.  MUSCULOSKELETAL: Osteopenia and multilevel vertebral body degenerative change. There is grade 1 anterolisthesis of C7 on T1.  CONCLUSION:  1. Increasing size of the right lower lobe cavitary mass, favoring low-grade neoplasm. Additional peripheral opacities have increased in size as well, which may represent progression of fibrosis or slow-growing neoplasms.  2. Increased mediastinal lymphadenopathy.  3. Pulmonary fibrosis in a NSIP pattern. Increased groundglass represents an active/cellular component.  4. Atherosclerotic disease living coronary artery disease.  5. Osteopenia and degenerative change.    PET CT scan 11/7/2016  1. Right upper lobe wedge resection has been performed.  2. Right lower lobe pulmonary mass has enlarged and increased in FDG activity. Fiducial markers have been placed in it.  3. Chronic interstitial lung disease has progressed slightly.  4. 1.1 cm opacity in the periphery of the superior segment of the left lower lobe has decreased in FDG activity and could be a focus of inflammation as a part of the interstitial lung disease, but a tumor at this site is not excluded.    Chest CT scan 11/12/2016  1. No evidence of pulmonary emboli.  2. Compared to the PET/CT done 5 days ago, there has been little change in the appearance of either chest. Specifically, patient's had wedge resection of the right upper lobe pleural-based opacity. Focal pneumatocele or loculated pneumothorax noted adjacent to the staple line with a complex, very small hydropneumothorax again noted in the right chest. Tiny subcutaneous emphysema with air tracking into the chest wall anteriorly at rib 4 is again seen likely site of chest  tube.   3. Patient has underlying UIP but has developed focal interstitial and groundglass opacities in the left upper lobe and left lower lobe though unchanged since the PET CT, this is new since the August study and findings suggest an acute component of his interstitial disease. The lack of symmetry suggest this is not simply superimposed pulmonary edema. Suggest pulmonary consultation.  4. Cavitary pleural-based mass in the right lower lobe fiducial markers mild mediastinal adenopathy is unchanged.    CTA CHEST PE RUN 12/11/2016 1:38 AM  INDICATION: Shortness of breath.  COMPARISON: Chest CT 08/15/2016.  ANGIOGRAM CHEST: No pulmonary emboli.  LUNGS AND PLEURA: Interstitial lung disease is present with subpleural reticular nodular densities and scattered groundglass opacities. These findings have progressed since the prior exam. Stable bronchiectasis. 3.7 x 2.9 cm lobulated and partially cavitary mass in the right lower lobe has increased in size. Fiduciary markers are now present. 9 mm nodule in the anterior right lower lobe (image 242) has increased in size, interval right upper lobe wedge resection with loculated pleural air, 5 mm   lingular nodule increased in size. Tiny right pleural effusion.  MEDIASTINUM: Mediastinal and right hilar lymphadenopathy is slightly decreased.  LIMITED UPPER ABDOMEN: Negative.  MUSCULOSKELETAL: Negative.  IMPRESSION:   1. No pulmonary emboli.  2. Partially cavitated malignant mass in the right lower lobe is mildly increased in size measuring 3.7 x 2.9 cm. Fiducial markers are now present within the mass. Progression of interstitial lung disease. New postsurgical changes right upper lobe with loculated pleural air in this location. There is a new tiny right pleural effusion.  3. 9 mm nodule anterior right lower lobe is increased in size as has a 5 mm nodule in the lingula.  4. Mediastinal lymphadenopathy is decreased    CTA CHEST PE RUN 3/12/2017 10:42 AM   INDICATION: Shortness  of breath, history of lung cancer  COMPARISON: Chest CT dated 3/6/2017.  ANGIOGRAM CHEST: Negative for pulmonary emboli. Negative for thoracic aortic dissection. There is mild enlargement of the proximal descending thoracic aorta, which is unchanged. There is atherosclerotic disease including coronary artery calcification.   Aortic tortuosity is noted.  LUNGS AND PLEURA: No significant change from the prior study. Extensive groundglass, reticular, and confluent opacities throughout the lungs. Nodule in the peripheral left upper lobe. Mass along the posterior right hemithorax. No pneumothorax.  MEDIASTINUM: The heart is mildly enlarged. Mediastinal and right hilar lymphadenopathy persists.  LIMITED UPPER ABDOMEN: Normal adrenal glands.  MUSCULOSKELETAL: Osteopenia.  CONCLUSION:  1. No PE.  2. Otherwise no change from the prior study.    CTA CHEST PE RUN  4/25/2017 11:54 AM  INDICATION: dyspnea  TECHNIQUE: Helical acquisition through the chest was performed during the arterial phase of contrast enhancement using IV contrast. 2D and 3D reconstructions were performed by the CT technologist. Dose reduction techniques were used.   IV CONTRAST: Iohexol (Omni) 100 mL  COMPARISON: 3/12/2017  FINDINGS:  ANGIOGRAM CHEST: Negative for pulmonary emboli. Negative for thoracic aortic dissection and aneurysms.  LUNGS AND PLEURA: Marked fibrosis with varicoid bronchiectasis throughout the right lung, and associated parenchymal opacities, mildly progressed when compared to previous. A small right pleural effusion is also larger. Postop changes in the right lung and right sided fiducial markers again seen. There is a new fiducial marker adjacent to the lingular nodule that was recently biopsied. This nodule measures 1.5 cm. Mild peripheral fibrosis in the left lung, similar to previous. No pneumothorax.  MEDIASTINUM: There are several mildly prominent mediastinal lymph nodes, unchanged from previous. Significant coronary artery  calcification.  LIMITED UPPER ABDOMEN: Negative.   MUSCULOSKELETAL: Negative.  CONCLUSION:  1.  No pulmonary embolus.  2.  Stable lingular nodule, now with adjacent fiducial marker.  3.  Increasing pulmonary opacities throughout the right lung, superimposed on chronic fibrosis. Findings may represent progressive fibrosis or superimposed infiltrate.    CT CHEST W CONTRAST 8/9/2017 9:18 AM  INDICATION: Stage IIIa multifocal squamous carcinoma right lung diagnosed November 20, 2016.  COMPARISON: 4/25/2017  LUNGS AND PLEURA: Patient's developed a tiny hydropneumothorax on the right. Trace right-sided effusion has decreased in size. No appreciable change in the fibrosis and the varicose bronchiectasis in the right upper and lower lobes. However, decrease in the superimposed airspace opacities throughout the right lung. Fiducial markers are again noted. Resolution of the pleural-based nodule in the lingula adjacent to the fiducial marker. Nothing measurable remains. Mild subpleural fibrosis noted throughout the left lung, unchanged.   MEDIASTINUM: Mildly prominent mediastinal nodes are unchanged, none over a centimeter in size. No central pulmonary emboli. Volume loss of slight shift of the mediastinum into the right chest is unchanged  LIMITED UPPER ABDOMEN: Unremarkable.  MUSCULOSKELETAL: No suspicious lesions.  CONCLUSION:  1.  Patient's developed a tiny right apical pneumothorax. Decrease in the very small right-sided pleural effusion. Also decrease in airspace opacities throughout the right chest.  2.  Resolution of the pleural-based lingular nodule.  3.  No change in the extensive fibrosis and varicoid bronchiectasis in the right lung and mild subpleural fibrosis scattered throughout the left lung.    PFTs (12/23/2015)  FEV1/FVC is 85% and is normal.   FEV1 is 3.26L (92%) predicted and is normal.   FVC is 3.83L (92%) predicted and normal.   There was no improvement in spirometry after a single inhaled dose of  bronchodilator.   TLC is 5.27L (70%) predicted and is reduced.   RV is 11.41L (47%) predicted and is reduced.   DLCO is 15.62ml/min/hg (63%) predicted and is reduced when it is corrected for hemoglobin.   Flow volume loops indicate no abnormalities.    PFTs (11/4/2016)  FEV1/FVC is 87 and is normal.  FEV1 is 2.60 L (89%) predicted and is normal.  FVC is 2.98 L (75%) predicted and normal.  There was no improvement in spirometry after a single inhaled dose of bronchodilator.  TLC is 4.82 L (66%) predicted and is reduced.  RV is 1.79 L (61%) predicted and is reduced.  DLCO is 54% predicted and is reduced when it is corrected for hemoglobin.

## 2021-06-14 NOTE — PROGRESS NOTES
[facility] Note  Patient Name: Kartik Tam  Patient Age: 82 y.o.  YOB: 1935  MRN: [id number]  ?  Date of Visit: 12/21/2017  Reason for Office Visit:   Chief Complaint   Patient presents with     Hospital Visit Follow Up     Chest pain/shingles      Dizziness       HPI: Kartik Tam 82 y.o. male with a history of pulmonary fibrosis, coronary artery disease w stents, CKD III, h/o bladder cancer, who presents to clinic for hospital follow up. He was hospitalized from 12/15 - 12/17 for chest pain found to have shingles. He was started on acyclovir and gabapentin for pain. The pain is improving, and his wife who is here with him today has not noticed any new lesions. They look to be crusting over. He was scheduled for cystoscopy with biopsy on 12/18 however he was advised to postpone this procedure. He is now rescheduled in early Jan. He also had his metoprolol increased while in hospital due to limited episodes of RVR mainly with exertion. He was evaluated by cards while inpatient.     Review of Systems: As noted in HPI     Current Scheduled Meds:  Outpatient Encounter Prescriptions as of 12/21/2017   Medication Sig Dispense Refill     acyclovir (ZOVIRAX) 800 MG tablet Take 1 tablet (800 mg total) by mouth 5 (five) times a day for 9 days. 45 tablet 0     aspirin 81 MG EC tablet Take 81 mg by mouth daily.       atorvastatin (LIPITOR) 20 MG tablet Take 1 tablet (20 mg total) by mouth at bedtime. 90 tablet 3     digoxin (LANOXIN) 125 mcg tablet Take 1 tablet (125 mcg total) by mouth every morning. 90 tablet 3     furosemide (LASIX) 40 MG tablet Take 1 tablet (40 mg total) by mouth daily. 90 tablet      gabapentin (NEURONTIN) 100 MG capsule Take 100 mg by mouth 3 (three) times a day. 90 capsule 0     levothyroxine (SYNTHROID, LEVOTHROID) 150 MCG tablet Take 150 mcg by mouth daily.       metoprolol succinate (TOPROL-XL) 25 MG Take 1 tablet (25 mg total) by mouth 2 (two) times a day. 60  "tablet 0     omeprazole (PRILOSEC) 20 MG capsule Take 1 capsule (20 mg total) by mouth daily. 90 capsule 3     predniSONE (DELTASONE) 20 MG tablet Take 30 mg by mouth daily.        sulfamethoxazole-trimethoprim (BACTRIM) 400-80 mg per tablet Take 1 tablet by mouth daily. 30 tablet 3     warfarin (COUMADIN) 2.5 MG tablet Take 2.5mg (1 tab) on Tue Thur Sat, and 3.75mg (1 and 1/2 tabs) on all other days.  OR AS DIRECTED.  Adjust dose based on INR. 120 tablet 1     No facility-administered encounter medications on file as of 12/21/2017.        Objective / Physical Examination:  /64  Pulse 76  Ht 5' 11\" (1.803 m)  Wt 172 lb (78 kg)  BMI 23.99 kg/m2  Wt Readings from Last 3 Encounters:   12/21/17 172 lb (78 kg)   12/19/17 168 lb 1.6 oz (76.2 kg)   12/17/17 165 lb 11.2 oz (75.2 kg)     Body mass index is 23.99 kg/(m^2). (>25?)    General appearance: alert, appears stated age and cooperative  Neck: no adenopathy, no JVD  Lungs: clear to auscultation bilaterally no rhonchi.  Heart: irregularly irregular rhythm  Abdomen: soft, non-tender; bowel sounds normal; no masses,  no organomegaly  Extremities: extremities normal, atraumatic, no cyanosis or edema  Pulses: 2+ and symmetric  Skin: Chest: dermatomal rash over R chest approx T5 with crusting noted.    Neurologic: Grossly normal    Assessment / Plan / Medical Decision Making:      Encounter Diagnoses   Name Primary?     Hospital discharge follow-up Yes     Shingles      CIS (carcinoma in situ of bladder)      Chronic atrial fibrillation      CKD (chronic kidney disease), stage III         Hospital discharge follow-up  Follow up for 2 day hospitalization, acute shingles. Lesions crusting over     Shingles  Continue to finish course of acyclovir. May use gabapentin if helping at current dose.     CIS (carcinoma in situ of bladder)  Upcoming cysto rescheduled. Ok to proceed    Chronic atrial fibrillation  Rate controlled. On warfarin for anticoagualtion. Resume " warfarin and stop 5 days before rescheduled cysto   continue metoprolol succinate to 25mg BID   cardiology advised keeping admission dose of digoxin and not increasing.  Advised continuing with metoprolol for now but considering changing to diltiazem in future pending respiratory symptoms.    CKD (chronic kidney disease), stage III  stable    CHF chronic, not clearly in exacerbation  NASH persists.  No worsening of edema  continue baseline dose of furosemide      HTN  adequate control    Follow up with PCP in 3 months or sooner if needed. Scheduled for cysto, follow up with cards. Return sooner if symptoms worsen.     Total time spent with patient was 30 minutes with >50% of time spent in face-to-face counseling regarding the above plan     Ignacio Griffin MD  [facility]

## 2021-06-14 NOTE — PROGRESS NOTES
[facility] Progress Note    Patient: Kartik Tam  MRN: [id number]  Date of Service:  December 19, 2017      Assessment and Plan:    Squamous cell carcinoma of right lung    Staging form: Lung, AJCC 7th Edition      Clinical: No stage assigned - Unsigned      Pathologic stage from 11/7/2016: Stage IIIA (T4, N0, cM0) - Signed by John Andre MD on 11/7/2016    1.  Squamous cell carcinoma of the right lung: He had surveillance imaging done a week or so ago.  Reviewed the scans.  Some increasing density around the lowest fiducial markers on the right lung.  Also has some slight enlargement of 2 mediastinal nodes when compared to 4 months ago.  He was recently put back on higher dose steroids for shortness of breath by Dr. Tarango.    Given the slightly enlarged nodes in the mediastinum and infiltrate in the right lung base, left return with repeat imaging in 2 months.  If things continue to resolve then we will get a PET scan.  I told him that ultimately we would need to do an endobronchial ultrasound with biopsy of the nodes if they are enlarging to see if he has progressive disease.    2.  Increasing dyspnea: Recently had a cardiology evaluation.  Things appeared stable.  He was restarted on higher dose prednisone by pulmonary.  His breathing is now better.  He has no complaints today.      3.  Pneumothorax: Incidental finding on his CT.  It is very small.  Asymptomatic.  Stable.  Monitor for now.     4.  Zoster: Currently on acyclovir.  Also started on gabapentin.  Will titrate the gabapentin off when he is done with acyclovir.  He is not having any pain.    ECOG Performance   ECOG Performance Status: 2    Distress Assessment  Distress Assessment Score: 2    Pain  Currently in Pain: No/denies    Diagnosis:    1.  Squamous cell carcinoma of the right lung: Multifocal.  Diagnosed in September 2016.  Right upper lobe and right lower lobe.   2.  Squamous cell carcinoma of the lingula.   Diagnosed April 2017.    Treatment:    1.  Right upper lobe wedge resection.  Right lower lobe biopsy.  September 2016.  2.  CyberKnife to the right upper lobe area and also the right lower lobe.  Completed January 9, 2017.  3.  CyberKnife to peripheral lingular nodule.  Treatment dates May 19 - May 31, 2017.  5000 cGy delivered.    Interim History:    Gunnar is here today for a follow-up visit.  Recently he was admitted to the hospital for a few days for shortness of breath and chest pain.  The chest pain turned out to be zoster.  Shortness of breath was treated with higher dose steroids.  This is feeling better today.  Still taking his acyclovir.  No cough.  No fevers or chills.  No lower extremity edema.    Review of Systems:    Constitutional  Constitutional (WDL): Exceptions to WDL  Fatigue: Fatigue not relieved by rest - Limiting instrumental ADL  Weight Loss: to <10% from baseline, intervention not indicated (down 5lbs since 8/10/17)  Neurosensory  Neurosensory (WDL): Exceptions to WDL  Ataxia: Asymptomatic, clinical or diagnostic observations only, intervention not indicated (using a cane)  Peripheral Sensory Neuropathy: Asymptomatic, loss of deep tendon reflexes or paresthesia (shingles - on antibiotics and gabapentin)  Cardiovascular  Cardiovascular (WDL): All cardiovascular elements are within defined limits  Pulmonary  Respiratory (WDL): Exceptions to WDL  Dyspnea: Shortness of breath with minimal exertion, limiting instrumental ADL  Gastrointestinal  Gastrointestinal (WDL): Exceptions to WDL  Anorexia: Loss of appetite without alteration in eating habits  Genitourinary  Genitourinary (WDL): All genitourinary elements are within defined limits  Integumentary  Integumentary (WDL): All integumentary elements are within defined limits  Patient Coping  Patient Coping: Accepting  Accompanied by  Accompanied by: Family Member (wife and son)    Past History:    Past Medical History:   Diagnosis Date     A-fib  12/05/2014     Bladder cancer      Bronchiectasis RLL      Cataract      Colon polyp      Coronary artery disease      Dementia?      DJD (degenerative joint disease) hips      Hearing impaired + tinnitus      High cholesterol      Hyperlipidemia      Hypertension     pt. denies     Hypothyroidism     hypo     Lung cancer      Lung nodule     RLL     Micturition syncope      Osteopenia      Pneumonitis     acute radiation     Pneumothorax      Pneumothorax, postoperative 11/10/2016     Pulmonary fibrosis      Shingles      Shortness of breath      Physical Exam:    Recent Vitals 12/19/2017   Height -   Weight 168 lbs 2 oz   BSA (m2) -   /56   Pulse 72   Temp 98.4   Temp src 1   SpO2 99   Some recent data might be hidden     General: patient appears stated age of 82 y.o.. Nontoxic and in no distress.   HEENT: Head: atraumatic, normocephalic. Sclerae anicteric.  Chest:  Normal respiratory effort.  Clear to auscultation bilaterally.  No significant wheezing.  Cardiac:  No edema.  Regular rate and rhythm.  Occasional premature beat.  Abdomen: abdomen is non-distended  Extremities: normal tone and muscle bulk.  Skin: no lesions or rash. Warm and dry.   CNS: alert and oriented x3. Grossly non-focal.   Psychiatric: normal mood and affect.     Lab Results:    Recent Results (from the past 168 hour(s))   Comprehensive Metabolic Panel   Result Value Ref Range    Sodium 139 136 - 145 mmol/L    Potassium 4.9 3.5 - 5.0 mmol/L    Chloride 100 98 - 107 mmol/L    CO2 22 22 - 31 mmol/L    Anion Gap, Calculation 17 5 - 18 mmol/L    Glucose 116 70 - 125 mg/dL    BUN 37 (H) 8 - 28 mg/dL    Creatinine 1.72 (H) 0.70 - 1.30 mg/dL    GFR MDRD Af Amer 46 (L) >60 mL/min/1.73m2    GFR MDRD Non Af Amer 38 (L) >60 mL/min/1.73m2    Bilirubin, Total 1.1 (H) 0.0 - 1.0 mg/dL    Calcium 9.0 8.5 - 10.5 mg/dL    Protein, Total 7.3 6.0 - 8.0 g/dL    Albumin 3.4 (L) 3.5 - 5.0 g/dL    Alkaline Phosphatase 63 45 - 120 U/L    AST 36 0 - 40 U/L    ALT  29 0 - 45 U/L   HM1 (CBC with Diff)   Result Value Ref Range    WBC 7.7 4.0 - 11.0 thou/uL    RBC 4.72 4.40 - 6.20 mill/uL    Hemoglobin 15.0 14.0 - 18.0 g/dL    Hematocrit 45.7 40.0 - 54.0 %    MCV 97 80 - 100 fL    MCH 31.8 27.0 - 34.0 pg    MCHC 32.8 32.0 - 36.0 g/dL    RDW 14.8 (H) 11.0 - 14.5 %    Platelets 313 140 - 440 thou/uL    MPV 9.0 8.5 - 12.5 fL    Neutrophils % 88 (H) 50 - 70 %    Lymphocytes % 8 (L) 20 - 40 %    Monocytes % 3 2 - 10 %    Eosinophils % 0 0 - 6 %    Basophils % 0 0 - 2 %    Neutrophils Absolute 6.7 2.0 - 7.7 thou/uL    Lymphocytes Absolute 0.6 (L) 0.8 - 4.4 thou/uL    Monocytes Absolute 0.2 0.0 - 0.9 thou/uL    Eosinophils Absolute 0.0 0.0 - 0.4 thou/uL    Basophils Absolute 0.0 0.0 - 0.2 thou/uL   ECG 12 lead nursing unit performed   Result Value Ref Range    SYSTOLIC BLOOD PRESSURE  mmHg    DIASTOLIC BLOOD PRESSURE  mmHg    VENTRICULAR RATE 96 BPM    ATRIAL RATE 96 BPM    P-R INTERVAL 178 ms    QRS DURATION 82 ms    Q-T INTERVAL 314 ms    QTC CALCULATION (BEZET) 396 ms    P Axis 58 degrees    R AXIS -4 degrees    T AXIS 168 degrees    MUSE DIAGNOSIS       Atrial fibrillation  ST & T wave abnormality, consider anterolateral ischemia  Abnormal ECG  Compared to prior  No significant change was found  Confirmed by NAV JOHNSON MD LOC: ([id number]) on 12/15/2017 4:28:21 PM     HM2(CBC w/o Differential)   Result Value Ref Range    WBC 9.8 4.0 - 11.0 thou/uL    RBC 4.99 4.40 - 6.20 mill/uL    Hemoglobin 15.8 14.0 - 18.0 g/dL    Hematocrit 47.7 40.0 - 54.0 %    MCV 96 80 - 100 fL    MCH 31.7 27.0 - 34.0 pg    MCHC 33.1 32.0 - 36.0 g/dL    RDW 14.8 (H) 11.0 - 14.5 %    Platelets 294 140 - 440 thou/uL    MPV 8.8 8.5 - 12.5 fL   Basic Metabolic Panel   Result Value Ref Range    Sodium 139 136 - 145 mmol/L    Potassium 3.6 3.5 - 5.0 mmol/L    Chloride 102 98 - 107 mmol/L    CO2 25 22 - 31 mmol/L    Anion Gap, Calculation 12 5 - 18 mmol/L    Glucose 85 70 - 125 mg/dL    Calcium 9.4 8.5 - 10.5  mg/dL    BUN 36 (H) 8 - 28 mg/dL    Creatinine 1.63 (H) 0.70 - 1.30 mg/dL    GFR MDRD Af Amer 49 (L) >60 mL/min/1.73m2    GFR MDRD Non Af Amer 41 (L) >60 mL/min/1.73m2   BNP(B-type Natriuretic Peptide)   Result Value Ref Range     (H) 0 - 91 pg/mL   Troponin I   Result Value Ref Range    Troponin I 0.03 0.00 - 0.29 ng/mL   INR   Result Value Ref Range    INR 1.70 (H) 0.90 - 1.10   Troponin I   Result Value Ref Range    Troponin I 0.01 0.00 - 0.29 ng/mL   Thyroid Stimulating Hormone (TSH)   Result Value Ref Range    TSH 0.20 (L) 0.30 - 5.00 uIU/mL   Troponin I   Result Value Ref Range    Troponin I 0.02 0.00 - 0.29 ng/mL   Basic Metabolic Panel   Result Value Ref Range    Sodium 139 136 - 145 mmol/L    Potassium 3.9 3.5 - 5.0 mmol/L    Chloride 104 98 - 107 mmol/L    CO2 26 22 - 31 mmol/L    Anion Gap, Calculation 9 5 - 18 mmol/L    Glucose 83 70 - 125 mg/dL    Calcium 8.4 (L) 8.5 - 10.5 mg/dL    BUN 38 (H) 8 - 28 mg/dL    Creatinine 1.41 (H) 0.70 - 1.30 mg/dL    GFR MDRD Af Amer 58 (L) >60 mL/min/1.73m2    GFR MDRD Non Af Amer 48 (L) >60 mL/min/1.73m2   HM2(CBC w/o Differential)   Result Value Ref Range    WBC 8.9 4.0 - 11.0 thou/uL    RBC 4.49 4.40 - 6.20 mill/uL    Hemoglobin 14.2 14.0 - 18.0 g/dL    Hematocrit 42.0 40.0 - 54.0 %    MCV 94 80 - 100 fL    MCH 31.6 27.0 - 34.0 pg    MCHC 33.8 32.0 - 36.0 g/dL    RDW 14.8 (H) 11.0 - 14.5 %    Platelets 226 140 - 440 thou/uL    MPV 8.4 (L) 8.5 - 12.5 fL   Digoxin (Lanoxin )   Result Value Ref Range    Digoxin 0.9 0.5 - 2.0 ng/mL   T4, Free   Result Value Ref Range    Free T4 1.1 0.7 - 1.8 ng/dL   INR   Result Value Ref Range    INR 1.23 (H) 0.90 - 1.10   Basic Metabolic Panel   Result Value Ref Range    Sodium 140 136 - 145 mmol/L    Potassium 4.6 3.5 - 5.0 mmol/L    Chloride 102 98 - 107 mmol/L    CO2 29 22 - 31 mmol/L    Anion Gap, Calculation 9 5 - 18 mmol/L    Glucose 92 70 - 125 mg/dL    Calcium 9.1 8.5 - 10.5 mg/dL    BUN 36 (H) 8 - 28 mg/dL    Creatinine  1.43 (H) 0.70 - 1.30 mg/dL    GFR MDRD Af Amer 57 (L) >60 mL/min/1.73m2    GFR MDRD Non Af Amer 47 (L) >60 mL/min/1.73m2   HM2(CBC w/o Differential)   Result Value Ref Range    WBC 11.6 (H) 4.0 - 11.0 thou/uL    RBC 4.74 4.40 - 6.20 mill/uL    Hemoglobin 14.9 14.0 - 18.0 g/dL    Hematocrit 44.4 40.0 - 54.0 %    MCV 94 80 - 100 fL    MCH 31.4 27.0 - 34.0 pg    MCHC 33.6 32.0 - 36.0 g/dL    RDW 14.6 (H) 11.0 - 14.5 %    Platelets 238 140 - 440 thou/uL    MPV 8.7 8.5 - 12.5 fL   C. Diff Toxin By PCR   Result Value Ref Range    C.Difficile Toxigenic by PCR Negative Negative   EnteroHaemorrhagic E. coli Work Up   Result Value Ref Range    Shiga Toxin 1 Negative Negative    Shiga Toxin 2 Negative Negative     Imaging:    CT scan images were personally reviewed.  Slightly enlarged mediastinal nodes ×2.  Some increased density around the lowest fiducial marker on the right lung.    CT CHEST, ABDOMEN, AND PELVIS  12/6/2017 9:04 AM  COMPARISON: 8/9/2017 and 4/25/2017 CT. 4/3/2017 PET/CT.     FINDINGS:   CHEST: Stable position pole right and single left lung fiducial markers. Stable diffuse right lung infiltrates with traction bronchiectasis consistent with scar. Decreased right pleural effusion. Incomplete right lung expansion consistent with trapped   lung.     Increased nonmass-like consolidation around the inferior left fiducial marker. This could represent scar, pneumonia, or less likely neoplasm. No left effusion.     Enlarged mediastinal adenopathy since April 2017. The right paratracheal node measures 1.9 x 1.6 cm (series 4, image 90) compared to 1.5 x 1.4 cm. The aortopulmonary window node measures 2 x 1 cm (image 90) compared to 1.1 x 0.7 cm previously. Coronary   atherosclerosis.      ABDOMEN: Normal-appearing adrenals, liver, spleen, kidneys, gallbladder, and pancreas. No adenopathy. Aortoiliac atherosclerosis. The mesenteric vessels appear patent.     PELVIS: Streak artifact. Bowel appears  normal.     MUSCULOSKELETAL: Bilateral total hip prostheses. Spinal degenerative change. No metastases seen.    CONCLUSION:  1.  Bilateral fiducial markers for lung cancer treatment.  2.  Increased lung density around the left inferior lung fiducial which is most likely posttreatment scarring.  3.  Increased mediastinal adenopathy.  4.  Extensive right lung scarring with traction bronchiectasis.  5.  Right trapped lung.  6.  Coronary atherosclerosis.      Signed by: John Andre MD

## 2021-06-14 NOTE — PROGRESS NOTES
Assessment/Plan:     1. Heart failure with preserved ejection fraction, NYHA class II: Kartik Tam appears well compensated.  No signs of fluid retention.  He continues to have dyspnea on exertion which has not worsened.  His shortness of breath has improved significantly with the increase of prednisone.  He continues Lasix 40 mg daily.  His weights have been stable and he continues to follow a low-sodium diet.  No changes to medications.    2. Hypertension: Well controlled.  Blood pressure 122/70 with a heart rate of 84.  He continues metoprolol succinate 12.5 mg twice a day.    3. Persistent atrial fibrillation: Rate controlled.  He continues metoprolol succinate 12.5 mg twice a day and digoxin 125 mcg daily.  He is on warfarin for anticoagulation.  His INR is managed by his primary doctor.  His last INR was 2.9.    Follow-up with Dr. Hicks in August and in the heart failure clinic in 2-3 months or sooner if needed    Subjective:     Kartik Tam is seen at [facility] failure clinic today for continued follow-up.  His wife accompanies him today.  He follows up for heart failure with preserved ejection fraction.  His most recent echocardiogram was done December 2016 which showed an ejection fraction of 58%.  He had a nuclear stress test in April 2017 which was negative for inducible myocardial ischemia or infarction and showed an ejection fraction of 70%.  He has a past medical history significant for hypertension, coronary artery disease, hyperlipidemia, persistent atrial fibrillation, and lung cancer.    During the last clinic visit, I increased his Lasix to 40 mg twice a day due to increased shortness of breath with activity and at rest.  He also talked to pulmonology and his prednisone was increased to 20 mg daily.  He states that his breathing improved somewhat with increasing his prednisone.  His prednisone was further increased to 40 mg daily and he started Bactrim also.  He sees his  pulmonologist on Tuesday, December 12.  He states his shortness of breath has improved significantly.  He denies any orthopnea or PND.  He denies lightheadedness, shortness of breath, orthopnea, PND, palpitations, chest pain, abdominal fullness/bloating and lower extremity edema.      He is monitoring home weights which are stable between 168-170 pounds.  He is following a low sodium diet.     Review of Systems:   General: WNL  Eyes: WNL  Ears/Nose/Throat: WNL  Lungs: Shortness of Breath  Heart: WNL  Stomach: WNL  Bladder: WNL  Muscle/Joints: WNL  Skin: WNL  Nervous System: WNL  Mental Health: WNL     Blood: WNL     Patient Active Problem List   Diagnosis     Persistent atrial fibrillation     HTN (hypertension)     Bronchiectasis without complication     CIS (carcinoma in situ of bladder), dx 2010.      Mass of upper lobe of left lung     Interstitial lung disease     HLD (hyperlipidemia)     Squamous cell carcinoma of right lung     Coronary artery disease involving native coronary artery of native heart without angina pectoris     Pleural effusion     Hypothyroidism, unspecified type     Pulmonary fibrosis     Chronic anticoagulation, [id number] score 3     Epididymal cyst     Acute radiation pneumonitis     Heart failure with preserved ejection fraction     Dyspnea on exertion     Adjustment disorder with depressed mood       Past Medical History:   Diagnosis Date     A-fib 12/05/2014     Bladder cancer      Bronchiectasis RLL      Cataract      Colon polyp      Coronary artery disease      Dementia?      DJD (degenerative joint disease) hips      Hearing impaired + tinnitus      High cholesterol      Hyperlipidemia      Hypertension     pt. denies     Hypothyroidism     hypo     Lung cancer      Lung nodule     RLL     Micturition syncope      Osteopenia      Pneumonitis     acute radiation     Pneumothorax      Pneumothorax, postoperative 11/10/2016     Pulmonary fibrosis      Shingles      Shortness of breath         Past Surgical History:   Procedure Laterality Date     APPENDECTOMY       Attempted lung  biopsy  04/19/2017     BLADDER SURGERY  2010     CARDIAC CATHETERIZATION  08/02/2016    stent x 1     CARDIAC ELECTROPHYSIOLOGY STUDY AND ABLATION       COLONOSCOPY       CORONARY STENT PLACEMENT       JOINT REPLACEMENT Bilateral     Hips     LUNG CANCER SURGERY  12/2016 and 01/2017    Cyber Knife-9 treatments     MA CYSTOURETHROSCOPY,FULGUR <0.5 CM LESN N/A 1/25/2016    Procedure: CYSTOSCOPY BLADDER BIOPSIES TRANSURETHRAL RESECTION BLADDER TUMOR;  Surgeon: Antoine Rodriguez MD;  Location: [facility];  Service: Urology     THORACOSCOPY Right 9/26/2016    Procedure:   RIGHT THORACOSCOPY/ RIGHT UPPER LOBE AND RIGHT LOWER LOBE WITH NEEDLE WIRE LOCALIZATION MULTIPLE WEDGE RESECTION NODE SAMPLING FIDUCIALS PLACEMENT;  Surgeon: Brandon Pavon MD;  Location: [facility];  Service:      THORACOSCOPY Right 9/26/2016    Procedure: THORACOSCOPY FOR CONTROL POST OPERATIVE BLEED;  Surgeon: Brandon Pavon MD;  Location: [facility];  Service:        Family History   Problem Relation Age of Onset     Diabetes Mother      Heart disease Mother      No Medical Problems Father      Colon cancer Brother      Throat cancer Brother        Social History     Social History     Marital status:      Spouse name: N/A     Number of children: 3     Years of education: N/A     Occupational History     Not on file.     Social History Main Topics     Smoking status: Former Smoker     Packs/day: 3.00     Years: 30.00     Quit date: 12/5/1979     Smokeless tobacco: Never Used     Alcohol use No      Comment: alcoholism in the past, quit drinking 1972      Drug use: No     Sexual activity: Not on file     Other Topics Concern     Not on file     Social History Narrative       Current Outpatient Prescriptions   Medication Sig Dispense Refill     aspirin 81 MG EC tablet Take 81 mg by mouth daily.       atorvastatin  (LIPITOR) 20 MG tablet Take 1 tablet (20 mg total) by mouth at bedtime. 90 tablet 3     digoxin (LANOXIN) 125 mcg tablet Take 1 tablet (125 mcg total) by mouth every morning. 90 tablet 3     furosemide (LASIX) 40 MG tablet Take 1 tablet (40 mg total) by mouth daily. 90 tablet      levothyroxine (SYNTHROID, LEVOTHROID) 150 MCG tablet TAKE 1 TABLET (150 MCG TOTAL) BY MOUTH DAILY AT 6:00 AM. 90 tablet 0     metoprolol succinate (TOPROL-XL) 25 MG Take 0.5 tablets (12.5 mg total) by mouth 2 (two) times a day. 90 tablet 3     omeprazole (PRILOSEC) 20 MG capsule Take 1 capsule (20 mg total) by mouth daily. 90 capsule 3     predniSONE (DELTASONE) 20 MG tablet Take 2 tabs (40mg total) daily. 60 tablet 0     sulfamethoxazole-trimethoprim (BACTRIM) 400-80 mg per tablet Take 1 tablet by mouth daily. 30 tablet 3     warfarin (COUMADIN) 2.5 MG tablet Take 2.5mg (1 tab) on Tue Thur Sat, and 3.75mg (1 and 1/2 tabs) on all other days.  OR AS DIRECTED.  Adjust dose based on INR. 120 tablet 1     predniSONE (DELTASONE) 10 mg tablet Take 1 tablet (10 mg total) by mouth daily. 30 tablet 12     predniSONE (DELTASONE) 20 MG tablet Take 1 tablet (20 mg total) by mouth daily. 90 tablet 1     predniSONE (DELTASONE) 5 MG tablet Take 1 tab a day. 30 tablet 2     No current facility-administered medications for this visit.        Allergies   Allergen Reactions     Penicillins      Patient had reaction to medication 65 years ago, may or may not have been the penicillin. Had huge rash.       Objective:     Vitals:    12/07/17 0753   BP: 122/70   Pulse: 84   Resp: 16     Wt Readings from Last 3 Encounters:   12/07/17 173 lb (78.5 kg)   11/22/17 180 lb (81.6 kg)   10/31/17 174 lb (78.9 kg)       General Appearance:   Alert, cooperative and in no acute distress.   HEENT:  No scleral icterus; the mucous membranes were pink and moist.   Neck: JVP normal. No HJR.   Chest: The spine was straight. The chest was symmetric.   Lungs:   Respirations  unlabored; the lungs are clear to auscultation.   Cardiovascular:    Irregularly irregular. S1 and S2 without murmur, clicks or rubs. Radial and posterior tibial pulses are intact and symmetrical.    Abdomen:  Soft, nontender, nondistended, bowel sounds present   Extremities: No cyanosis, clubbing, or edema.   Skin: No xanthelasma.   Neurologic: Mood and affect are appropriate.         Lab Review   Lab Results   Component Value Date    CREATININE 1.16 11/22/2017    BUN 24 11/22/2017     11/22/2017    K 4.7 11/22/2017    CL 99 11/22/2017    CO2 26 11/22/2017     Lab Results   Component Value Date     (H) 11/22/2017     BNP (pg/mL)   Date Value   11/22/2017 258 (H)   08/08/2017 229 (H)   07/26/2017 272 (H)     Creatinine (mg/dL)   Date Value   11/22/2017 1.16   08/08/2017 1.41 (H)   07/26/2017 1.30   07/11/2017 1.16       Cardiographics  Echocardiogram: 12/11/2016  Summary     When compared to the previous study dated 12/5/2014, there are changes noted. left ventricular systolic function appears normal on the current study..    Left ventricle ejection fraction is normal. The calculated left ventricular ejection fraction is 58%.    Thoracic Aorta: The aortic root is mildly dilated.     NM pharmacological stress test 4/26/2017  Conclusion     The pharmacologic nuclear stress test is negative for inducible myocardial ischemia or infarction.    When compared to the images of 7/20/2016, there has been no significant change.            20 minutes were spent face to face with the patient with greater than 50% spent on education and counseling.      Vero Larson, [facility]   Heart Failure Clinic

## 2021-06-14 NOTE — PROGRESS NOTES
PULMONARY OUTPATIENT FOLLOW UP NOTE    Assessment:      1. Radiation induced pneumonitis  Slowly taper down steroids, currently prednisone 40 mg daily, plan to decrease to 30 mg daily for 3 weeks then 20 mg daily, continue bactrim for PCP prophylaxis   2. Pulmonary fibrosis  Lung biopsy was positive for usual interstitial pneumonia. Labs showed (+) TETE, elevated anti Sm antibody.   3. Dysphagia  Video swallow study and speech therapy evaluation   Chin tuck maneuver. Thick all fluid until speech evaluation.   4. Lung cancer  PET 1/15/38057 (+) RLL mass, RUL and LLL nodules. (+) mediastinal adenopathy.   S/p EBUS with negative pathology results.   S/p wedge biopsy of RLL mass and RUL nodule, path (+) Squamous cell lung cancer. Diagnosed with stage IIIA. Finished stereotactic radiosurgery RUL and RLL December 2016.   Enlarging peripheral lingular nodular lesion. S/p CT guided biopsy 4/2017 (+)  Squamous cell carcinoma. S/p Cyberknife therapy May 2017  5. Carcinoma in situ of bladder   6. Ischemic cardiomyopathy s/p drug eluting stent Synergy proximal LAD on 8/2/2016  7. Paroxysmal atrial fibrillation anticoagulated  8. Hx hypothyroidism     Plan:   1. Continue prednisone 40 mg daily for one week then down to 30 mg daily for 3 weeks then down to 20 mg daily   2. Continue bactrim 400/80  3. Albuterol HFA / nebulizer as needed  4. Video swallow study and speech evaluation   5. Chin tuck maneuver  6. Thick all fluids until speech evaluation  7. Continue prilosec daily   8. Follow up in 2 months      Chad Pham  Pulmonary / Critical Care  12/12/2017    CC:      Chief Complaint   Patient presents with     Follow Up     CT/ prednisone (taking 40mg now)       HPI:       Kartik Tam is an 82 y.o. male who presents for follow up.  Pt has history of ischemic cardiomyopathy s/p stent proximal LAD 8/2/2016, atrial fibrillation on coumadin, hypothyroidism, carcinomal in situ bladder of bladder, squamous cell lung  cancer stage IIIA s/p radiation therapy RUL and RLL lesion, squamous cell lung cancer s/p Cyberknife therapy, pulmonary fibrosis, biopsy UIP.   Treated for radiation pneumonitis. Reports increase respiratory symptoms (shortness of breath with activity and dry cough) with decreased dose of steroids, patient contacted clinic and prednisone was increased to 40 mg daily. Bactrim was added for PCP prophylaxis. Patient stopped using inhaled steroids.   Patient reports improvement of shortness of breath, mild dry cough, his activity level has slowly increased. Denies orthopnea or PND. No swelling of LEs. Reports chest pain on his right side. Denies fever or chills.    INR is therapeutic.   Denies acid reflux symptoms while taking PPI. No postnasal drip.   Patient is concerned about swallowing problems mostly with liquids.   Had flu vaccine.  Pt reports previous tobacco user, 1-2 ppd for 30 years, quit 35 years ago. Worked in sales.  Family hx significant for two brothers with malignancy, one with throat cancer and other with colon cancer.     Past Medical History:   Diagnosis Date     A-fib 12/05/2014     Bladder cancer      Bronchiectasis RLL      Cataract      Colon polyp      Coronary artery disease      Dementia?      DJD (degenerative joint disease) hips      Hearing impaired + tinnitus      High cholesterol      Hyperlipidemia      Hypertension     pt. denies     Hypothyroidism     hypo     Lung cancer      Lung nodule     RLL     Micturition syncope      Osteopenia      Pneumonitis     acute radiation     Pneumothorax      Pneumothorax, postoperative 11/10/2016     Pulmonary fibrosis      Shingles      Shortness of breath      Medications:     Prior to Admission medications    Medication Sig Start Date End Date Taking? Authorizing Provider   dronedarone (MULTAQ) 400 mg tablet Take 1 tablet (400 mg total) by mouth daily. 10/12/15  Yes Agnes Hicks MD   LEVOTHYROXINE SODIUM (LEVOTHYROXINE, BULK, MISC) Take 150  mcg by mouth every morning.    Yes Historical Provider, MD   simvastatin (ZOCOR) 20 MG tablet Take 20 mg by mouth bedtime.   Yes Historical Provider, MD   warfarin (COUMADIN) 5 MG tablet Please take 5 g on 12/6/2014 and 12/7/2014. 12/6/14 12/6/15 Yes Ilana Jarrett MD   doxycycline (VIBRAMYCIN) 100 MG capsule Take 1 capsule (100 mg total) by mouth 2 (two) times a day. 12/1/15 12/8/15  Chad Pham MD     Social History     Social History     Marital status:      Spouse name: N/A     Number of children: 3     Years of education: N/A     Occupational History     Not on file.     Social History Main Topics     Smoking status: Former Smoker     Packs/day: 3.00     Years: 30.00     Quit date: 12/5/1979     Smokeless tobacco: Never Used     Alcohol use No      Comment: alcoholism in the past, quit drinking 1972      Drug use: No     Sexual activity: Not on file     Other Topics Concern     Not on file     Social History Narrative     Family History   Problem Relation Age of Onset     Diabetes Mother      Heart disease Mother      No Medical Problems Father      Colon cancer Brother      Throat cancer Brother      Review of Systems  A 12 point comprehensive review of systems was negative except as noted.      Objective:     Vitals:    12/12/17 1319   BP: 106/60   Pulse: 84   Resp: 24   SpO2: 97%   Weight: 171 lb 6.4 oz (77.7 kg)   SpO2 at rest on RAis 96%.   SpO2 after ambulating 300ft on RA is 89-90%.    Gen: awake, alert, no distress  HEENT: pink conjunctiva, moist mucosa, Mallampati II/IV  Neck: no thyromegaly, masses or JVD  Lungs:  inspiratory crackles both bases  CV: regular, no murmurs or gallops appreciated  Abdomen: soft, NT, BS wnl  Ext: no edema  Neuro: CN II-XII intact, non focal      Diagnostic tests:     LABS    TETE (+)  RF negative  Smith autoantibodies (+)  Aspergillus fumigatus IgG (+)    BAL cell count Neutrophils 36%, Lymphocytes 8%, Macrophages 50% Eosinophils 6%  BAL  cultures negative for fungal/mycobacterial infection.      BRONCHOSCOPY / EBUS - CYTOLOGY 1/22/2016   A) LYMPH NODE, 4R, ENDOBRONCHIAL ULTRASOUND-GUIDED FINE NEEDLE      ASPIRATION FOR CYTOLOGIC EXAM:       - LYMPHOID TISSUE PRESENT, NEGATIVE FOR TUMOR     B) LYMPH NODE, SUBCARINAL, ENDOBRONCHIAL ULTRASOUND-GUIDED FINE      NEEDLE ASPIRATION FOR CYTOLOGIC EXAM:       - LYMPHOID TISSUE PRESENT, NEGATIVE FOR TUMOR     C) LYMPH NODE, 11R, ENDOBRONCHIAL ULTRASOUND-GUIDED FINE NEEDLE      ASPIRATION FOR CYTOLOGIC EXAM:       - SCANT LYMPHOID TISSUE OBTAINED, NEGATIVE FOR MALIGNANCY     D) LUNG, LOWER RIGHT LOBE, BRONCHIAL ALVEOLAR LAVAGE FOR      CYTOLOGIC EXAM:       1) NEGATIVE FOR MALIGNANT CELLS       2) NUMEROUS ALVEOLAR MACROPHAGES AND OCCASIONAL BRONCHIAL          CELLS ARE PRESENT    CT GUIDED BX - RIGHT LUNG MASS 1/29/16     - RARE ATYPICAL BRONCHIAL CELLS    CT GUIDED BIOPSY of LUNG MASS 4/24/2017  Final Diagnosis   LEFT LUNG MASS, NEEDLE CORE BIOPSY:     -  SQUAMOUS CELL CARCINOMA      CT CHEST WO CONTRAST  12/4/2015 11:32 AM   LUNGS AND PLEURA: Patient has mild, peripheral based interstitial fibrosis, bronchiectasis and minimal areas of honeycombing. This has a peripheral and basilar distribution but with sparing the of subpleural lung. In addition, there is a focal 3 x 3 x 2 cm ovoid subpleural area of more extensive cystic bronchiectasis and peribronchiolar thickening in the right lower lobe that has not changed in the short interval (but progressed since 2010). Similary but smaller focal area of bronchiectasis noted   further superiorly in the right upper lobe and medially in the left lower lobe (coronal images 120-109). No air-fluid levels or an obvious mass/soft tissue component. Calcified granulomas noted in the right lower lobe. No change in the 4 x 2 mm ovoid   nodule medial to the focal bronchiectasis (series 3 image 89). There are two nodules in the left upper lobe/lingula, largest measuring 4-5mm  and subpleural in location (image60).Tiny groundglass nodule in the left lower lobe is also noted (series 3   image 80).   MEDIASTINUM: Multiple small mediastinal nodes are noted measuring a centimeter in short axis. There are coronary artery calcifications.   LIMITED UPPER ABDOMEN: Unremarkable.   MUSCULOSKELETAL: No suspicious lesions on bone windows.   IMPRESSION:   1. Patient has interstitial lung disease with a distribution suggestive of NSIP. In addition there are more focal areas of bronchiectasis and peribronchiolar thickening as noted above, largest in the right lower lobe. No air-fluid levels, mucus plugging or obvious soft tissue mass. While this may be inflammatory/infectious in nature, these patients are at higher risk for malignancies as well. Suggest pulmonary consultation for further evaluation.   2. Few pulmonary nodules as noted above.   3. Mildly prominent mediastinal nodes are likely reactive.   4. Coronary artery calcifications.    PET FDG/CT 1/15/2016 8:47 AM   INDICATION: Pulmonary nodule   COMPARISON: CTs from 12/04/2015, 11/30/2015, and 11/29/2010   FINDINGS:   HEAD AND NECK: Marked generalized cerebral and cerebellar volume loss, likely age-related.   CHEST: 3.5 x 1.7 cm partially cavitary right lower lobe pulmonary mass is markedly FDG avid (SUV max 9.2) and has enlarged slowly since 11/29/2010, when it was a 0.8 x 0.8 cm groundglass opacity.   Interlobular pulmonary septal thickening, subpleural bands, and traction bronchiectasis with a peripheral predominance and associated inflammatory FDG activity. Uniform, moderately FDG avid, nonenlarged, noncalcified middle mediastinal and bilateral   hilar lymph nodes.   1.1 cm partially cavitary opacity in the periphery of the superior segment of the left lower lobe is moderately FDG avid (SUVmax 6.5), not significantly changed anatomically from 12/04/2015, but outside of the imaged volume on the older scans. Calcified   atherosclerosis, including  the coronary arteries.   ABDOMEN/PELVIS: No abnormal FDG activity. Negative adrenals. Moderate calcified atherosclerosis.   MUSCULOSKELETAL: Bilateral total hip arthroplasties. Moderate degenerative changes cervical and lumbar spine.   IMPRESSION:   CONCLUSION:   1. 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 Reactive middle mediastinal and bilateral hilar lymphadenopathy.   2. 3.5 cm cavitary right lower lobe pulmonary mass has been enlarging slowly since 2010 and is suspicious for low-grade pulmonary adenocarcinoma. No definite evidence of metastases, but reactive FDG activity in right hilar and mediastinal lymph nodes   from interstitial lung disease could obscure underlying tumor metabolism.   3. 1.1 cm partially cavitary opacity in the superior segment of the left lower lobe is indeterminate. Its appearance and FDG activity are similar the suspected tumor in the contralateral right lung and a synchronous tumor is favored, but it could also   be a focal area of inflammation from the interstitial lung disease.    CT CHEST WO CONTRAST 8/15/2016 7:56 AM  COMPARISON: Chest CT dated 12/4/2015  LUNGS AND PLEURA: Central airways are patent. Mild bronchiectasis is again noted. No bronchial wall thickening. Basilar predominant reticular opacities with architectural distortion and traction bronchiectasis and bronchiolectasis have not significantly   changed. A subtle background of groundglass has minimally increased. A cavitary mass in the peripheral right lower lobe has increased in size, measuring 4.4 cm x 2.3 cm (previously 4.1 cm x 1.7 cm; series 3 image 90). A 2.9 cm x 1.0 cm opacity in the   peripheral posterior right upper lobe previously measured 1.4 cm x 0.6 cm (image 53). A peripheral opacity in the medial superior segment of the left lower lobe measuring 1.5 cm x 0.8 cm previously measured 1.1 cm x 0.9 cm (image 68).  MEDIASTINUM:  Atherosclerotic disease including significant LAD coronary artery disease. Scattered additional coronary calcifications noted. Mediastinal lymphadenopathy has increased. A 1.5 cm right lower paratracheal lymph node previously measured 1.2 cm. A 1.2 cm subcarinal lymph node previously measured 1.0 cm. There is a small amount of pericardial fluid.  LIMITED UPPER ABDOMEN: Negative.  MUSCULOSKELETAL: Osteopenia and multilevel vertebral body degenerative change. There is grade 1 anterolisthesis of C7 on T1.  CONCLUSION:  1. Increasing size of the right lower lobe cavitary mass, favoring low-grade neoplasm. Additional peripheral opacities have increased in size as well, which may represent progression of fibrosis or slow-growing neoplasms.  2. Increased mediastinal lymphadenopathy.  3. Pulmonary fibrosis in a NSIP pattern. Increased groundglass represents an active/cellular component.  4. Atherosclerotic disease living coronary artery disease.  5. Osteopenia and degenerative change.    PET CT scan 11/7/2016  1. Right upper lobe wedge resection has been performed.  2. Right lower lobe pulmonary mass has enlarged and increased in FDG activity. Fiducial markers have been placed in it.  3. Chronic interstitial lung disease has progressed slightly.  4. 1.1 cm opacity in the periphery of the superior segment of the left lower lobe has decreased in FDG activity and could be a focus of inflammation as a part of the interstitial lung disease, but a tumor at this site is not excluded.    Chest CT scan 11/12/2016  1. No evidence of pulmonary emboli.  2. Compared to the PET/CT done 5 days ago, there has been little change in the appearance of either chest. Specifically, patient's had wedge resection of the right upper lobe pleural-based opacity. Focal pneumatocele or loculated pneumothorax noted adjacent to the staple line with a complex, very small hydropneumothorax again noted in the right chest. Tiny subcutaneous emphysema with  air tracking into the chest wall anteriorly at rib 4 is again seen likely site of chest tube.   3. Patient has underlying UIP but has developed focal interstitial and groundglass opacities in the left upper lobe and left lower lobe though unchanged since the PET CT, this is new since the August study and findings suggest an acute component of his interstitial disease. The lack of symmetry suggest this is not simply superimposed pulmonary edema. Suggest pulmonary consultation.  4. Cavitary pleural-based mass in the right lower lobe fiducial markers mild mediastinal adenopathy is unchanged.    CTA CHEST PE RUN 12/11/2016 1:38 AM  INDICATION: Shortness of breath.  COMPARISON: Chest CT 08/15/2016.  ANGIOGRAM CHEST: No pulmonary emboli.  LUNGS AND PLEURA: Interstitial lung disease is present with subpleural reticular nodular densities and scattered groundglass opacities. These findings have progressed since the prior exam. Stable bronchiectasis. 3.7 x 2.9 cm lobulated and partially cavitary mass in the right lower lobe has increased in size. Fiduciary markers are now present. 9 mm nodule in the anterior right lower lobe (image 242) has increased in size, interval right upper lobe wedge resection with loculated pleural air, 5 mm   lingular nodule increased in size. Tiny right pleural effusion.  MEDIASTINUM: Mediastinal and right hilar lymphadenopathy is slightly decreased.  LIMITED UPPER ABDOMEN: Negative.  MUSCULOSKELETAL: Negative.  IMPRESSION:   1. No pulmonary emboli.  2. Partially cavitated malignant mass in the right lower lobe is mildly increased in size measuring 3.7 x 2.9 cm. Fiducial markers are now present within the mass. Progression of interstitial lung disease. New postsurgical changes right upper lobe with loculated pleural air in this location. There is a new tiny right pleural effusion.  3. 9 mm nodule anterior right lower lobe is increased in size as has a 5 mm nodule in the lingula.  4. Mediastinal  lymphadenopathy is decreased    CTA CHEST PE RUN 3/12/2017 10:42 AM   INDICATION: Shortness of breath, history of lung cancer  COMPARISON: Chest CT dated 3/6/2017.  ANGIOGRAM CHEST: Negative for pulmonary emboli. Negative for thoracic aortic dissection. There is mild enlargement of the proximal descending thoracic aorta, which is unchanged. There is atherosclerotic disease including coronary artery calcification.   Aortic tortuosity is noted.  LUNGS AND PLEURA: No significant change from the prior study. Extensive groundglass, reticular, and confluent opacities throughout the lungs. Nodule in the peripheral left upper lobe. Mass along the posterior right hemithorax. No pneumothorax.  MEDIASTINUM: The heart is mildly enlarged. Mediastinal and right hilar lymphadenopathy persists.  LIMITED UPPER ABDOMEN: Normal adrenal glands.  MUSCULOSKELETAL: Osteopenia.  CONCLUSION:  1. No PE.  2. Otherwise no change from the prior study.    CTA CHEST PE RUN  4/25/2017 11:54 AM  INDICATION: dyspnea  TECHNIQUE: Helical acquisition through the chest was performed during the arterial phase of contrast enhancement using IV contrast. 2D and 3D reconstructions were performed by the CT technologist. Dose reduction techniques were used.   IV CONTRAST: Iohexol (Omni) 100 mL  COMPARISON: 3/12/2017  FINDINGS:  ANGIOGRAM CHEST: Negative for pulmonary emboli. Negative for thoracic aortic dissection and aneurysms.  LUNGS AND PLEURA: Marked fibrosis with varicoid bronchiectasis throughout the right lung, and associated parenchymal opacities, mildly progressed when compared to previous. A small right pleural effusion is also larger. Postop changes in the right lung and right sided fiducial markers again seen. There is a new fiducial marker adjacent to the lingular nodule that was recently biopsied. This nodule measures 1.5 cm. Mild peripheral fibrosis in the left lung, similar to previous. No pneumothorax.  MEDIASTINUM: There are several mildly  prominent mediastinal lymph nodes, unchanged from previous. Significant coronary artery calcification.  LIMITED UPPER ABDOMEN: Negative.   MUSCULOSKELETAL: Negative.  CONCLUSION:  1.  No pulmonary embolus.  2.  Stable lingular nodule, now with adjacent fiducial marker.  3.  Increasing pulmonary opacities throughout the right lung, superimposed on chronic fibrosis. Findings may represent progressive fibrosis or superimposed infiltrate.    CT CHEST W CONTRAST 8/9/2017 9:18 AM  INDICATION: Stage IIIa multifocal squamous carcinoma right lung diagnosed November 20, 2016.  COMPARISON: 4/25/2017  LUNGS AND PLEURA: Patient's developed a tiny hydropneumothorax on the right. Trace right-sided effusion has decreased in size. No appreciable change in the fibrosis and the varicose bronchiectasis in the right upper and lower lobes. However, decrease in the superimposed airspace opacities throughout the right lung. Fiducial markers are again noted. Resolution of the pleural-based nodule in the lingula adjacent to the fiducial marker. Nothing measurable remains. Mild subpleural fibrosis noted throughout the left lung, unchanged.   MEDIASTINUM: Mildly prominent mediastinal nodes are unchanged, none over a centimeter in size. No central pulmonary emboli. Volume loss of slight shift of the mediastinum into the right chest is unchanged  LIMITED UPPER ABDOMEN: Unremarkable.  MUSCULOSKELETAL: No suspicious lesions.  CONCLUSION:  1.  Patient's developed a tiny right apical pneumothorax. Decrease in the very small right-sided pleural effusion. Also decrease in airspace opacities throughout the right chest.  2.  Resolution of the pleural-based lingular nodule.  3.  No change in the extensive fibrosis and varicoid bronchiectasis in the right lung and mild subpleural fibrosis scattered throughout the left lung.    PFTs (12/23/2015)  FEV1/FVC is 85% and is normal.   FEV1 is 3.26L (92%) predicted and is normal.   FVC is 3.83L (92%) predicted  and normal.   There was no improvement in spirometry after a single inhaled dose of bronchodilator.   TLC is 5.27L (70%) predicted and is reduced.   RV is 11.41L (47%) predicted and is reduced.   DLCO is 15.62ml/min/hg (63%) predicted and is reduced when it is corrected for hemoglobin.   Flow volume loops indicate no abnormalities.    PFTs (11/4/2016)  FEV1/FVC is 87 and is normal.  FEV1 is 2.60 L (89%) predicted and is normal.  FVC is 2.98 L (75%) predicted and normal.  There was no improvement in spirometry after a single inhaled dose of bronchodilator.  TLC is 4.82 L (66%) predicted and is reduced.  RV is 1.79 L (61%) predicted and is reduced.  DLCO is 54% predicted and is reduced when it is corrected for hemoglobin.

## 2021-06-14 NOTE — PROGRESS NOTES
Gunnar had an appointment in heart care today and stopped her on his way out.  Has been having more and more SOB. Decreased his prednisone down to 10 mg a day and has really been experiencing SOB since then.   O2 sats 90 to 91 % on room air. RR is in the 30's and slightly labored. Will have him increase his prednisone back to 20 mg a day and call me on Monday with an update.  Per Dr. Oneill. If no improvement or breathing gets worse go to the Ed to be evaluated. If no improvement by Friday has agreed to go to the Ed.

## 2021-06-14 NOTE — PROGRESS NOTES
Assessment/Plan:     1. Heart failure with preserved ejection fraction, NYHA class IV: Kartik Tam appears not compensated.  He is having shortness of breath at rest.  He states his shortness of breath has been present the last 4-5 days.  I recommended admission to the hospital for further treatment and evaluation.  He refused today.  I will check a BMP and BNP.  I encouraged him to increase his Lasix to 40 mg twice a day which was his original dose a month ago.  He is also going to go over to pulmonary to see about his prednisone being increased.  I recommended if symptoms do not improve in the next day or so to go to the emergency department.  His wife is in agreement.    2. Hypertension: Controlled.  Blood pressure 124/72.  He continues metoprolol 12.5 mg 2 times a day.    3. Persistent atrial fibrillation: Rate slightly elevated likely due to respiratory status.  He will continue metoprolol 12.5 mg 2 times a day.  He is also on digoxin 125 mcg daily.  He is also on warfarin for anticoagulation.    Follow-up with Dr. Hicks in August and in the heart failure clinic in 2 weeks or sooner if needed    Subjective:     Kartik Tam is seen at [facility] heart failure clinic today for continued follow-up.  His wife accompanies him today.  He follows up for heart failure with preserved ejection fraction.  His most recent echocardiogram was done December 2016 which showed ejection fraction of 58%.  He had a nuclear stress test April 26, 2017 which is normal.  He has a past medical history significant for hypertension, hyperlipidemia, persistent atrial fibrillation, and lung cancer in which he is in remission for.    Today, he comes in with complaints of shortness of breath the last 4-5 days.  He states that his prednisone was decreased about a month ago along with his Lasix.  His weights have remained stable.  He has occasional dizziness but denies any falls.  He denies any abdominal bloating or lower  extremity edema.  He denies any orthopnea or PND.  He denies any productive cough, fevers, or chills.  Denies orthopnea, PND, palpitations, chest pain, abdominal fullness/bloating and lower extremity edema.      He is monitoring home weights which are stable between 169-171 pounds.  He is following a low sodium diet.  He participates in regular physical activity including walking.      Review of Systems:   General: WNL  Eyes: Visual Distubance  Ears/Nose/Throat: WNL  Lungs: Cough, Shortness of Breath  Heart: Shortness of Breath with activity  Stomach: WNL  Bladder: WNL  Muscle/Joints: WNL  Skin: WNL  Nervous System: Dizziness  Mental Health: WNL     Blood: Easy Bruising     Patient Active Problem List   Diagnosis     Persistent atrial fibrillation     HTN (hypertension)     Bronchiectasis without complication     CIS (carcinoma in situ of bladder), dx 2010.      Mass of upper lobe of left lung     Interstitial lung disease     HLD (hyperlipidemia)     Squamous cell carcinoma of right lung     Coronary artery disease involving native coronary artery of native heart without angina pectoris     Pleural effusion     Hypothyroidism, unspecified type     Pulmonary fibrosis     Chronic anticoagulation, [id number] score 3     Epididymal cyst     Acute radiation pneumonitis     Heart failure with preserved ejection fraction     Dyspnea on exertion     Adjustment disorder with depressed mood       Past Medical History:   Diagnosis Date     A-fib 12/05/2014     Bladder cancer      Bronchiectasis RLL      Cataract      Colon polyp      Coronary artery disease      Dementia?      DJD (degenerative joint disease) hips      Hearing impaired + tinnitus      High cholesterol      Hyperlipidemia      Hypertension     pt. denies     Hypothyroidism     hypo     Lung cancer      Lung nodule     RLL     Micturition syncope      Osteopenia      Pneumonitis     acute radiation     Pneumothorax      Pneumothorax, postoperative 11/10/2016      Pulmonary fibrosis      Shingles      Shortness of breath        Past Surgical History:   Procedure Laterality Date     APPENDECTOMY       Attempted lung  biopsy  04/19/2017     BLADDER SURGERY  2010     CARDIAC CATHETERIZATION  08/02/2016    stent x 1     CARDIAC ELECTROPHYSIOLOGY STUDY AND ABLATION       COLONOSCOPY       CORONARY STENT PLACEMENT       JOINT REPLACEMENT Bilateral     Hips     LUNG CANCER SURGERY  12/2016 and 01/2017    Cyber Knife-9 treatments     OK CYSTOURETHROSCOPY,FULGUR <0.5 CM LESN N/A 1/25/2016    Procedure: CYSTOSCOPY BLADDER BIOPSIES TRANSURETHRAL RESECTION BLADDER TUMOR;  Surgeon: Antoine Rodriguez MD;  [place] [facility] [place]  Service: Urology     THORACOSCOPY Right 9/26/2016    Procedure:   RIGHT THORACOSCOPY/ RIGHT UPPER LOBE AND RIGHT LOWER LOBE WITH NEEDLE WIRE LOCALIZATION MULTIPLE WEDGE RESECTION NODE SAMPLING FIDUCIALS PLACEMENT;  Surgeon: Brandon Pavon MD;  Location: [facility];  Service:      THORACOSCOPY Right 9/26/2016    Procedure: THORACOSCOPY FOR CONTROL POST OPERATIVE BLEED;  Surgeon: Brandon Pavon MD;  Location: [facility];  Service:        Family History   Problem Relation Age of Onset     Diabetes Mother      Heart disease Mother      No Medical Problems Father      Colon cancer Brother      Throat cancer Brother        Social History     Social History     Marital status:      Spouse name: N/A     Number of children: 3     Years of education: N/A     Occupational History     Not on file.     Social History Main Topics     Smoking status: Former Smoker     Packs/day: 3.00     Years: 30.00     Quit date: 12/5/1979     Smokeless tobacco: Never Used     Alcohol use No      Comment: alcoholism in the past, quit drinking 1972      Drug use: No     Sexual activity: Not on file     Other Topics Concern     Not on file     Social History Narrative       Current Outpatient Prescriptions   Medication Sig Dispense Refill     aspirin 81 MG EC  tablet Take 81 mg by mouth daily.       atorvastatin (LIPITOR) 20 MG tablet Take 1 tablet (20 mg total) by mouth at bedtime. 90 tablet 3     digoxin (LANOXIN) 125 mcg tablet Take 1 tablet (125 mcg total) by mouth every morning. 90 tablet 3     furosemide (LASIX) 40 MG tablet Take 1 tablet (40 mg total) by mouth 2 (two) times a day at 9am and 6pm. 180 tablet 3     levothyroxine (SYNTHROID, LEVOTHROID) 150 MCG tablet TAKE 1 TABLET (150 MCG TOTAL) BY MOUTH DAILY AT 6:00 AM. 90 tablet 0     metoprolol succinate (TOPROL-XL) 25 MG Take 0.5 tablets (12.5 mg total) by mouth 2 (two) times a day. 90 tablet 3     omeprazole (PRILOSEC) 20 MG capsule Take 1 capsule (20 mg total) by mouth daily. 90 capsule 3     predniSONE (DELTASONE) 10 mg tablet Take 1 tablet (10 mg total) by mouth daily. 30 tablet 12     warfarin (COUMADIN) 2.5 MG tablet Take 2.5mg (1 tab) on Tue Thur Sat, and 3.75mg (1 and 1/2 tabs) on all other days.  OR AS DIRECTED.  Adjust dose based on INR. 120 tablet 1     predniSONE (DELTASONE) 20 MG tablet Take 1 tablet (20 mg total) by mouth daily. 90 tablet 1     predniSONE (DELTASONE) 5 MG tablet Take 1 tablet (5 mg total) by mouth daily. 30 tablet 0     No current facility-administered medications for this visit.        Allergies   Allergen Reactions     Penicillins      Patient had reaction to medication 65 years ago, may or may not have been the penicillin. Had huge rash.       Objective:     Vitals:    11/22/17 1025   BP: 124/72   Pulse: (!) 102   Resp: 24     Wt Readings from Last 3 Encounters:   11/22/17 180 lb (81.6 kg)   10/31/17 174 lb (78.9 kg)   10/17/17 178 lb (80.7 kg)       General Appearance:   Alert, cooperative and in no acute distress.   HEENT:  No scleral icterus; the mucous membranes were pink and moist.   Neck: JVP normal. No HJR.   Chest: The spine was straight. The chest was symmetric.   Lungs:   Respirations unlabored; the lungs are clear to auscultation.   Cardiovascular:    Irregularly  irregular. S1 and S2 without murmur, clicks or rubs. Radial and posterior tibial pulses are intact and symmetrical.    Abdomen:  Soft, nontender, nondistended, bowel sounds present   Extremities: No cyanosis, clubbing. Trace bilateral lower extremity edema.   Skin: No xanthelasma.   Neurologic: Mood and affect are appropriate.         Lab Review   BMP and BNP pending          Cardiographics  Echocardiogram: 12/11/2016  Summary     When compared to the previous study dated 12/5/2014, there are changes noted. left ventricular systolic function appears normal on the current study..    Left ventricle ejection fraction is normal. The calculated left ventricular ejection fraction is 58%.    Thoracic Aorta: The aortic root is mildly dilated.           25 minutes were spent face to face with the patient with greater than 50% spent on education and counseling.      Vero Larson, [facility]   Heart Failure Clinic

## 2021-06-15 PROBLEM — J70.0: Status: ACTIVE | Noted: 2017-01-01

## 2021-06-15 PROBLEM — I50.30 HEART FAILURE WITH PRESERVED EJECTION FRACTION (H): Status: ACTIVE | Noted: 2017-01-01

## 2021-06-15 NOTE — PROGRESS NOTES
ANTICOAGULATION  MANAGEMENT    Reviewed records from recent Hospital Admission for dyspnea on exertion.    No adjustment to anticoagulation plan needed.  Patient sent back to TCU.    Nupur Servin RN

## 2021-06-15 NOTE — PROGRESS NOTES
[facility]    Facility:   [facility] [[id number]]   Code Status: FULL CODE  PCP: BABS Feliciano   Phone: [phone]   Fax: [phone]      CHIEF COMPLAINT/REASON FOR VISIT:  Chief Complaint   Patient presents with     Discharge Summary       HISTORY COURSE:  Kartik is a 82 y.o. male who has a past medical history of squamous cell carcinoma and is status post radiation treatment with a history of radiation pneumonitis.  He is currently on steroids at this time and he does have persistent atrial fibrillation is anticoagulated with chronic diastolic congestive heart failure.  Patient also has chronic shortness of breath and was then admitted to the hospital on 1/19/2018 with worsening shortness of breath.  He did have an episode of a fall and he was worked up very thoroughly.  CT scan of the chest was negative for pulmonary embolism however showed progression of his fibrotic changes and bronchiectasis.  Pulmonology and oncology saw the patient and increased prednisone dose.  He was also started on prophylactic Bactrim and he does have pulmonology follow-up.  He did have dizziness and frequent falls likely to dehydration and he also had tachycardia/bradycardia syndrome in the hospital and was transferred to [facility] for pacer placement.  His Lasix was discontinued in the hospital secondary to hypotension metoprolol dose was decreased.  Digoxin was resumed for heart rate control and aspirin was discontinued by cardiology.  Patient is also on Coumadin at this time.  They did increase the metoprolol to to 12.5 mg twice daily and he was treated appropriately and transferred here to the TCU at [facility] in stable condition.  He did have another hospitalization January 20 7 January 30 2018 secondary to shortness of breath workup was unremarkable was placed on prednisone.  He currently is doing great he is independent.  He has been normotensive and  afebrile.  Able to be ambulating without any problems.  Appetite good.  Sleeping well at night.  Review of Systems  Currently denies chills fever coughing wheezing chest pain dizziness or vertigo nausea vomiting diarrhea dysuria.  History of A. fib hypothyroidism degenerative joint disease hypertension dyspnea bladder cancer heart failure  Vitals:    02/08/18 0821   BP: 104/62   Pulse: 76   Resp: 16   Temp: (!) 96.5  F (35.8  C)   SpO2: 99%       Physical Exam   Constitutional: No distress.   HENT:   Head: Normocephalic.   Eyes: Pupils are equal, round, and reactive to light.   Neck: Neck supple. No thyromegaly present.   Cardiovascular:   Irregularity.  No edema.   Pacemaker.    Pulmonary/Chest: Breath sounds normal.   Abdominal: Bowel sounds are normal. There is no tenderness. There is no guarding.   Musculoskeletal:   Improving with his strength and balance.   Lymphadenopathy:     He has no cervical adenopathy.   Neurological: He is alert.     Lab Results   Component Value Date    WBC 12.9 (H) 01/27/2018    HGB 15.0 01/27/2018    HCT 44.6 01/27/2018    MCV 94 01/27/2018     01/27/2018     Results for orders placed or performed during the hospital encounter of 01/27/18   Basic metabolic panel   Result Value Ref Range    Sodium 137 136 - 145 mmol/L    Potassium 4.5 3.5 - 5.0 mmol/L    Chloride 103 98 - 107 mmol/L    CO2 20 (L) 22 - 31 mmol/L    Anion Gap, Calculation 14 5 - 18 mmol/L    Glucose 66 (L) 70 - 125 mg/dL    Calcium 8.9 8.5 - 10.5 mg/dL    BUN 27 8 - 28 mg/dL    Creatinine 1.08 0.70 - 1.30 mg/dL    GFR MDRD Af Amer >60 >60 mL/min/1.73m2    GFR MDRD Non Af Amer >60 >60 mL/min/1.73m2         MEDICATION LIST:  Current Outpatient Prescriptions   Medication Sig     acetaminophen (TYLENOL) 650 MG CR tablet Take 650 mg by mouth daily as needed for pain.     atorvastatin (LIPITOR) 20 MG tablet Take 1 tablet (20 mg total) by mouth at bedtime.     digoxin (LANOXIN) 125 mcg tablet Take 2 tablets (250 mcg  total) by mouth daily.     fludrocortisone (FLORINEF) 0.1 mg tablet Take 1 tablet (0.1 mg total) by mouth daily.     levothyroxine (SYNTHROID, LEVOTHROID) 150 MCG tablet Take 150 mcg by mouth daily.     metoprolol succinate (TOPROL-XL) 25 MG Take 0.5 tablets (12.5 mg total) by mouth 2 (two) times a day.     omeprazole (PRILOSEC) 20 MG capsule Take 1 capsule (20 mg total) by mouth daily.     predniSONE (DELTASONE) 20 MG tablet Take 2 tablets (40 mg total) by mouth daily with breakfast.     warfarin (COUMADIN) 2.5 MG tablet Take 1.25-2.5 mg by mouth See Admin Instructions. Takes 1.25 mg on Sunday, Tuesday, and Thursday and 2.5 mg rest of the week       DISCHARGE DIAGNOSIS:    ICD-10-CM    1. Squamous cell lung cancer, right C34.91    2. 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 Pulmonary fibrosis J84.10    4. Persistent atrial fibrillation I48.1        MEDICAL EQUIPMENT NEEDS:  none    DISCHARGE PLAN/FACE TO FACE:  I certify that services are/were furnished while this patient was under the care of a physician and that a physician or an allowed non-physician practitioner (NPP), had a face-to-face encounter that meets the physician face-to-face encounter requirements. The encounter was in whole, or in part, related to the primary reason for home health. The patient is confined to his/her home and needs intermittent skilled nursing, physical therapy, speech-language pathology, or the continued need for occupational therapy. A plan of care has been established by a physician and is periodically reviewed by a physician.  Date of Face-to-Face Encounter: February 8, 2018    I certify that, based on my findings, the following services are medically necessary home health services: Discharging to home with current medications he will be discharging on February 8 he will apparently need physical therapy nursing and home health aide and the care agency has not yet been identified    My clinical findings support the need for  the above skilled services because: (Please write a brief narrative summary that describes what the RN, PT, SLP, or other services will be doing in the home. A list of diagnoses in this section does not meet the CMS requirements.)  Secondary to laboratory studies INR is medication management    This patient is homebound because: (Please write a brief narrative summary describing the functional limitations as to why this patient is homebound and specifically what makes this patient homebound.)  Secondary to multiple chronic comorbid conditions    The patient is, or has been, under my care and I have initiated the establishment of the plan of care. This patient will be followed by a physician who will periodically review the plan of care.  He will follow-up with cardiology as previously arranged.  He will follow-up with his primary care doctor regarding medication management and any future laboratory studies.  Also is on Coumadin alternating 1 and 2 mg his next pro time is on February 12.  He had no further questions.    Discharge coordination of care greater than 30 minutes.    Electronically signed by: Michael Duane Johnson, CNP

## 2021-06-15 NOTE — ANESTHESIA PREPROCEDURE EVALUATION
Anesthesia Evaluation      Patient summary reviewed   No history of anesthetic complications     Airway   Mallampati: I  Neck ROM: full   Pulmonary - normal exam   (+) shortness of breath,                          Cardiovascular - normal exam  (+) hypertension, CAD, dysrhythmias, CHF, , hypercholesterolemia,     (-) murmur  ECG reviewed  Rhythm: irregular  Rate: normal,    no murmur      Neuro/Psych - negative ROS     Endo/Other    (+) hypothyroidism,      GI/Hepatic/Renal    (+) GERD,   chronic renal disease,           Dental - normal exam                        Anesthesia Plan  Planned anesthetic: MAC    ASA 3   Induction: intravenous   Anesthetic plan and risks discussed with: patient and spouse    Post-op plan: routine recovery

## 2021-06-15 NOTE — ANESTHESIA CARE TRANSFER NOTE
Last vitals:   Vitals:    01/10/18 1350   BP: 108/66   Pulse: 66   Resp: 16   Temp: (!) 35.7  C (96.3  F)   SpO2: 100%     Patient's level of consciousness is drowsy  Spontaneous respirations: yes  Maintains airway independently: yes  Dentition unchanged: yes  Oropharynx: oropharynx clear of all foreign objects    QCDR Measures:  ASA# 20 - Surgical Safety Checklist: WHO surgical safety checklist completed prior to induction  PQRS# 430 - Adult PONV Prevention: 4558F - Pt received => 2 anti-emetic agents (different classes) preop & intraop  ASA# 8 - Peds PONV Prevention: NA - Not pediatric patient, not GA or 2 or more risk factors NOT present  PQRS# 424 - Mariaa-op Temp Management: NA - MAC anesthesia or case < 60 minutes  PQRS# 426 - PACU Transfer Protocol: - Transfer of care checklist used  ASA# 14 - Acute Post-op Pain: NA - Patient under age 10y or did not go to PACU

## 2021-06-15 NOTE — PROGRESS NOTES
ANTICOAGULATION  MANAGEMENT    Reviewed records from recent Hospital Admission for tachy-chiki syndrome.    Patient was discharged to a TCU.  They will manage warfarin there.    Nupur Servin RN

## 2021-06-15 NOTE — PROGRESS NOTES
[facility] Patient Outreach:     Discharge Date::  2/8/18      Reason for TCU stay (discharge diagnosis)::  Falls, SOB, tachy/chiki syndrome s/p pacemaker placement      Are you feeling better, the same or worse since your discharge?:  Patient is feeling better          As part of your discharge plan, did they discuss home care with you?: Yes        Have your seen them yet, or are they scheduled to visit?: Yes                Do you have any follow up visits scheduled with your PCP or Specialist?:  No          I'm glad to hear you're doing well and we want you to continue to do well. Your PCP would like to see you for a follow-up visit. Can we help set that up for your today?: Yes            (RN) Patient transferred to [facility]? **If immediate concers (e.g. patient is feeling worse and/or not taking new medictations), send in basket message to PCP with quick summary of concern.: Yes

## 2021-06-15 NOTE — PROGRESS NOTES
Gunnar called to say he is going down in a few days on his prednisone and is wondering if he can then stop the Bactrim.  Instructed to go ahead and stop the antibiotic at that time and to call with any questions or concerns.

## 2021-06-15 NOTE — ANESTHESIA POSTPROCEDURE EVALUATION
Patient: Kartik Tam  CYSTOSCOPY, WITH BLADDER BIOPSY AND FULFURATION  Anesthesia type: MAC    Patient location: Phase II Recovery  Last vitals:   Vitals:    01/10/18 1430   BP: 126/74   Pulse: 76   Resp: 16   Temp:    SpO2: 98%     Post vital signs: stable  Level of consciousness: awake and responds to simple questions  Post-anesthesia pain: pain controlled  Post-anesthesia nausea and vomiting: no  Pulmonary: unassisted, return to baseline  Cardiovascular: stable and blood pressure at baseline  Hydration: adequate  Anesthetic events: no    QCDR Measures:  ASA# 11 - Mariaa-op Cardiac Arrest: ASA11B - Patient did NOT experience unanticipated cardiac arrest  ASA# 12 - Mariaa-op Mortality Rate: ASA12B - Patient did NOT die  ASA# 13 - PACU Re-Intubation Rate: NA - No ETT / LMA used for case  ASA# 10 - Composite Anes Safety: ASA10A - No serious adverse event    Additional Notes:

## 2021-06-15 NOTE — PROGRESS NOTES
[facility]      Facility:    [facility] [[id number]]  Code Status: UNKNOWN      Chief Complaint/Reason for Visit:  Chief Complaint   Patient presents with     H & P     Admit here to the TCU with shortness of breath on exertion and orthostatic hypotension.  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       HPI:   Kartik is a 82 y.o. male who residing at the [facility] care he was previously hospitalized with tachybradycardia syndrome with status post pacemaker.  He does have squamous cell carcinoma with status post radiation and radiation pneumonitis as well as atrial fibrillation.  He does have chronic shortness of breath he was then readmitted to the hospital on 1/27/2018 for dyspnea on exertion.  He woke up having worsening shortness of breath and generalized weakness with elevated respiratory rate.  He was brought to the emergency department and was found to be hypotensive at 100/62 and tachycardic about 109.  He was admitted and he was seen by pulmonology and cardiology.  Cardiology kept him on telemetry and ordered orthostatics.  He was diagnosed with orthostatic hypotension and he was encouraged to drink more fluids and he started to feel better.  He was put on Florinef on the day of discharge and was discharged to the TCU.  He also saw pulmonology were offered a prednisone taper but no other testing or intervention was indicated at this time.  He was treated appropriately and transferred here to the TCU at [facility] in stable condition.    Patient also had a fall with some back pain that was addressed and no real new issues there at this time he is not have any pain anymore.  And pain was not related to his hospitalization.  He is  currently doing well at this time says he had a good workout this morning and his shortness of breath was minimal.  His blood pressure has been improved and his O2 sats are 100% with a respiratory rate of 19.    Past Medical History:  Past Medical History:   Diagnosis Date     A-fib 12/05/2014     Bladder cancer      Bronchiectasis RLL      Cataract      CHF (congestive heart failure)      Chronic kidney disease      Colon polyp      Coronary artery disease      Dementia?      DJD (degenerative joint disease) hips      Hearing impaired + tinnitus      High cholesterol      History of transfusion      Hyperlipidemia      Hypertension     pt. denies     Hypothyroidism     hypo     Lung cancer      Lung nodule     RLL     Micturition syncope      Osteopenia      Pneumonitis     acute radiation     Pneumothorax      Pneumothorax, postoperative 11/10/2016     Pulmonary fibrosis      Shingles      Shortness of breath     SOB is his normal due to his lung disease           Surgical History:  Past Surgical History:   Procedure Laterality Date     APPENDECTOMY       Attempted lung  biopsy  04/19/2017     BLADDER SURGERY  2010     CARDIAC CATHETERIZATION  08/02/2016    stent x 1     CARDIAC ELECTROPHYSIOLOGY STUDY AND ABLATION       COLONOSCOPY       EP PACEMAKER INSERT N/A 1/19/2018    Procedure: EP Pacemaker Insertion;  Surgeon: Lewis Poole MD;  Location: [facility];  Service:      JOINT REPLACEMENT Bilateral     Hips     LUNG CANCER SURGERY  12/2016 and 01/2017    Cyber Knife-9 treatments     NV CYSTOURETHROSCOPY,BIOPSY N/A 1/10/2018    Procedure: CYSTOSCOPY, WITH BLADDER BIOPSY AND FULGURATION;  Surgeon: Gentry Sullivan MD;  [place] [facility]  Service: Urology     NV CYSTOURETHROSCOPY,FULGUR <0.5 CM LESN N/A 1/25/2016    Procedure: CYSTOSCOPY BLADDER BIOPSIES TRANSURETHRAL RESECTION BLADDER TUMOR;  Surgeon: Antoine Rodriguez MD;  [place] [facility]  Service: Urology     THORACOSCOPY  Right 9/26/2016    Procedure:   RIGHT THORACOSCOPY/ RIGHT UPPER LOBE AND RIGHT LOWER LOBE WITH NEEDLE WIRE LOCALIZATION MULTIPLE WEDGE RESECTION NODE SAMPLING FIDUCIALS PLACEMENT;  Surgeon: Brandon Pavon MD;  Location: [facility];  Service:      THORACOSCOPY Right 9/26/2016    Procedure: THORACOSCOPY FOR CONTROL POST OPERATIVE BLEED;  Surgeon: Brandon Pavon MD;  Location: [facility];  Service:        Family History:   Family History   Problem Relation Age of Onset     Diabetes Mother      Heart disease Mother      No Medical Problems Father      Colon cancer Brother      Throat cancer Brother        Social History:    Social History     Social History     Marital status:      Spouse name: N/A     Number of children: 3     Years of education: N/A     Social History Main Topics     Smoking status: Former Smoker     Packs/day: 3.00     Years: 30.00     Quit date: 12/5/1979     Smokeless tobacco: Never Used     Alcohol use No      Comment: alcoholism in the past, quit drinking 1972      Drug use: No     Sexual activity: Not Asked     Other Topics Concern     None     Social History Narrative          Review of Systems   Constitutional:        Patient denies any pain fevers chills nausea vomiting diarrhea change in vision hearing taste or smell weakness one side the other chest pain or shortness of breath.  He denies any incontinence of urine or stool polyphagia or polydipsia polyuria urgency frequency or burning with urination and the remainder the review of systems is negative.       Vitals:    02/02/18 0853   BP: 117/73   Pulse: 63   Resp: 19   Temp: (!) 96.3  F (35.7  C)   SpO2: 100%       Physical Exam   Constitutional: He appears well-developed and well-nourished. No distress.   Cardiovascular: Normal rate.    S1 and S2 without murmur.   Pulmonary/Chest: Effort normal and breath sounds normal. No respiratory distress. He has no wheezes. He has no rales.   Abdominal: Soft. Bowel sounds  are normal. He exhibits no distension. There is no tenderness.   Musculoskeletal: He exhibits edema. He exhibits no tenderness.   Neurological: He is alert. No cranial nerve deficit. He exhibits normal muscle tone.   Skin: Skin is warm and dry. He is not diaphoretic.   Psychiatric: He has a normal mood and affect. His behavior is normal.       Medication List:  Current Outpatient Prescriptions   Medication Sig     acetaminophen (TYLENOL) 650 MG CR tablet Take 650 mg by mouth daily as needed for pain.     atorvastatin (LIPITOR) 20 MG tablet Take 1 tablet (20 mg total) by mouth at bedtime.     digoxin (LANOXIN) 125 mcg tablet Take 2 tablets (250 mcg total) by mouth daily.     fludrocortisone (FLORINEF) 0.1 mg tablet Take 1 tablet (0.1 mg total) by mouth daily.     levothyroxine (SYNTHROID, LEVOTHROID) 150 MCG tablet Take 150 mcg by mouth daily.     metoprolol succinate (TOPROL-XL) 25 MG Take 0.5 tablets (12.5 mg total) by mouth 2 (two) times a day.     omeprazole (PRILOSEC) 20 MG capsule Take 1 capsule (20 mg total) by mouth daily.     ondansetron (ZOFRAN) 8 MG tablet Take 1 tablet (8 mg total) by mouth every 8 (eight) hours as needed.     predniSONE (DELTASONE) 20 MG tablet Take 2 tablets (40 mg total) by mouth daily with breakfast.     sulfamethoxazole-trimethoprim (SEPTRA) 400-80 mg per tablet Take 1 tablet by mouth daily for 10 days.     warfarin (COUMADIN) 2.5 MG tablet Take 1.25-2.5 mg by mouth See Admin Instructions. Takes 1.25 mg on Sunday, Tuesday, and Thursday and 2.5 mg rest of the week       Labs:      Assessment:    ICD-10-CM    1. Exertional dyspnea R06.09    2. Hypotension I95.9    3. Tachycardia-bradycardia I49.5    4. Pulmonary fibrosis J84.10    5. Persistent atrial fibrillation I48.1    6. Anticoagulation management encounter Z51.81     Z79.01        Plan: At this time will continue to monitor above medical problems and continue the prednisone taper.  He is currently rate is well controlled at this  time with his pacemaker.  At this time and his oxygen saturations are good.  We will continue to monitor and no other changes to care plan at this time.  His pain is well-managed.        Electronically signed by: Juan Jose Lucia DO

## 2021-06-15 NOTE — PROGRESS NOTES
ANTICOAGULATION  MANAGEMENT    Reviewed records from recent Hospital Admission for bladder cystoscopy and ED visit for urinary retention.    Patient will restart warfarin on Fri 1/12.  Plan to recheck 1/17 as scheduled.  Anticoagulation calendar updated.    Nupur Servin RN

## 2021-06-15 NOTE — PROGRESS NOTES
[facility]    Facility:   [facility] [[id number]]   Code Status: FULL CODE      CHIEF COMPLAINT/REASON FOR VISIT:  Chief Complaint   Patient presents with     Follow Up     rehab, heart       HISTORY:      HPI: Kartik is a 82 y.o. male who was seen secondary to his initial hospitalization January 19 January 22 secondary to tachybradycardia syndrome along with history of A. fib orthostatic hypotension and then was rehospitalized January 27 - January 30, 2018 secondary shortness of breath he was placed on prednisone.  He currently is doing great there is no edema does wear MARTINA hose.  Has taken a Tylenol.  Is able to use his 2 wheeled walker.  He has been normotensive afebrile and also on room air.  Denies any pain.  No nausea or vomiting.  Appetite improving.  Hoping there is a discharge later on this week otherwise states he is doing well.    Past Medical History:   Diagnosis Date     A-fib 12/05/2014     Bladder cancer      Bronchiectasis RLL      Cataract      CHF (congestive heart failure)      Chronic kidney disease      Colon polyp      Coronary artery disease      Dementia?      DJD (degenerative joint disease) hips      Hearing impaired + tinnitus      High cholesterol      History of transfusion      Hyperlipidemia      Hypertension     pt. denies     Hypothyroidism     hypo     Lung cancer      Lung nodule     RLL     Micturition syncope      Osteopenia      Pneumonitis     acute radiation     Pneumothorax      Pneumothorax, postoperative 11/10/2016     Pulmonary fibrosis      Shingles      Shortness of breath     SOB is his normal due to his lung disease             Family History   Problem Relation Age of Onset     Diabetes Mother      Heart disease Mother      No Medical Problems Father      Colon cancer Brother      Throat cancer Brother      Social History     Social History     Marital status:      Spouse name: N/A     Number of children: 3     Years of  education: N/A     Social History Main Topics     Smoking status: Former Smoker     Packs/day: 3.00     Years: 30.00     Quit date: 12/5/1979     Smokeless tobacco: Never Used     Alcohol use No      Comment: alcoholism in the past, quit drinking 1972      Drug use: No     Sexual activity: Not on file     Other Topics Concern     Not on file     Social History Narrative         Review of Systems  Currently denies chills fever coughing wheezing chest pain dizziness or vertigo nausea vomiting diarrhea dysuria.  History of A. fib hypothyroidism degenerative joint disease hypertension dyspnea bladder cancer heart failure    Current Outpatient Prescriptions   Medication Sig     acetaminophen (TYLENOL) 650 MG CR tablet Take 650 mg by mouth daily as needed for pain.     atorvastatin (LIPITOR) 20 MG tablet Take 1 tablet (20 mg total) by mouth at bedtime.     digoxin (LANOXIN) 125 mcg tablet Take 2 tablets (250 mcg total) by mouth daily.     fludrocortisone (FLORINEF) 0.1 mg tablet Take 1 tablet (0.1 mg total) by mouth daily.     levothyroxine (SYNTHROID, LEVOTHROID) 150 MCG tablet Take 150 mcg by mouth daily.     metoprolol succinate (TOPROL-XL) 25 MG Take 0.5 tablets (12.5 mg total) by mouth 2 (two) times a day.     omeprazole (PRILOSEC) 20 MG capsule Take 1 capsule (20 mg total) by mouth daily.     predniSONE (DELTASONE) 20 MG tablet Take 2 tablets (40 mg total) by mouth daily with breakfast.     warfarin (COUMADIN) 2.5 MG tablet Take 1.25-2.5 mg by mouth See Admin Instructions. Takes 1.25 mg on Sunday, Tuesday, and Thursday and 2.5 mg rest of the week       .There were no vitals filed for this visit.  Blood pressure 126/79 pulse 73 respirations 19 saturation room air 98%  Physical Exam   Constitutional: No distress.   HENT:   Head: Normocephalic.   Eyes: Pupils are equal, round, and reactive to light.   Neck: Neck supple. No thyromegaly present.   Cardiovascular:   Irregularity.  No edema.   Pacemaker.     Pulmonary/Chest: Breath sounds normal.   Abdominal: Bowel sounds are normal. There is no tenderness. There is no guarding.   Musculoskeletal:   Improving with his strength and balance.   Lymphadenopathy:     He has no cervical adenopathy.   Neurological: He is alert.   Skin: Skin is warm and dry. No rash noted.       LABS:   Lab Results   Component Value Date    WBC 12.9 (H) 01/27/2018    HGB 15.0 01/27/2018    HCT 44.6 01/27/2018    MCV 94 01/27/2018     01/27/2018     Results for orders placed or performed during the hospital encounter of 01/27/18   Basic metabolic panel   Result Value Ref Range    Sodium 137 136 - 145 mmol/L    Potassium 4.5 3.5 - 5.0 mmol/L    Chloride 103 98 - 107 mmol/L    CO2 20 (L) 22 - 31 mmol/L    Anion Gap, Calculation 14 5 - 18 mmol/L    Glucose 66 (L) 70 - 125 mg/dL    Calcium 8.9 8.5 - 10.5 mg/dL    BUN 27 8 - 28 mg/dL    Creatinine 1.08 0.70 - 1.30 mg/dL    GFR MDRD Af Amer >60 >60 mL/min/1.73m2    GFR MDRD Non Af Amer >60 >60 mL/min/1.73m2       No results found for: HGBA1C      ASSESSMENT:      ICD-10-CM    1. Pulmonary fibrosis J84.10    2. Permanent atrial fibrillation I48.2    3. Squamous cell lung cancer, right C34.91    4. Essential hypertension I10        PLAN:    Discontinue the Accu-Cheks.  Continue with Coumadin alternating 2 and 1 mg.  Continue to work with therapy.  Making excellent progress possible discharge later on this week.    Electronically signed by: Michael Duane Johnson, CNP

## 2021-06-15 NOTE — PROGRESS NOTES
[facility]    Facility:   [facility] [[id number]]   Code Status: FULL CODE      CHIEF COMPLAINT/REASON FOR VISIT:  Chief Complaint   Patient presents with     Follow Up     domonique monet, rehab, falls       HISTORY:      HPI: Kartik is a 82 y.o. male who I had a chance to visit with secondary to his most recent hospitalization January 19 January 22, 2018 secondary to tachybradycardia syndrome along with a history of A. fib orthostatic hypotension history of falls.As well as being treated for pneumonitis with Bactrim DS so therefore it is a good idea to continue to recheck on him to see how he is doing.  He currently has been well.  He does have a good sense of humor.  He does feel like he is making pretty good progress from a therapy standpoint.  He does see cardiology next Tuesday.  He is on Bactrim DS for his pneumonitis until February 1.  He also is on omeprazole daily due to the reflux and pneumonitis.  He also does have a digoxin level ordered for today he is at 250 mcg.  Does wear MARTINA hose.  His mood and attitude strength seem to be in pretty good shape but he still desires therapy to improve his overall conditioning.  Denies pain.  Denies any respiratory issues.    Past Medical History:   Diagnosis Date     A-fib 12/05/2014     Bladder cancer      Bronchiectasis RLL      Cataract      CHF (congestive heart failure)      Chronic kidney disease      Colon polyp      Coronary artery disease      Dementia?      DJD (degenerative joint disease) hips      Hearing impaired + tinnitus      High cholesterol      History of transfusion      Hyperlipidemia      Hypertension     pt. denies     Hypothyroidism     hypo     Lung cancer      Lung nodule     RLL     Micturition syncope      Osteopenia      Pneumonitis     acute radiation     Pneumothorax      Pneumothorax, postoperative 11/10/2016     Pulmonary fibrosis      Shingles      Shortness of breath     SOB is his normal due to  his lung disease             Family History   Problem Relation Age of Onset     Diabetes Mother      Heart disease Mother      No Medical Problems Father      Colon cancer Brother      Throat cancer Brother      Social History     Social History     Marital status:      Spouse name: N/A     Number of children: 3     Years of education: N/A     Social History Main Topics     Smoking status: Former Smoker     Packs/day: 3.00     Years: 30.00     Quit date: 12/5/1979     Smokeless tobacco: Never Used     Alcohol use No      Comment: alcoholism in the past, quit drinking 1972      Drug use: No     Sexual activity: Not on file     Other Topics Concern     Not on file     Social History Narrative         Review of Systems  Currently denies chills fever coughing wheezing chest pain dizziness or vertigo nausea vomiting diarrhea dysuria.  History of A. fib hypothyroidism degenerative joint disease hypertension dyspnea bladder cancer heart failure      Current Outpatient Prescriptions:      atorvastatin (LIPITOR) 20 MG tablet, Take 1 tablet (20 mg total) by mouth at bedtime., Disp: 90 tablet, Rfl: 3     digoxin (LANOXIN) 125 mcg tablet, Take 2 tablets (250 mcg total) by mouth daily., Disp: 90 tablet, Rfl: 3     levothyroxine (SYNTHROID, LEVOTHROID) 150 MCG tablet, Take 150 mcg by mouth daily., Disp: , Rfl:      metoprolol succinate (TOPROL-XL) 25 MG, Take 0.5 tablets (12.5 mg total) by mouth 2 (two) times a day., Disp: 30 tablet, Rfl: 0     omeprazole (PRILOSEC) 20 MG capsule, Take 1 capsule (20 mg total) by mouth daily., Disp: 90 capsule, Rfl: 3     predniSONE (DELTASONE) 20 MG tablet, Take 2 tablets daily x 1 week then 1.5 tablet daily x 1 week then resume 1 tablet daily, Disp: 38 tablet, Rfl: 0     sulfamethoxazole-trimethoprim (SEPTRA) 400-80 mg per tablet, Take 1 tablet by mouth daily for 10 days., Disp: 10 tablet, Rfl: 0     warfarin (COUMADIN) 2.5 MG tablet, Take 1.25-2.5 mg by mouth See Admin Instructions.  Takes 1.25 mg on Sunday, Tuesday, and Thursday and 2.5 mg rest of the week, Disp: , Rfl:     .There were no vitals filed for this visit.  Blood pressure 111/76 pulse 77 respirations 16 temperature 97.3  Physical Exam   Constitutional: No distress.   HENT:   Head: Normocephalic.   Eyes: Pupils are equal, round, and reactive to light.   Neck: Neck supple. No thyromegaly present.   Cardiovascular:   Irregularity.  No edema.   Pulmonary/Chest: Breath sounds normal.   Abdominal: Bowel sounds are normal. There is no tenderness. There is no guarding.   Musculoskeletal:   Improving with his strength and balance.   Lymphadenopathy:     He has no cervical adenopathy.   Neurological: He is alert.   Skin: Skin is warm and dry. No rash noted.         LABS:   Lab Results   Component Value Date    WBC 6.3 01/20/2018    HGB 13.0 (L) 01/20/2018    HCT 39.2 (L) 01/20/2018    MCV 96 01/20/2018     01/22/2018     Lab Results   Component Value Date    DIGOXIN 1.1 01/16/2018     Results for orders placed or performed during the hospital encounter of 01/19/18   Basic Metabolic Panel   Result Value Ref Range    Sodium 137 136 - 145 mmol/L    Potassium 4.5 3.5 - 5.0 mmol/L    Chloride 106 98 - 107 mmol/L    CO2 27 22 - 31 mmol/L    Anion Gap, Calculation 4 (L) 5 - 18 mmol/L    Glucose 90 70 - 125 mg/dL    Calcium 8.4 (L) 8.5 - 10.5 mg/dL    BUN 28 8 - 28 mg/dL    Creatinine 0.83 0.70 - 1.30 mg/dL    GFR MDRD Af Amer >60 >60 mL/min/1.73m2    GFR MDRD Non Af Amer >60 >60 mL/min/1.73m2           ASSESSMENT:      ICD-10-CM    1. Tachycardia-bradycardia I49.5    2. Pulmonary fibrosis J84.10    3. Persistent atrial fibrillation I48.1    4. Dyspnea on exertion R06.09        PLAN:    Overall his labs do look pretty good he does have a digoxin level ordered for today wait for those results.  Does see cardiology next Tuesday.  Continue to work with therapy monitor his blood pressures finish up the Bactrim on February 1.  Continue with the  omeprazole secondary to reflux.        Electronically signed by: Michael Duane Johnson, CNP

## 2021-06-15 NOTE — PROGRESS NOTES
Internal Medicine Office Visit  [facility]  Patient Name: Kartik Tam  Patient Age: 82 y.o.  YOB: 1935  MRN: [id number]    Date of Visit: 1/15/2018  Reason for Office Visit:   Chief Complaint   Patient presents with     Breathing Problem           Assessment / Plan / Medical Decision Makin. Weakness  2. Urine retention  3. Dyspnea on exertion  4. Pulmonary fibrosis  5. Shortness of breath  6. Squamous cell lung cancer, right  7. Skin tear of forearm without complication, left, initial encounter  - Will order home health to help with strengthening/gait training/home safety evaluation. Suspect that weakness and dizziness is related to shortness of breath which will be treated with steroid taper. He has been unable to maintain his usual active lifestyle and deconditioning is likely also contributing to progressive weakness.   - Home INR will be ordered to minimize trips to the clinic for blood work  - Restart prednisone taper with 40 mg x 1 week, 30 mg x 1 week then resume 20 mg daily thereafter as prescribed by pulmonology  - Use non-adherent pad and antibiotic ointment on left forearm skin tear  - Straight catheter twice daily. Keep urology follow up for later this week on   - Follow up in 2-3 weeks for recheck     Health Maintenance Review  Health Maintenance   Topic Date Due     FALL RISK ASSESSMENT  2017     ADVANCE DIRECTIVES DISCUSSED WITH PATIENT  2022     TD 18+ HE  2026     PNEUMOCOCCAL POLYSACCHARIDE VACCINE AGE 65 AND OVER  Completed     INFLUENZA VACCINE RULE BASED  Completed     PNEUMOCOCCAL CONJUGATE VACCINE FOR ADULTS (PCV13 OR PREVNAR)  Completed     ZOSTER VACCINE  Completed         I have changed Mr. Tam's predniSONE. I am also having him maintain his aspirin, digoxin, atorvastatin, omeprazole, furosemide, levothyroxine, metoprolol succinate, and warfarin.      HPI:  Kartik Tam is a 82 y.o. year old who  presents to the office today for concerns of a recent fall, weakness, and shortness of breath. He has a PMH of lung cancer and prior radiation pneumonitis, last radiation was now about 1 year ago.    Last Wednesday he had a bladder biopsy and he doesn't feel like he has been the same since the procedure. He returned home and had difficulty urinating eventually leading to an ER visit for catheterization for urinary retention with 2 L fluid. He also complained of shortness of breath which actually  Improves when he is lying flat. He was discharged to home with urology follow up. Friday he went to the urology visit and states that as he was walking into the clinic he felt like he was going to collapse and fell going into the building.  After someone helped him to his feet he was able to walk and continued on to his appointment.  He was not evaluated after the fall.  He has been able to urinate during the day although he does not feel that he is fully emptying his bladder.  He uses a straight catheter at night and typically removes 2-3 L from his bladder.    Saturday he was at home walking in their dark hallway and lost his balance explaining that he was reaching for the doorway which was not in front of him and fell.  He sustained a skin tear to the left forearm.  Throughout the weekend to now he has felt shortness of breath with exertion which has worsened from prior baseline.  He has a cough which seems to be loose and is occasionally productive of a clear phlegm. He has started to notice a discomfort in the gluteal area bilaterally.  He feels weak in his legs, the left seems worse than the right. No pain at night, the pain is brought on by sitting on non-supportive chairs.       Review of Systems- pertinent positive in bold:  Pertinent findings as in HPI     Current Scheduled Meds:  No facility-administered encounter medications on file as of 1/15/2018.      Outpatient Encounter Prescriptions as of 1/15/2018    Medication Sig Dispense Refill     aspirin 81 MG EC tablet Take 81 mg by mouth daily.       atorvastatin (LIPITOR) 20 MG tablet Take 1 tablet (20 mg total) by mouth at bedtime. 90 tablet 3     digoxin (LANOXIN) 125 mcg tablet Take 1 tablet (125 mcg total) by mouth every morning. 90 tablet 3     furosemide (LASIX) 40 MG tablet Take 1 tablet (40 mg total) by mouth daily. 90 tablet      levothyroxine (SYNTHROID, LEVOTHROID) 150 MCG tablet Take 150 mcg by mouth daily.       metoprolol succinate (TOPROL-XL) 25 MG Take 1 tablet (25 mg total) by mouth 2 (two) times a day. 60 tablet 0     omeprazole (PRILOSEC) 20 MG capsule Take 1 capsule (20 mg total) by mouth daily. 90 capsule 3     warfarin (COUMADIN) 2.5 MG tablet Take 1.25-2.5 mg by mouth See Admin Instructions. Takes 1.25 mg on Sunday, Tuesday, and Thursday and 2.5 mg rest of the week       [DISCONTINUED] oxyCODONE-acetaminophen (PERCOCET) 5-325 mg per tablet Take 1 tablet by mouth every 4 (four) hours as needed for pain. 10 tablet 0     predniSONE (DELTASONE) 20 MG tablet Take 2 tablets daily x 1 week then 1.5 tablet daily x 1 week then resume 1 tablet daily 38 tablet 0     [DISCONTINUED] predniSONE (DELTASONE) 20 MG tablet Take 20 mg by mouth daily.        Past Medical History:   Diagnosis Date     A-fib 12/05/2014     Bladder cancer      Bronchiectasis RLL      Cataract      CHF (congestive heart failure)      Chronic kidney disease      Colon polyp      Coronary artery disease      Dementia?      DJD (degenerative joint disease) hips      Hearing impaired + tinnitus      High cholesterol      History of transfusion      Hyperlipidemia      Hypertension     pt. denies     Hypothyroidism     hypo     Lung cancer      Lung nodule     RLL     Micturition syncope      Osteopenia      Pneumonitis     acute radiation     Pneumothorax      Pneumothorax, postoperative 11/10/2016     Pulmonary fibrosis      Shingles      Shortness of breath     SOB is his normal due to  his lung disease     Past Surgical History:   Procedure Laterality Date     APPENDECTOMY       Attempted lung  biopsy  04/19/2017     BLADDER SURGERY  2010     CARDIAC CATHETERIZATION  08/02/2016    stent x 1     CARDIAC ELECTROPHYSIOLOGY STUDY AND ABLATION       COLONOSCOPY       JOINT REPLACEMENT Bilateral     Hips     LUNG CANCER SURGERY  12/2016 and 01/2017    Cyber Knife-9 treatments     IL CYSTOURETHROSCOPY,BIOPSY N/A 1/10/2018    Procedure: CYSTOSCOPY, WITH BLADDER BIOPSY AND FULGURATION;  Surgeon: Gentry Sullivan MD;  Location: [facility];  Service: Urology     IL CYSTOURETHROSCOPY,FULGUR <0.5 CM LESN N/A 1/25/2016    Procedure: CYSTOSCOPY BLADDER BIOPSIES TRANSURETHRAL RESECTION BLADDER TUMOR;  Surgeon: Antoine Rodriguez MD;  Location: [facility];  Service: Urology     THORACOSCOPY Right 9/26/2016    Procedure:   RIGHT THORACOSCOPY/ RIGHT UPPER LOBE AND RIGHT LOWER LOBE WITH NEEDLE WIRE LOCALIZATION MULTIPLE WEDGE RESECTION NODE SAMPLING FIDUCIALS PLACEMENT;  Surgeon: Brandon Pavon MD;  Location: [facility];  Service:      THORACOSCOPY Right 9/26/2016    Procedure: THORACOSCOPY FOR CONTROL POST OPERATIVE BLEED;  Surgeon: Brandon Pavon MD;  Location: [facility];  Service:      Social History   Substance Use Topics     Smoking status: Former Smoker     Packs/day: 3.00     Years: 30.00     Quit date: 12/5/1979     Smokeless tobacco: Never Used     Alcohol use No      Comment: alcoholism in the past, quit drinking 1972        Objective / Physical Examination:  Vitals:    01/15/18 1108   BP: 124/70   Pulse: 84   Resp: 24   SpO2: 98%   Weight: 164 lb (74.4 kg)     Wt Readings from Last 3 Encounters:   01/17/18 159 lb 12.8 oz (72.5 kg)   01/15/18 164 lb (74.4 kg)   01/10/18 164 lb (74.4 kg)     Body mass index is 22.87 kg/(m^2).     General Appearance: Alert and oriented, cooperative, affect appropriate, speech clear, in no apparent distress  Lungs: Clear to auscultation  bilaterally. Tachypnea, increased work of breathing but not in distress   Cardiovascular: Regular rate, normal S1, S2. No murmurs, rubs, or gallops  Extremities: Radial pulses 2+ and equal throughout. Acyanotic   Integumentary: Warm and dry. Left forearm with two skin tears. No signs of infection     Orders Placed This Encounter   Procedures     Ambulatory referral to Home Health   Followup: Return in about 3 weeks (around 2/5/2018) for Recheck. earlier if needed.        Michelle Tello, CNP

## 2021-06-15 NOTE — PROGRESS NOTES
[facility]      Facility:    [facility] [[id number]]  Code Status: UNKNOWN      Chief Complaint/Reason for Visit:  Chief Complaint   Patient presents with     H & P     Tachybradycardia syndrome with status post pacemaker, fall, squamous cell carcinoma with status post radiation and radiation pneumonitis, atrial fibrillation, anticoagulation management, chronic shortness of breath,       HPI:   Kartik is a 82 y.o. male who has a past medical history of squamous cell carcinoma and is status post radiation treatment with a history of radiation pneumonitis.  He is currently on steroids at this time and he does have persistent atrial fibrillation is anticoagulated with chronic diastolic congestive heart failure.  Patient also has chronic shortness of breath and was then admitted to the hospital on 1/19/2018 with worsening shortness of breath.  He did have an episode of a fall and he was worked up very thoroughly.  CT scan of the chest was negative for pulmonary embolism however showed progression of his fibrotic changes and bronchiectasis.  Pulmonology and oncology saw the patient and increased prednisone dose.  He was also started on prophylactic Bactrim and he does have pulmonology follow-up.  He did have dizziness and frequent falls likely to dehydration and he also had tachycardia/bradycardia syndrome in the hospital and was transferred to [facility] for pacer placement.  His Lasix was discontinued in the hospital secondary to hypotension metoprolol dose was decreased.  Digoxin was resumed for heart rate control and aspirin was discontinued by cardiology.  Patient is also on Coumadin at this time.  They did increase the metoprolol to to 12.5 mg twice daily and he was treated appropriately and transferred here to the TCU at [facility] in stable condition.    Patient claims today that he is always short of breath however his INR today is 2.5 on the  current dose we will continue the same dose.  He says he is doing much better than was in the hospital denies any dizziness.  He claims that his lung cancer is in remission after the radiation CyberKnife treatment and he feels okay at this time.  The pacemaker placement went without any complications and he is doing fine at this time and denies any new issues.    Past Medical History:  Past Medical History:   Diagnosis Date     A-fib 12/05/2014     Bladder cancer      Bronchiectasis RLL      Cataract      CHF (congestive heart failure)      Chronic kidney disease      Colon polyp      Coronary artery disease      Dementia?      DJD (degenerative joint disease) hips      Hearing impaired + tinnitus      High cholesterol      History of transfusion      Hyperlipidemia      Hypertension     pt. denies     Hypothyroidism     hypo     Lung cancer      Lung nodule     RLL     Micturition syncope      Osteopenia      Pneumonitis     acute radiation     Pneumothorax      Pneumothorax, postoperative 11/10/2016     Pulmonary fibrosis      Shingles      Shortness of breath     SOB is his normal due to his lung disease           Surgical History:  Past Surgical History:   Procedure Laterality Date     APPENDECTOMY       Attempted lung  biopsy  04/19/2017     BLADDER SURGERY  2010     CARDIAC CATHETERIZATION  08/02/2016    stent x 1     CARDIAC ELECTROPHYSIOLOGY STUDY AND ABLATION       COLONOSCOPY       EP PACEMAKER INSERT N/A 1/19/2018    Procedure: EP Pacemaker Insertion;  Surgeon: Lewis Poole MD;  Location: [facility];  Service:      JOINT REPLACEMENT Bilateral     Hips     LUNG CANCER SURGERY  12/2016 and 01/2017    Cyber Knife-9 treatments     HI CYSTOURETHROSCOPY,BIOPSY N/A 1/10/2018    Procedure: CYSTOSCOPY, WITH BLADDER BIOPSY AND FULGURATION;  Surgeon: Gentry Sullivan MD;  [place] [facility]  Service: Urology     HI CYSTOURETHROSCOPY,FULGUR <0.5 CM LESN N/A 1/25/2016    Procedure: CYSTOSCOPY  BLADDER BIOPSIES TRANSURETHRAL RESECTION BLADDER TUMOR;  Surgeon: Antoine Rodriguez MD;  [place] [facility]     THORACOSCOPY Right 9/26/2016    Procedure:   RIGHT THORACOSCOPY/ RIGHT UPPER LOBE AND RIGHT LOWER LOBE WITH NEEDLE WIRE LOCALIZATION MULTIPLE WEDGE RESECTION NODE SAMPLING FIDUCIALS PLACEMENT;  Surgeon: Brandon Pavon MD;  Location: [facility];  Service:      THORACOSCOPY Right 9/26/2016    Procedure: THORACOSCOPY FOR CONTROL POST OPERATIVE BLEED;  Surgeon: Brandon Pavon MD;  Location: [facility];  Service:        Family History:   Family History   Problem Relation Age of Onset     Diabetes Mother      Heart disease Mother      No Medical Problems Father      Colon cancer Brother      Throat cancer Brother        Social History:    Social History     Social History     Marital status:      Spouse name: N/A     Number of children: 3     Years of education: N/A     Social History Main Topics     Smoking status: Former Smoker     Packs/day: 3.00     Years: 30.00     Quit date: 12/5/1979     Smokeless tobacco: Never Used     Alcohol use No      Comment: alcoholism in the past, quit drinking 1972      Drug use: No     Sexual activity: Not Asked     Other Topics Concern     None     Social History Narrative          Review of Systems   Constitutional:        Patient denies any pain fevers chills nausea vomiting diarrhea change in vision hearing taste or smell weakness one side the other chest pain.  He does have shortness of breath all the time and is around his baseline at this time.  He does not have any lower extremity edema he is walking without difficulty.  He is moving his bowels and does not claim to have any urgency frequency or burning with urination polyphagia or polydipsia polyuria depression or anxiety and the remainder of the review of systems is negative.       Vitals:    01/24/18 0733   BP: 133/90   Pulse: 89   Resp: 19   Temp: 97.3  F (36.3   C)   SpO2: 96%       Physical Exam   Constitutional: No distress.   HENT:   Head: Normocephalic and atraumatic.   Nose: Nose normal.   Eyes: Right eye exhibits no discharge. Left eye exhibits no discharge. No scleral icterus.   Neck: Neck supple. No thyromegaly present.   Cardiovascular:   Patient's heart sounds are irregularly irregular with adequate rate control.   Pulmonary/Chest: Effort normal. He has no wheezes. He has no rales.   Patient does have fine crackles over the lower lung fields.  But his slight decreased inspiratory volume.   Abdominal: Soft. Bowel sounds are normal. He exhibits no distension. There is no tenderness. There is no rebound.   Musculoskeletal: He exhibits no edema or tenderness.   Neurological: He is alert. No cranial nerve deficit. He exhibits normal muscle tone.   Skin: Skin is warm and dry. He is not diaphoretic.   Psychiatric: He has a normal mood and affect. His behavior is normal.       Medication List:  Current Outpatient Prescriptions   Medication Sig     atorvastatin (LIPITOR) 20 MG tablet Take 1 tablet (20 mg total) by mouth at bedtime.     digoxin (LANOXIN) 125 mcg tablet Take 2 tablets (250 mcg total) by mouth daily.     levothyroxine (SYNTHROID, LEVOTHROID) 150 MCG tablet Take 150 mcg by mouth daily.     metoprolol succinate (TOPROL-XL) 25 MG Take 0.5 tablets (12.5 mg total) by mouth 2 (two) times a day.     omeprazole (PRILOSEC) 20 MG capsule Take 1 capsule (20 mg total) by mouth daily.     predniSONE (DELTASONE) 20 MG tablet Take 2 tablets daily x 1 week then 1.5 tablet daily x 1 week then resume 1 tablet daily     sulfamethoxazole-trimethoprim (SEPTRA) 400-80 mg per tablet Take 1 tablet by mouth daily for 10 days.     warfarin (COUMADIN) 2.5 MG tablet Take 1.25-2.5 mg by mouth See Admin Instructions. Takes 1.25 mg on Sunday, Tuesday, and Thursday and 2.5 mg rest of the week       Labs:      Assessment:    ICD-10-CM    1. Status post placement of cardiac pacemaker Z95.0     2. Fall W19.XXXA    3. Atrial fibrillation I48.91    4. Anticoagulation management encounter Z51.81     Z79.01    5. Hypertension I10    6. 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  His blood pressure medications went will not be adjusted today I did review his blood pressures and they were okay.  He still is in atrial fibrillation but his rate is in good control and the pacemaker site looks fine.  The been no other changes to care plan at this time I will continue to monitor above medical problems.        Electronically signed by: Juan Jose Lucia DO

## 2021-06-16 PROBLEM — R31.9 HEMATURIA: Status: ACTIVE | Noted: 2018-01-01

## 2021-06-16 PROBLEM — R06.02 SHORTNESS OF BREATH: Status: ACTIVE | Noted: 2018-01-01

## 2021-06-16 PROBLEM — I49.5 TACHYCARDIA-BRADYCARDIA SYNDROME (H): Status: ACTIVE | Noted: 2018-01-01

## 2021-06-16 PROBLEM — C67.9 BLADDER CANCER (H): Status: ACTIVE | Noted: 2017-01-01

## 2021-06-16 PROBLEM — R07.9 CHEST PAIN: Status: ACTIVE | Noted: 2017-01-01

## 2021-06-16 NOTE — PROGRESS NOTES
Internal Medicine Office Visit  [facility]  Patient Name: Kartik Tam  Patient Age: 82 y.o.  YOB: 1935  MRN: [id number]    Date of Visit: 2/15/2018  Reason for Office Visit:   Chief Complaint   Patient presents with     Hospital Visit Follow Up           Assessment / Plan / Medical Decision Makin. NASH (dyspnea on exertion)  2. Persistent atrial fibrillation  3. Tachycardia-bradycardia syndrome  4. Squamous cell lung cancer, right  5. Permanent atrial fibrillation  6. Pulmonary fibrosis  7. Coronary artery disease involving native coronary artery of native heart without angina pectoris  8. Chronic diastolic CHF (congestive heart failure), NYHA class 3  - Reviewed inpatient notes, labs and imaging. Reviewed TCU notes  - Continue home health for medication assistance, strengthening exercises and home safety, home INR checks. He is awaiting [employer] home INR monitor which was ordered about 4 weeks ago and has received information about this but has not had home education yet   - Continue current medications, refills sent in today. Per pulmonology he is to continue prophylactic Bactrim until he is take prednisone 10 mg daily-- Bactrim reordered  - He will monitor weights carefully. Call if weight gain meets or exceeds 5 lbs and will need to use diuretic with caution due to history of orthostatic hypotension  - Will complete form for [employer] so he can attend appointments when ride not available  - Keep follow up with urology/oncology/cardiology as planned  - Follow up in office in 6 months, sooner if needed       Health Maintenance Review  Health Maintenance   Topic Date Due     FALL RISK ASSESSMENT  2017     ADVANCE DIRECTIVES DISCUSSED WITH PATIENT  2022     TD 18+ HE  2026     PNEUMOCOCCAL POLYSACCHARIDE VACCINE AGE 65 AND OVER  Completed     INFLUENZA VACCINE RULE BASED  Completed     PNEUMOCOCCAL CONJUGATE VACCINE FOR ADULTS (PCV13  OR PREVNAR)  Completed     ZOSTER VACCINE  Completed         I have discontinued Mr. Tam's warfarin. I have also changed his digoxin. Additionally, I am having him start on warfarin and sulfamethoxazole-trimethoprim. Lastly, I am having him maintain his atorvastatin, omeprazole, levothyroxine, acetaminophen, predniSONE, albuterol, fludrocortisone, and metoprolol succinate.      HPI:  Kartik Tam is a 82 y.o. year old male who presents to the office today for follow-up of recent hospitalizations and TCU stay.  He initially presented to the emergency room with redness of breath and dizziness with recurrent falls on 1/16.  Shortness of breath was thought to be due to progression of fibrotic changes and traction bronchiectasis in bilateral lungs.  He was given Bactrim prophylaxis with an increase in prednisone to 40 mg daily.      He has a history of persistent atrial fibrillation and was noted to have tachybradycardia with 2-3 second pauses. His digoxin was discontinued and he was transferred to [facility] for permanent pacemaker placement. After the pacemaker placement digoxin was restarted at 250 mg daily for heart rate control and metoprolol dose was increased to 12.5 mg twice daily. He has not been able to feel his heart racing.     He had hypotension which was thought to be related to hypovolemia so his furosemide was decreased to 1/2 dose and beta blocker dose reduced. Discharged from the hospital on 1/22 to TCU. On 1/27 he was readmitted to the hospital with shortness of breath and general weakness. A prednisone taper was started and he was started on Florinef and MARTINA hose/fluids were encouraged due to orthostatic hypotension. He was discharged back to TCU on 1/30 where he stayed until 2/9 when he was discharged to home with home health.     Pulmonology appointment was reviewed from 2/13. He was given a taper of prednisone 20 mg daily x 4 weeks then reduce to 10 mg daily thereafter. He had a  cardiology visit on 2/14. He continues to have tachycardia and tendency to heart failure so electrophysiology is considered for AV juhi ablation.     Orthostatic hypotension controlled with florinef and he denies symptoms of lightheadedness/dizziness since starting this medication. He has noted edema in his lower extremities which he never had in the past when taking furosemide and restricted fluid intake. He has gained 3 lb over the time since he returned home from the TCU.     Review of Systems- pertinent positive in bold:  Negative for chest pain. Shortness of breath at baseline for patient. Denies pain      Current Scheduled Meds:  Outpatient Encounter Prescriptions as of 2/15/2018   Medication Sig Dispense Refill     acetaminophen (TYLENOL) 650 MG CR tablet Take 650 mg by mouth daily as needed for pain.       albuterol (PROAIR HFA;PROVENTIL HFA;VENTOLIN HFA) 90 mcg/actuation inhaler Inhale 2 puffs every 6 (six) hours as needed.       atorvastatin (LIPITOR) 20 MG tablet Take 1 tablet (20 mg total) by mouth at bedtime. 90 tablet 3     digoxin (LANOXIN) 250 mcg tablet Take 1 tablet (250 mcg total) by mouth daily. 90 tablet 1     fludrocortisone (FLORINEF) 0.1 mg tablet Take 1 tablet (0.1 mg total) by mouth daily. 90 tablet 0     levothyroxine (SYNTHROID, LEVOTHROID) 150 MCG tablet Take 150 mcg by mouth daily.       metoprolol succinate (TOPROL-XL) 25 MG Take 0.5 tablets (12.5 mg total) by mouth 2 (two) times a day. 90 tablet 3     omeprazole (PRILOSEC) 20 MG capsule Take 1 capsule (20 mg total) by mouth daily. 90 capsule 3     predniSONE (DELTASONE) 10 mg tablet Take 20 mg (two tablets) daily for 4 weeks then down to 10 mg daily 60 tablet 6     [DISCONTINUED] digoxin (LANOXIN) 250 mcg tablet 1 tablet daily.       [DISCONTINUED] fludrocortisone (FLORINEF) 0.1 mg tablet Take 1 tablet (0.1 mg total) by mouth daily. 30 tablet 0     [DISCONTINUED] metoprolol succinate (TOPROL-XL) 25 MG Take 0.5 tablets (12.5 mg total)  by mouth 2 (two) times a day. 30 tablet 0     [DISCONTINUED] predniSONE (DELTASONE) 20 MG tablet Take 2 tablets (40 mg total) by mouth daily with breakfast. 7 tablet 0     [DISCONTINUED] warfarin (COUMADIN) 2.5 MG tablet Take 0.5-1 tablets (1.25-2.5 mg total) by mouth See Admin Instructions. Takes 1.25 mg on Sun, Tue, Thur, and 2.5 mg rest of the week 100 tablet 0     sulfamethoxazole-trimethoprim (SEPTRA) 400-80 mg per tablet Take 1 tablet by mouth 2 (two) times a day. 30 tablet 0     warfarin (COUMADIN) 1 MG tablet Take 1 to 3 tablets by mouth daily. Adjust dose based on INR results as directed. 90 tablet 2     No facility-administered encounter medications on file as of 2/15/2018.      Past Medical History:   Diagnosis Date     A-fib 12/05/2014     Bladder cancer      Bronchiectasis RLL      Cataract      CHF (congestive heart failure)      Chronic kidney disease      Colon polyp      Coronary artery disease      Dementia?      DJD (degenerative joint disease) hips      Hearing impaired + tinnitus      High cholesterol      History of transfusion      Hyperlipidemia      Hypertension     pt. denies     Hypothyroidism     hypo     Lung cancer      Lung nodule     RLL     Micturition syncope      Osteopenia      Pneumonitis     acute radiation     Pneumothorax      Pneumothorax, postoperative 11/10/2016     Pulmonary fibrosis      Shingles      Shortness of breath     SOB is his normal due to his lung disease     Past Surgical History:   Procedure Laterality Date     APPENDECTOMY       Attempted lung  biopsy  04/19/2017     BLADDER SURGERY  2010     CARDIAC CATHETERIZATION  08/02/2016    stent x 1     CARDIAC ELECTROPHYSIOLOGY STUDY AND ABLATION       COLONOSCOPY       EP PACEMAKER INSERT N/A 1/19/2018    Procedure: EP Pacemaker Insertion;  Surgeon: Lewis Poole MD;  Location: [facility];  Service:      JOINT REPLACEMENT Bilateral     Hips     LUNG CANCER SURGERY  12/2016 and 01/2017    Cyber Knife-9  treatments     OH CYSTOURETHROSCOPY,BIOPSY N/A 1/10/2018    Procedure: CYSTOSCOPY, WITH BLADDER BIOPSY AND FULGURATION;  Surgeon: Gentry Sullivan MD;  Location: [facility];  Service: Urology     OH CYSTOURETHROSCOPY,FULGUR <0.5 CM LESN N/A 1/25/2016    Procedure: CYSTOSCOPY BLADDER BIOPSIES TRANSURETHRAL RESECTION BLADDER TUMOR;  Surgeon: Antoine Rodriguez MD;  Location: [facility];  Service: Urology     THORACOSCOPY Right 9/26/2016    Procedure:   RIGHT THORACOSCOPY/ RIGHT UPPER LOBE AND RIGHT LOWER LOBE WITH NEEDLE WIRE LOCALIZATION MULTIPLE WEDGE RESECTION NODE SAMPLING FIDUCIALS PLACEMENT;  Surgeon: Brandon Pavon MD;  Location: [facility];  Service:      THORACOSCOPY Right 9/26/2016    Procedure: THORACOSCOPY FOR CONTROL POST OPERATIVE BLEED;  Surgeon: Brandon Pavon MD;  Location: [facility];  Service:      Social History   Substance Use Topics     Smoking status: Former Smoker     Packs/day: 3.00     Years: 30.00     Quit date: 12/5/1979     Smokeless tobacco: Never Used     Alcohol use No      Comment: alcoholism in the past, quit drinking 1972        Objective / Physical Examination:  Vitals:    02/15/18 1435   BP: 120/64   Pulse: 70   Weight: 174 lb (78.9 kg)     Wt Readings from Last 3 Encounters:   02/15/18 174 lb (78.9 kg)   02/14/18 174 lb (78.9 kg)   02/13/18 172 lb 6.4 oz (78.2 kg)     Body mass index is 24.27 kg/(m^2).     General Appearance: Alert and oriented, cooperative, affect appropriate, speech clear, in no apparent distress  Lungs: Clear to auscultation bilaterally, no wheezes or crackles. Normal inspiratory and expiratory effort. Tachypneic with minimal exertion.   Cardiovascular: Irregular rate, normal S1, S2. No murmurs, rubs, or gallops  Abdomen: Bowel sounds active all four quadrants. Soft, non-tender. No hepatomegaly or splenomegaly. No bruits detected.   Extremities: Pulses 2+ and equal throughout. 1+ edema R>L       No orders of the defined  types were placed in this encounter.  Followup: Return in about 6 months (around 8/15/2018) for Next scheduled follow up. earlier if needed.      Michelle Tello, CNP

## 2021-06-16 NOTE — PROGRESS NOTES
[facility] Follow-up Note    Assessment & Plan        1. Permanent atrial fibrillation --asymptomatic, not valvular and permanent. He did not tolerate Multaq or amiodarone due to lung issues as well as sotalol causing QT prolongation. Since it is essentially asymptomatic we will let him have the atrial fibrillation with anticoagulation and rate control with metoprolol and digoxin, digoxin blood level was normal. INR 2.20 adjusted by primary.   2. Tachycardia-bradycardia syndrome -this prompted implantation of pacemaker [employer] device with [employer] lead.  Chest x-ray showed no pneumothorax and the device functions properly.  Our technicians will contact family to make sure there are latitude is set up properly at home.   3. Squamous cell lung cancer, right -mass upper lobe left lung with bilateral involvement, stage IIIB and status post CyberKnife treatments and defer to hematology oncology.   4. Pure hypercholesterolemia -cholesterol 155 with LDL of 78 which is acceptable given history of coronary artery disease.   5. Pulmonary fibrosis -defer to pulmonary and on tapering steroid dose.   6. Orthostatic hypotension -resolved with Florinef although concerned about tachycardia and tendency to heart failure and will discuss with electrophysiology possible AV juhi ablation.   7. Heart failure with preserved ejection fraction -no signs or symptoms on exam currently.   8. Coronary artery disease involving native coronary artery of native heart without angina pectoris --angiography August 2 2016 showed normal left main, left anterior descending with a mid 80% stenosis which received a synergy drug-coated stent, normal circumflex and normal right coronary artery.  Stress test performed during hospitalization 2017 showed no scar, no ischemia, and preserved ejection fraction greater than 70%.    No symptoms so continue current therapy     Plan  1.  We will discuss with the EP possible AV juhi  "ablation considering the fact the patient is on digoxin and metoprolol still with tachycardia, and now on Florinef to support blood pressure in a gentleman who has had history of heart failure in past.  2.  Await device check in 2 weeks remotely.  3.  Follow-up with me in 3-4 months or sooner if needed.    Subjective  CC: 82-year-old white gentleman being seen in post hospital discharge follow-up.  He is back home, getting some physical therapy.  Does have some bipedal ankle edema due to steroids as well as Florinef.  There is still shortness of breath on moderate activity.  There is no PND, orthopnea, chest discomfort, obvious palpitations, syncope, dizziness or falls.    Medications  Current Outpatient Prescriptions   Medication Sig     acetaminophen (TYLENOL) 650 MG CR tablet Take 650 mg by mouth daily as needed for pain.     atorvastatin (LIPITOR) 20 MG tablet Take 1 tablet (20 mg total) by mouth at bedtime.     digoxin (LANOXIN) 250 mcg tablet 1 tablet daily.     fludrocortisone (FLORINEF) 0.1 mg tablet Take 1 tablet (0.1 mg total) by mouth daily.     levothyroxine (SYNTHROID, LEVOTHROID) 150 MCG tablet Take 150 mcg by mouth daily.     metoprolol succinate (TOPROL-XL) 25 MG Take 0.5 tablets (12.5 mg total) by mouth 2 (two) times a day.     omeprazole (PRILOSEC) 20 MG capsule Take 1 capsule (20 mg total) by mouth daily.     predniSONE (DELTASONE) 10 mg tablet Take 20 mg (two tablets) daily for 4 weeks then down to 10 mg daily     predniSONE (DELTASONE) 20 MG tablet Take 2 tablets (40 mg total) by mouth daily with breakfast.     warfarin (COUMADIN) 2.5 MG tablet Take 0.5-1 tablets (1.25-2.5 mg total) by mouth See Admin Instructions. Takes 1.25 mg on Sun, Tue, Thur, and 2.5 mg rest of the week       Objective  /68 (Patient Site: Right Arm, Patient Position: Sitting, Cuff Size: Adult Regular)  Pulse 64  Resp 16  Ht 5' 11\" (1.803 m)  Wt 174 lb (78.9 kg)  BMI 24.27 kg/m2    General Appearance:    Alert, " cooperative, no distress, appears stated age   Head:    Normocephalic, without obvious abnormality, atraumatic   Throat:   Lips, mucosa, and tongue normal; teeth and gums normal   Neck:   Supple, symmetrical, trachea midline, no adenopathy;        thyroid:  No enlargement/tenderness/nodules; no carotid    bruit or JVD   Back:     Symmetric, no curvature, ROM normal, no CVA tenderness   Lungs:     Clear to auscultation bilaterally, respirations unlabored   Chest wall:    No tenderness, left-sided pacemaker noted   Heart:    Regular rate and rhythm, S1 and S2 normal, no murmur, rub   or gallop   Abdomen:     Soft, non-tender, bowel sounds active all four quadrants,     no masses, no organomegaly   Extremities:   Normal, atraumatic, no cyanosis, mild bilateral ankle edema   Pulses:   2+ and symmetric all extremities   Skin:   Skin color, texture, turgor normal, no rashes or lesions     Results    Lab Results personally reviewed   Lab Results   Component Value Date    CHOL 155 11/16/2016    CHOL 220 (H) 10/13/2010     Lab Results   Component Value Date    HDL 62 11/16/2016    HDL 49 10/13/2010     Lab Results   Component Value Date    LDLCALC 78 11/16/2016    LDLCALC 134 (H) 10/13/2010     Lab Results   Component Value Date    TRIG 74 11/16/2016    TRIG 187 (H) 10/13/2010     Lab Results   Component Value Date    WBC 12.9 (H) 01/27/2018    HGB 15.0 01/27/2018    HCT 44.6 01/27/2018     01/27/2018     Lab Results   Component Value Date    CREATININE 1.08 01/27/2018    BUN 27 01/27/2018     01/27/2018    K 4.5 01/27/2018    CO2 20 (L) 01/27/2018     Review of Systems:   General: Weight Gain  Eyes: Visual Distubance  Ears/Nose/Throat: WNL  Lungs: Cough, Shortness of Breath, Wheezing  Heart: Shortness of Breath with activity, Irregular Heartbeat, Leg Swelling  Stomach: WNL  Bladder: WNL  Muscle/Joints: Muscle Weakness  Skin: WNL  Nervous System: Falls, Daytime Sleepiness  Mental Health: WNL     Blood: Easy  Bruising

## 2021-06-16 NOTE — PROGRESS NOTES
PULMONARY OUTPATIENT FOLLOW UP NOTE    Assessment:      1. Radiation induced pneumonitis  Slowly taper down steroids, currently on prednisone 30 mg daily, plan to decrease to 20 mg daily for one month then down to continuous 10 mg daily, continue bactrim for PCP prophylaxis while on prednisone over 10 mg daily.   2. Pulmonary fibrosis  Lung biopsy was positive for usual interstitial pneumonia. Labs showed (+) TETE, elevated anti Sm antibody.   3. Dysphagia  Video swallow study and speech therapy evaluation   Chin tuck maneuver. Diet per speech therapist recommendation.   4. Lung cancer  PET 1/15/04911 (+) RLL mass, RUL and LLL nodules. (+) mediastinal adenopathy.   S/p EBUS with negative pathology results.   S/p wedge biopsy of RLL mass and RUL nodule, path (+) Squamous cell lung cancer. Diagnosed with stage IIIA. Finished stereotactic radiosurgery RUL and RLL December 2016.   Enlarging peripheral lingular nodular lesion. S/p CT guided biopsy 4/2017 (+)  Squamous cell carcinoma. S/p Cyberknife therapy May 2017  Schedule for chest CT scan 4/2/2018.   5. Carcinoma in situ of bladder   6. Ischemic cardiomyopathy s/p drug eluting stent Synergy proximal LAD on 8/2/2016  7. Paroxysmal atrial fibrillation anticoagulated  8. Tachy-chiki syndrome s/p pacemaker  9. Hx hypothyroidism     Plan:   1. Continue prednisone 20 mg daily for 4 weeks then down to 10 mg daily  2. Continue bactrim 400/80 daily  3. Albuterol HFA / nebulizer as needed  4. Chin tuck maneuver  5. Continue prilosec daily  6. Continue physical therapy   7. Follow up chest CT scan 4/2018  8. Follow up in 2 months      Chad Pham  Pulmonary / Critical Care  2/13/2018    CC:      Chief Complaint   Patient presents with     Follow Up     go over CT     Hospital Visit Follow Up     pretty breathless when ever moving around at all       HPI:       Kartik Tam is an 82 y.o. male who presents for follow up.  Pt has history of ischemic cardiomyopathy  s/p stent proximal LAD 8/2/2016, atrial fibrillation on coumadin, hypothyroidism, carcinomal in situ bladder of bladder, squamous cell lung cancer stage IIIA s/p radiation therapy RUL and RLL lesion, squamous cell lung cancer s/p Cyberknife therapy, pulmonary fibrosis, biopsy UIP, on treatment for radiation pneumonitis.   Patient was seen in three opportunities in ED for worsening shortness of breath, treated for pneumonia and decompensated cardiomyopathy, also his steroid dose was increased, cardiology service consulted, diagnosed with tachy-chiki syndrome, underwent pacemaker placement. Patient had problems tolerating diuretics due to hypotension and dizzy spells. Started on florinef. Diuresis was discontinued.  Discharged to nursing home.  Activity level has declined, able to walk slowly less than a 1/2 block, uses a walker to get around. Mild dry cough.   Reports swelling of LEs. Denies orthopnea or PND. Denies fever or chills.    INR is therapeutic.   Denies acid reflux symptoms while taking PPI. No postnasal drip.   Pt reports previous tobacco user, 1-2 ppd for 30 years, quit 35 years ago. Worked in sales.  Family hx significant for two brothers with malignancy, one with throat cancer and other with colon cancer.     Past Medical History:   Diagnosis Date     A-fib 12/05/2014     Bladder cancer      Bronchiectasis RLL      Cataract      CHF (congestive heart failure)      Chronic kidney disease      Colon polyp      Coronary artery disease      Dementia?      DJD (degenerative joint disease) hips      Hearing impaired + tinnitus      High cholesterol      History of transfusion      Hyperlipidemia      Hypertension     pt. denies     Hypothyroidism     hypo     Lung cancer      Lung nodule     RLL     Micturition syncope      Osteopenia      Pneumonitis     acute radiation     Pneumothorax      Pneumothorax, postoperative 11/10/2016     Pulmonary fibrosis      Shingles      Shortness of breath     SOB is his  normal due to his lung disease     Medications:     Prior to Admission medications    Medication Sig Start Date End Date Taking? Authorizing Provider   dronedarone (MULTAQ) 400 mg tablet Take 1 tablet (400 mg total) by mouth daily. 10/12/15  Yes Agnes Hicks MD   LEVOTHYROXINE SODIUM (LEVOTHYROXINE, BULK, MISC) Take 150 mcg by mouth every morning.    Yes Historical Provider, MD   simvastatin (ZOCOR) 20 MG tablet Take 20 mg by mouth bedtime.   Yes Historical Provider, MD   warfarin (COUMADIN) 5 MG tablet Please take 5 g on 12/6/2014 and 12/7/2014. 12/6/14 12/6/15 Yes Ilana Jarrett MD   doxycycline (VIBRAMYCIN) 100 MG capsule Take 1 capsule (100 mg total) by mouth 2 (two) times a day. 12/1/15 12/8/15  Chad Pham MD     Social History     Social History     Marital status:      Spouse name: N/A     Number of children: 3     Years of education: N/A     Occupational History     Not on file.     Social History Main Topics     Smoking status: Former Smoker     Packs/day: 3.00     Years: 30.00     Quit date: 12/5/1979     Smokeless tobacco: Never Used     Alcohol use No      Comment: alcoholism in the past, quit drinking 1972      Drug use: No     Sexual activity: Not on file     Other Topics Concern     Not on file     Social History Narrative     Family History   Problem Relation Age of Onset     Diabetes Mother      Heart disease Mother      No Medical Problems Father      Colon cancer Brother      Throat cancer Brother      Review of Systems  A 12 point comprehensive review of systems was negative except as noted.      Objective:     Vitals:    02/13/18 1002   BP: 108/68   Pulse: 80   Resp: 28   SpO2: 99%   Weight: 172 lb 6.4 oz (78.2 kg)   SpO2 at rest on RAis 96%.   SpO2 after ambulating 300ft on RA is 89-90%.    Gen: awake, alert, no distress  HEENT: pink conjunctiva, moist mucosa, Mallampati II/IV  Neck: no thyromegaly, masses or JVD  Lungs:  inspiratory crackles both bases  CV:  regular, no murmurs or gallops appreciated  Abdomen: soft, NT, BS wnl  Ext: edema 2+  Neuro: CN II-XII intact, non focal      Diagnostic tests:     LABS    TETE (+)  RF negative  Smith autoantibodies (+)  Aspergillus fumigatus IgG (+)    BAL cell count Neutrophils 36%, Lymphocytes 8%, Macrophages 50% Eosinophils 6%  BAL cultures negative for fungal/mycobacterial infection.      BRONCHOSCOPY / EBUS - CYTOLOGY 1/22/2016   A) LYMPH NODE, 4R, ENDOBRONCHIAL ULTRASOUND-GUIDED FINE NEEDLE      ASPIRATION FOR CYTOLOGIC EXAM:       - LYMPHOID TISSUE PRESENT, NEGATIVE FOR TUMOR     B) LYMPH NODE, SUBCARINAL, ENDOBRONCHIAL ULTRASOUND-GUIDED FINE      NEEDLE ASPIRATION FOR CYTOLOGIC EXAM:       - LYMPHOID TISSUE PRESENT, NEGATIVE FOR TUMOR     C) LYMPH NODE, 11R, ENDOBRONCHIAL ULTRASOUND-GUIDED FINE NEEDLE      ASPIRATION FOR CYTOLOGIC EXAM:       - SCANT LYMPHOID TISSUE OBTAINED, NEGATIVE FOR MALIGNANCY     D) LUNG, LOWER RIGHT LOBE, BRONCHIAL ALVEOLAR LAVAGE FOR      CYTOLOGIC EXAM:       1) NEGATIVE FOR MALIGNANT CELLS       2) NUMEROUS ALVEOLAR MACROPHAGES AND OCCASIONAL BRONCHIAL          CELLS ARE PRESENT    CT GUIDED BX - RIGHT LUNG MASS 1/29/16     - RARE ATYPICAL BRONCHIAL CELLS    CT GUIDED BIOPSY of LUNG MASS 4/24/2017  Final Diagnosis   LEFT LUNG MASS, NEEDLE CORE BIOPSY:     -  SQUAMOUS CELL CARCINOMA      CT CHEST WO CONTRAST  12/4/2015 11:32 AM   LUNGS AND PLEURA: Patient has mild, peripheral based interstitial fibrosis, bronchiectasis and minimal areas of honeycombing. This has a peripheral and basilar distribution but with sparing the of subpleural lung. In addition, there is a focal 3 x 3 x 2 cm ovoid subpleural area of more extensive cystic bronchiectasis and peribronchiolar thickening in the right lower lobe that has not changed in the short interval (but progressed since 2010). Similary but smaller focal area of bronchiectasis noted   further superiorly in the right upper lobe and medially in the left lower  lobe (coronal images 120-109). No air-fluid levels or an obvious mass/soft tissue component. Calcified granulomas noted in the right lower lobe. No change in the 4 x 2 mm ovoid   nodule medial to the focal bronchiectasis (series 3 image 89). There are two nodules in the left upper lobe/lingula, largest measuring 4-5mm and subpleural in location (image60).Tiny groundglass nodule in the left lower lobe is also noted (series 3   image 80).   MEDIASTINUM: Multiple small mediastinal nodes are noted measuring a centimeter in short axis. There are coronary artery calcifications.   LIMITED UPPER ABDOMEN: Unremarkable.   MUSCULOSKELETAL: No suspicious lesions on bone windows.   IMPRESSION:   1. Patient has interstitial lung disease with a distribution suggestive of NSIP. In addition there are more focal areas of bronchiectasis and peribronchiolar thickening as noted above, largest in the right lower lobe. No air-fluid levels, mucus plugging or obvious soft tissue mass. While this may be inflammatory/infectious in nature, these patients are at higher risk for malignancies as well. Suggest pulmonary consultation for further evaluation.   2. Few pulmonary nodules as noted above.   3. Mildly prominent mediastinal nodes are likely reactive.   4. Coronary artery calcifications.    PET FDG/CT 1/15/2016 8:47 AM   INDICATION: Pulmonary nodule   COMPARISON: CTs from 12/04/2015, 11/30/2015, and 11/29/2010   FINDINGS:   HEAD AND NECK: Marked generalized cerebral and cerebellar volume loss, likely age-related.   CHEST: 3.5 x 1.7 cm partially cavitary right lower lobe pulmonary mass is markedly FDG avid (SUV max 9.2) and has enlarged slowly since 11/29/2010, when it was a 0.8 x 0.8 cm groundglass opacity.   Interlobular pulmonary septal thickening, subpleural bands, and traction bronchiectasis with a peripheral predominance and associated inflammatory FDG activity. Uniform, moderately FDG avid, nonenlarged, noncalcified middle mediastinal  and bilateral   hilar lymph nodes.   1.1 cm partially cavitary opacity in the periphery of the superior segment of the left lower lobe is moderately FDG avid (SUVmax 6.5), not significantly changed anatomically from 12/04/2015, but outside of the imaged volume on the older scans. Calcified   atherosclerosis, including the coronary arteries.   ABDOMEN/PELVIS: No abnormal FDG activity. Negative adrenals. Moderate calcified atherosclerosis.   MUSCULOSKELETAL: Bilateral total hip arthroplasties. Moderate degenerative changes cervical and lumbar spine.   IMPRESSION:   CONCLUSION:   1. 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 Reactive middle mediastinal and bilateral hilar lymphadenopathy.   2. 3.5 cm cavitary right lower lobe pulmonary mass has been enlarging slowly since 2010 and is suspicious for low-grade pulmonary adenocarcinoma. No definite evidence of metastases, but reactive FDG activity in right hilar and mediastinal lymph nodes   from interstitial lung disease could obscure underlying tumor metabolism.   3. 1.1 cm partially cavitary opacity in the superior segment of the left lower lobe is indeterminate. Its appearance and FDG activity are similar the suspected tumor in the contralateral right lung and a synchronous tumor is favored, but it could also   be a focal area of inflammation from the interstitial lung disease.    CT CHEST WO CONTRAST 8/15/2016 7:56 AM  COMPARISON: Chest CT dated 12/4/2015  LUNGS AND PLEURA: Central airways are patent. Mild bronchiectasis is again noted. No bronchial wall thickening. Basilar predominant reticular opacities with architectural distortion and traction bronchiectasis and bronchiolectasis have not significantly   changed. A subtle background of groundglass has minimally increased. A cavitary mass in the peripheral right lower lobe has increased in size, measuring 4.4 cm x 2.3 cm (previously 4.1 cm x 1.7  cm; series 3 image 90). A 2.9 cm x 1.0 cm opacity in the   peripheral posterior right upper lobe previously measured 1.4 cm x 0.6 cm (image 53). A peripheral opacity in the medial superior segment of the left lower lobe measuring 1.5 cm x 0.8 cm previously measured 1.1 cm x 0.9 cm (image 68).  MEDIASTINUM: Atherosclerotic disease including significant LAD coronary artery disease. Scattered additional coronary calcifications noted. Mediastinal lymphadenopathy has increased. A 1.5 cm right lower paratracheal lymph node previously measured 1.2 cm. A 1.2 cm subcarinal lymph node previously measured 1.0 cm. There is a small amount of pericardial fluid.  LIMITED UPPER ABDOMEN: Negative.  MUSCULOSKELETAL: Osteopenia and multilevel vertebral body degenerative change. There is grade 1 anterolisthesis of C7 on T1.  CONCLUSION:  1. Increasing size of the right lower lobe cavitary mass, favoring low-grade neoplasm. Additional peripheral opacities have increased in size as well, which may represent progression of fibrosis or slow-growing neoplasms.  2. Increased mediastinal lymphadenopathy.  3. Pulmonary fibrosis in a NSIP pattern. Increased groundglass represents an active/cellular component.  4. Atherosclerotic disease living coronary artery disease.  5. Osteopenia and degenerative change.    PET CT scan 11/7/2016  1. Right upper lobe wedge resection has been performed.  2. Right lower lobe pulmonary mass has enlarged and increased in FDG activity. Fiducial markers have been placed in it.  3. Chronic interstitial lung disease has progressed slightly.  4. 1.1 cm opacity in the periphery of the superior segment of the left lower lobe has decreased in FDG activity and could be a focus of inflammation as a part of the interstitial lung disease, but a tumor at this site is not excluded.    Chest CT scan 11/12/2016  1. No evidence of pulmonary emboli.  2. Compared to the PET/CT done 5 days ago, there has been little change in the  appearance of either chest. Specifically, patient's had wedge resection of the right upper lobe pleural-based opacity. Focal pneumatocele or loculated pneumothorax noted adjacent to the staple line with a complex, very small hydropneumothorax again noted in the right chest. Tiny subcutaneous emphysema with air tracking into the chest wall anteriorly at rib 4 is again seen likely site of chest tube.   3. Patient has underlying UIP but has developed focal interstitial and groundglass opacities in the left upper lobe and left lower lobe though unchanged since the PET CT, this is new since the August study and findings suggest an acute component of his interstitial disease. The lack of symmetry suggest this is not simply superimposed pulmonary edema. Suggest pulmonary consultation.  4. Cavitary pleural-based mass in the right lower lobe fiducial markers mild mediastinal adenopathy is unchanged.    CTA CHEST PE RUN 12/11/2016 1:38 AM  INDICATION: Shortness of breath.  COMPARISON: Chest CT 08/15/2016.  ANGIOGRAM CHEST: No pulmonary emboli.  LUNGS AND PLEURA: Interstitial lung disease is present with subpleural reticular nodular densities and scattered groundglass opacities. These findings have progressed since the prior exam. Stable bronchiectasis. 3.7 x 2.9 cm lobulated and partially cavitary mass in the right lower lobe has increased in size. Fiduciary markers are now present. 9 mm nodule in the anterior right lower lobe (image 242) has increased in size, interval right upper lobe wedge resection with loculated pleural air, 5 mm   lingular nodule increased in size. Tiny right pleural effusion.  MEDIASTINUM: Mediastinal and right hilar lymphadenopathy is slightly decreased.  LIMITED UPPER ABDOMEN: Negative.  MUSCULOSKELETAL: Negative.  IMPRESSION:   1. No pulmonary emboli.  2. Partially cavitated malignant mass in the right lower lobe is mildly increased in size measuring 3.7 x 2.9 cm. Fiducial markers are now present  within the mass. Progression of interstitial lung disease. New postsurgical changes right upper lobe with loculated pleural air in this location. There is a new tiny right pleural effusion.  3. 9 mm nodule anterior right lower lobe is increased in size as has a 5 mm nodule in the lingula.  4. Mediastinal lymphadenopathy is decreased    CTA CHEST PE RUN 3/12/2017 10:42 AM   INDICATION: Shortness of breath, history of lung cancer  COMPARISON: Chest CT dated 3/6/2017.  ANGIOGRAM CHEST: Negative for pulmonary emboli. Negative for thoracic aortic dissection. There is mild enlargement of the proximal descending thoracic aorta, which is unchanged. There is atherosclerotic disease including coronary artery calcification.   Aortic tortuosity is noted.  LUNGS AND PLEURA: No significant change from the prior study. Extensive groundglass, reticular, and confluent opacities throughout the lungs. Nodule in the peripheral left upper lobe. Mass along the posterior right hemithorax. No pneumothorax.  MEDIASTINUM: The heart is mildly enlarged. Mediastinal and right hilar lymphadenopathy persists.  LIMITED UPPER ABDOMEN: Normal adrenal glands.  MUSCULOSKELETAL: Osteopenia.  CONCLUSION:  1. No PE.  2. Otherwise no change from the prior study.    CTA CHEST PE RUN  4/25/2017 11:54 AM  INDICATION: dyspnea  TECHNIQUE: Helical acquisition through the chest was performed during the arterial phase of contrast enhancement using IV contrast. 2D and 3D reconstructions were performed by the CT technologist. Dose reduction techniques were used.   IV CONTRAST: Iohexol (Omni) 100 mL  COMPARISON: 3/12/2017  FINDINGS:  ANGIOGRAM CHEST: Negative for pulmonary emboli. Negative for thoracic aortic dissection and aneurysms.  LUNGS AND PLEURA: Marked fibrosis with varicoid bronchiectasis throughout the right lung, and associated parenchymal opacities, mildly progressed when compared to previous. A small right pleural effusion is also larger. Postop changes  in the right lung and right sided fiducial markers again seen. There is a new fiducial marker adjacent to the lingular nodule that was recently biopsied. This nodule measures 1.5 cm. Mild peripheral fibrosis in the left lung, similar to previous. No pneumothorax.  MEDIASTINUM: There are several mildly prominent mediastinal lymph nodes, unchanged from previous. Significant coronary artery calcification.  LIMITED UPPER ABDOMEN: Negative.   MUSCULOSKELETAL: Negative.  CONCLUSION:  1.  No pulmonary embolus.  2.  Stable lingular nodule, now with adjacent fiducial marker.  3.  Increasing pulmonary opacities throughout the right lung, superimposed on chronic fibrosis. Findings may represent progressive fibrosis or superimposed infiltrate.    CT CHEST W CONTRAST 8/9/2017 9:18 AM  INDICATION: Stage IIIa multifocal squamous carcinoma right lung diagnosed November 20, 2016.  COMPARISON: 4/25/2017  LUNGS AND PLEURA: Patient's developed a tiny hydropneumothorax on the right. Trace right-sided effusion has decreased in size. No appreciable change in the fibrosis and the varicose bronchiectasis in the right upper and lower lobes. However, decrease in the superimposed airspace opacities throughout the right lung. Fiducial markers are again noted. Resolution of the pleural-based nodule in the lingula adjacent to the fiducial marker. Nothing measurable remains. Mild subpleural fibrosis noted throughout the left lung, unchanged.   MEDIASTINUM: Mildly prominent mediastinal nodes are unchanged, none over a centimeter in size. No central pulmonary emboli. Volume loss of slight shift of the mediastinum into the right chest is unchanged  LIMITED UPPER ABDOMEN: Unremarkable.  MUSCULOSKELETAL: No suspicious lesions.  CONCLUSION:  1.  Patient's developed a tiny right apical pneumothorax. Decrease in the very small right-sided pleural effusion. Also decrease in airspace opacities throughout the right chest.  2.  Resolution of the pleural-based  175 lingular nodule.  3.  No change in the extensive fibrosis and varicoid bronchiectasis in the right lung and mild subpleural fibrosis scattered throughout the left lung.    CTA CHEST PE RUN  1/16/2018 8:55 AM  INDICATION: SOB.   IV CONTRAST: Iohexol (Omni) 75 mL  COMPARISON: CT pulmonary angiogram, 3/12/2017.   FINDINGS:  ANGIOGRAM CHEST: No evidence of pulmonary embolus.  LUNGS AND PLEURA: There is traction bronchiectasis involving the right lower lobe which has progressed from comparison CT scan. Interstitial thickening is present within this area of traction bronchiectasis. There is subpleural reticulation involving the   left lung. Also within the left lung are scattered areas of peripheral traction bronchiectasis which has progressed as well from prior study. The groundglass opacity seen previously within the bilateral lungs has improved. There are fiducial markers as   well as evidence of prior wedge resection involving the posterior right lung as seen previously. There has been placement of a new fiducial marker within a lingular nodule. This nodule is ill-defined on current study and there is consolidation and   bronchiectasis within this region. Trace right pleural effusion, similar to prior study.  MEDIASTINUM: Heart size is normal. The thoracic aorta is normal in caliber. There is mediastinal lymphadenopathy which is similar to comparison CT scan.  LIMITED UPPER ABDOMEN: Negative.   MUSCULOSKELETAL: No suspicious osseous lesions.  CONCLUSION:  1.  No pulmonary embolus.  2.  Progression of fibrotic changes/traction bronchiectasis of the bilateral lungs, worse in the right lower lobe. There is interstitial thickening throughout the right lower lobe traction bronchiectasis which could reflect an acute infectious or   inflammatory process. There are also smaller areas of subpleural reticulation within the left upper lobe, consistent with fibrotic changes as well.  3.  Persistent mediastinal lymphadenopathy,  similar to prior study.  4.  Trace right pleural effusion.  5.  Decrease in conspicuity of previously seen nodule within the lingula with interval placement of a fiducial marker.    PFTs (12/23/2015)  FEV1/FVC is 85% and is normal.   FEV1 is 3.26L (92%) predicted and is normal.   FVC is 3.83L (92%) predicted and normal.   There was no improvement in spirometry after a single inhaled dose of bronchodilator.   TLC is 5.27L (70%) predicted and is reduced.   RV is 11.41L (47%) predicted and is reduced.   DLCO is 15.62ml/min/hg (63%) predicted and is reduced when it is corrected for hemoglobin.   Flow volume loops indicate no abnormalities.    PFTs (11/4/2016)  FEV1/FVC is 87 and is normal.  FEV1 is 2.60 L (89%) predicted and is normal.  FVC is 2.98 L (75%) predicted and normal.  There was no improvement in spirometry after a single inhaled dose of bronchodilator.  TLC is 4.82 L (66%) predicted and is reduced.  RV is 1.79 L (61%) predicted and is reduced.  DLCO is 54% predicted and is reduced when it is corrected for hemoglobin.

## 2021-06-16 NOTE — PROGRESS NOTES
Assessment/Plan:     1. Heart failure with preserved ejection fraction, NYHA class III: Kartik Tam appears well compensated.  He continues to have dyspnea on exertion.  He states his lower extremity edema has improved with increased dose of Lasix.  I will continue Lasix 40 mg twice a day for the next 2 days and then he will continue 40 mg daily thereafter.  I encouraged him to call the clinic if he has symptoms of fluid retention.    Follow-up with Dr. Hicks in May and in the heart failure clinic on March 13 as scheduled    Subjective:     Kartik Tam is seen at [facility] heart failure clinic today for continued follow-up.  His wife accompanies him today.  He follows up for heart failure with preserved ejection fraction.  His most recent echocardiogram was done January 28, 2018 which showed an ejection fraction of 64%.  He has a past medical history significant for hypertension, coronary artery disease, hyperlipidemia, permanent atrial fibrillation, pulmonary fibrosis, lung cancer, and bladder cancer.  He had a pacemaker implanted January 19, 2018 due to tachybradycardia syndrome.  Dr. Hicks has recommended possible AV node ablation due to him being tachycardic with being on digoxin and metoprolol.  Today, he and his wife informed me that they will be meeting with hospice next week.    Today, he comes in after being evaluated in the emergency department a few days ago for shortness of breath.  His Lasix was increased to 40 mg twice a day for 3 days.  He states his lower extremity edema has improved.  His shortness of breath has not improved with the increase of Lasix.  He states his appetite has decreased.  His clinic weight is down 9 pounds in 1 week.  He denies any orthopnea or PND.  He denies any dizziness or lightheadedness.  He denies lightheadedness, shortness of breath, orthopnea, PND, palpitations, chest pain, abdominal fullness/bloating and lower extremity edema.      He is monitoring  home weights which have decreased. His weight today at home was 162 pounds. He is following a low sodium diet.     Review of Systems:   General: WNL  Eyes: WNL  Ears/Nose/Throat: WNL  Lungs: WNL  Heart: WNL  Stomach: WNL  Bladder: WNL  Muscle/Joints: WNL  Skin: WNL  Nervous System: WNL  Mental Health: WNL     Blood: WNL     Patient Active Problem List   Diagnosis     Bronchiectasis without complication     CIS (carcinoma in situ of bladder), dx 2010.      Mass of upper lobe of left lung     ILD (interstitial lung disease)     Squamous cell lung cancer, right     Coronary artery disease involving native coronary artery of native heart without angina pectoris     Pleural effusion     NASH (dyspnea on exertion)     Hypothyroidism, unspecified type     Pulmonary fibrosis     Orthostatic hypotension     Chronic anticoagulation, [id number] score 3     Epididymal cyst     Acute radiation pneumonitis     Heart failure with preserved ejection fraction     Dyspnea on exertion     Adjustment disorder with depressed mood     Bladder cancer     Chest pain     Herpes zoster without complication     Hematuria     Shortness of breath     Fall, initial encounter     Falls frequently     Tachycardia-bradycardia syndrome     Pure hypercholesterolemia     Essential hypertension     Permanent atrial fibrillation     Cardiac pacemaker in situ       Past Medical History:   Diagnosis Date     A-fib 12/05/2014     Bladder cancer      Bronchiectasis RLL      Cataract      CHF (congestive heart failure)      Chronic kidney disease      Colon polyp      Coronary artery disease      Dementia?      DJD (degenerative joint disease) hips      Hearing impaired + tinnitus      High cholesterol      History of transfusion      Hyperlipidemia      Hypertension     pt. denies     Hypothyroidism     hypo     Lung cancer      Lung nodule     RLL     Micturition syncope      Osteopenia      Pneumonitis     acute radiation     Pneumothorax       Pneumothorax, postoperative 11/10/2016     Pulmonary fibrosis      Shingles      Shortness of breath     SOB is his normal due to his lung disease       Past Surgical History:   Procedure Laterality Date     APPENDECTOMY       Attempted lung  biopsy  04/19/2017     BLADDER SURGERY  2010     CARDIAC CATHETERIZATION  08/02/2016    stent x 1     CARDIAC ELECTROPHYSIOLOGY STUDY AND ABLATION       COLONOSCOPY       EP PACEMAKER INSERT N/A 1/19/2018    Procedure: EP Pacemaker Insertion;  Surgeon: Lewis Poole MD;  Location: [facility];  Service:      JOINT REPLACEMENT Bilateral     Hips     LUNG CANCER SURGERY  12/2016 and 01/2017    Cyber Knife-9 treatments     KS CYSTOURETHROSCOPY,BIOPSY N/A 1/10/2018    Procedure: CYSTOSCOPY, WITH BLADDER BIOPSY AND FULGURATION;  Surgeon: Gentry Sullivan MD;  [place] [facility] [place]  Service: Urology     KS CYSTOURETHROSCOPY,FULGUR <0.5 CM LESN N/A 1/25/2016    Procedure: CYSTOSCOPY BLADDER BIOPSIES TRANSURETHRAL RESECTION BLADDER TUMOR;  Surgeon: Antoine Rodriguez MD;  [place] [facility] [place]  Service: Urology     THORACOSCOPY Right 9/26/2016    Procedure:   RIGHT THORACOSCOPY/ RIGHT UPPER LOBE AND RIGHT LOWER LOBE WITH NEEDLE WIRE LOCALIZATION MULTIPLE WEDGE RESECTION NODE SAMPLING FIDUCIALS PLACEMENT;  Surgeon: Brandon Pavon MD;  Location: [facility] OR;  Service:      THORACOSCOPY Right 9/26/2016    Procedure: THORACOSCOPY FOR CONTROL POST OPERATIVE BLEED;  Surgeon: Brandon Pavon MD;  Location: [facility];  Service:        Family History   Problem Relation Age of Onset     Diabetes Mother      Heart disease Mother      No Medical Problems Father      Colon cancer Brother      Throat cancer Brother        Social History     Social History     Marital status:      Spouse name: N/A     Number of children: 3     Years of education: N/A     Occupational History     Not on file.     Social History Main Topics     Smoking status:  Former Smoker     Packs/day: 3.00     Years: 30.00     Quit date: 12/5/1979     Smokeless tobacco: Never Used     Alcohol use No      Comment: alcoholism in the past, quit drinking 1972      Drug use: No     Sexual activity: Not on file     Other Topics Concern     Not on file     Social History Narrative       Current Outpatient Prescriptions   Medication Sig Dispense Refill     acetaminophen (TYLENOL) 650 MG CR tablet Take 650 mg by mouth daily as needed for pain.       albuterol (PROAIR HFA;PROVENTIL HFA;VENTOLIN HFA) 90 mcg/actuation inhaler Inhale 2 puffs every 6 (six) hours as needed.       atorvastatin (LIPITOR) 20 MG tablet Take 1 tablet (20 mg total) by mouth at bedtime. 90 tablet 3     digoxin (LANOXIN) 250 mcg tablet Take 1 tablet (250 mcg total) by mouth daily. 90 tablet 1     fludrocortisone (FLORINEF) 0.1 mg tablet Take 1 tablet (0.1 mg total) by mouth daily. 90 tablet 0     furosemide (LASIX) 40 MG tablet Take 40 mg by mouth 2 (two) times a day.       levothyroxine (SYNTHROID, LEVOTHROID) 150 MCG tablet Take 150 mcg by mouth daily.       metoprolol succinate (TOPROL-XL) 25 MG Take 0.5 tablets (12.5 mg total) by mouth 2 (two) times a day. 90 tablet 3     omeprazole (PRILOSEC) 20 MG capsule Take 1 capsule (20 mg total) by mouth daily. 90 capsule 3     predniSONE (DELTASONE) 10 mg tablet Take 20 mg (two tablets) daily for 4 weeks then down to 10 mg daily 60 tablet 6     sulfamethoxazole-trimethoprim (SEPTRA) 400-80 mg per tablet Take 1 tablet by mouth daily. 30 tablet 2     warfarin (COUMADIN) 1 MG tablet Take 1 to 3 tablets by mouth daily. Adjust dose based on INR results as directed. 90 tablet 2     No current facility-administered medications for this visit.        Allergies   Allergen Reactions     Penicillins Rash     Patient had reaction to medication 65 years ago, may or may not have been the penicillin. Had huge rash.       Objective:     Vitals:    02/21/18 1526   BP: 110/66   Pulse: 60   Resp:  18     Wt Readings from Last 3 Encounters:   02/21/18 165 lb (74.8 kg)   02/18/18 173 lb 15.1 oz (78.9 kg)   02/15/18 174 lb (78.9 kg)       General Appearance:   Alert, cooperative and in no acute distress.   HEENT:  No scleral icterus; the mucous membranes were pink and moist.   Neck: JVP normal. No HJR.   Chest: The spine was straight. The chest was symmetric.   Lungs:   Respirations unlabored; the lungs are clear to auscultation.   Cardiovascular:   Regular rhythm. S1 and S2 without murmur, clicks or rubs. Radial and posterior tibial pulses are intact and symmetrical.    Abdomen:  Soft, nontender, nondistended, bowel sounds present   Extremities: No cyanosis, clubbing, or edema. Wearing compression stockings.   Skin: No xanthelasma.   Neurologic: Mood and affect are appropriate.         Lab Review   Lab Results   Component Value Date    CREATININE 0.93 02/18/2018    BUN 13 02/18/2018     02/18/2018    K 3.7 02/18/2018     02/18/2018    CO2 29 02/18/2018     Lab Results   Component Value Date     (H) 02/18/2018     BNP (pg/mL)   Date Value   02/18/2018 603 (H)   01/27/2018 172 (H)   01/16/2018 168 (H)     Creatinine (mg/dL)   Date Value   02/18/2018 0.93   01/27/2018 1.08   01/21/2018 0.83   01/17/2018 1.21       Cardiographics  Echocardiogram: 1/28/2018  Summary        Left Ventricle: Normal left ventricular size.The calculated left ventricular ejection fraction is 64%. This represents a normal ejection fraction. The left ventricular wall thickness is normal. E/e' < 8, suggesting normal LV filling pressure.    The left ventricular wall motion is normal.    Right Ventricle: Right ventricle not well visualized. Normal right ventricular size. Pacer lead is present in the ventricle.    Tricuspid Valve: There is no evidence of tricuspid stenosis. Trace tricuspid valve regurgitation. The estimated systolic pulmonary artery pressure is Right ventricular systolic pressures estimated at 28 mmHg plus  right atrial pressure. mmhg.            25 minutes were spent face to face with the patient with greater than 50% spent on education and counseling.      Vero Larson, [facility]   Heart Failure Clinic

## 2021-06-25 NOTE — PROGRESS NOTES
Progress Notes by Joan Patel MD at 4/6/2017 12:45 PM     Author: Joan Patel MD Service: -- Author Type: Physician    Filed: 4/6/2017  6:00 PM Encounter Date: 4/6/2017 Status: Signed    : Joan Patel MD (Physician)         [facility] Radiation Oncology Follow Up Note    Patient: Kartik Loaiza  MRN: [id number]  Date of Service: 04/06/2017    Assessment / Impression     1. Squamous cell carcinoma of right lung     2. Mass of upper lobe of left lung  CT Fiducial Placement Lung      ECOG Peformance Status  ECOG Performance Status: 1  Distress Assessment Score  Distress Assessment Score: No distress  Body site: Thorax    Plan:     Mass and posterior left lung mass, presenting in the face of 2 previous masses in the right lung, one of which was resected with positive margins and treated with postoperative stereotactic radiosurgery, and another right lower lobe mass more inferiorly that was treated primarily with stereotactic radiosurgery.    I reviewed the PET scan results with Mr. loaiza and his wife.  Obviously the concern is that these lesions represent metastatic disease, although there is a slight chance that they could represent new lesions.  Given the location of the lateral lesion and the appearance of the PET scan I think there is some motion noted in this lesion with respiratory effort.  The more posterior lesion has less motion.  It is also located next to the spine.  I discussed the typical cold treatment for metastatic cancer versus a new primary.  It is likely that these represent metastatic sites.  After discussion with Dr. Andre the patient is not considered a great medical candidate for chemotherapy.  Technically he could undergo stereotactic radiosurgery.  He does not have great lung function and did have some radiation pneumonitis after his last treatments.  Clinically he feels quite good and is not having any symptoms related to either of these lesions.  He also does  not have any distant metastasis outside the chest.  After lengthy discussion of the benefits of local treatment in light of a probable systemic problem and the possibility of systemic treatment Dr. Andre and I agreed on local treatment for these 2 lesions.  I would place a fiducial in the lateral chest wall mass to reduce the volume of treatment area and treat the paraspinal mass based upon spine tracking.  I will get in touch with the patient and relayed this information to him and proceed based upon his wishes.    Face to face time  15 minutes with > 75% spent on consultation, education and coordination of care.    Subjective:      HPI: Kartik Tam is a 81 y.o. male with pulmonary fibrosis was recently found to have squamous cell carcinoma in his right lung. He has been having serial CT scans to follow some masses. They have been slowly enlarging. He went on to have a biopsy and resection on September 26. He was found to have 2 separate areas of squamous cell carcinoma. Multiple lymph nodes sampled were negative.       He underwent stereotactic radiosurgery to 2 different locations. The right lower lobe was treated to a dose of 5000 cm in 5 fractions, delivered from December 28, 2016 through January 9, 2017. The right upper lobe lung mass was treated to a dose of 4800 cGy in 4 fractions, from December 16 2 December 23, 2016.              Chief Complaint   Patient presents with   ? HE Cancer     f/u appt   .    Current Outpatient Prescriptions   Medication Sig Dispense Refill   ? aspirin 81 MG EC tablet Take 81 mg by mouth daily.     ? atorvastatin (LIPITOR) 20 MG tablet Take 1 tablet (20 mg total) by mouth bedtime. 90 tablet 3   ? digoxin (LANOXIN) 125 mcg tablet Take 1 tablet (125 mcg total) by mouth every morning. 30 tablet 0   ? levothyroxine (SYNTHROID, LEVOTHROID) 150 MCG tablet Take 150 mcg by mouth Daily at 6:00 am.     ? metoprolol tartrate (LOPRESSOR) 25 MG tablet Take 1 tablet (25 mg total) by mouth  2 (two) times a day. 60 tablet 0   ? omeprazole (PRILOSEC) 20 MG capsule Take 1 capsule (20 mg total) by mouth Daily before breakfast. 30 capsule 0   ? predniSONE (DELTASONE) 10 MG tablet Take 1 tablet (10 mg total) by mouth daily. 90 tablet 6   ? warfarin (COUMADIN) 2.5 MG tablet Hold coumadin dose 3/20 and 3/21 then get repeat INR on 3/22 and PCP to resume as appropriate  0     No current facility-administered medications for this visit.        The following portions of the patient's history were reviewed and updated as appropriate: allergies, current medications, past family history, past medical history, past social history, past surgical history and problem list.    Review of Systems    Constitutional  Constitutional (WDL): Exceptions to WDL  Fatigue: Fatigue relieved by rest  Neurosensory  Neurosensory (WDL): Exceptions to WDL  Ataxia: Asymptomatic, clinical or diagnostic observations only, intervention not indicated (upon standing)  Peripheral Sensory Neuropathy: Asymptomatic, loss of deep tendon reflexes or paresthesia  Dizziness: Mild unsteadiness or sensation of movement (upon standing occasionally)  Eye        Eye Disorder (WDL): All eye disorder elements are within defined limits  Ear  Tinnitus: Mild symptoms, intervention not indicated  Cardiovascular  Cardiovascular (WDL): Exceptions to WDL  Palpitations: Definition: A disorder characterized by inflammation of the muscle tissue of the heart.  Pulmonary  Respiratory (WDL): Exceptions to WDL  Cough: Mild symptoms, nonprescription intervention indicated (dry)  Dyspnea: Shortness of breath with minimal exertion, limiting instrumental ADL  Gastrointestinal  Gastrointestinal (WDL): All gastrointestinal elements are within defined limits  Genitourinary  Genitourinary (WDL): All genitourinary elements are within defined limits              Musculoskeletal              Musculoskeletal and Connetive Tissue Disorders (WDL): All Musculoskeletal and Connetive  "Tissue Disorder elements are within defined limits  Integumentary  Integumentary (WDL): All integumentary elements are within defined limits  Patient Coping  Patient Coping: Accepting  Pain              Currently in Pain: No/denies  Accompanied by  Accompanied by: Family Member    Objective:     Physical Exam    Vitals:    04/06/17 1250   BP: 146/82   Pulse: 83   SpO2: 95%   Weight: 181 lb 14.4 oz (82.5 kg)   Height: 5' 11\" (1.803 m)        General appearance: alert, appears stated age and cooperative  Head: Normocephalic, without obvious abnormality, atraumatic  Eyes: negative findings: lids and lashes normal, conjunctivae and sclerae normal, corneas clear and pupils equal, round, reactive to light and accomodation  Neck: no adenopathy, no JVD, supple, symmetrical, trachea midline and thyroid not enlarged, symmetric, no tenderness/mass/nodules  Lungs: clear to auscultation bilaterally  Heart: regular rate and rhythm, S1, S2 normal, no murmur, click, rub or gallop  Lymph nodes: Cervical, supraclavicular, and axillary nodes normal.  Neurologic: Alert and oriented X 3, normal strength and tone. Normal symmetric reflexes. Normal coordination and gait    Recent Labs:   Recent Results (from the past 168 hour(s))   POCT Glucose   Result Value Ref Range    Glucose, POC 82 mg/dL   INR   Result Value Ref Range    INR 2.20 (H) 0.90 - 1.10       Imaging: Imaging results 30 days: Xr Chest Pa And Lateral    Result Date: 3/17/2017  XR CHEST PA AND LATERAL 3/17/2017 9:23 AM INDICATION: SOB COMPARISON: The CT from 3/12/2017. FINDINGS: Allowing for differences in technique no significant interval change. Extensive fibrotic changes involving the lungs including the entire right lung and left base. Previous fiducials markers in the right lungs. No discrete consolidative pulmonary infiltrate, pleural effusion or pneumothorax.    Cta Chest Pe Run    Result Date: 3/12/2017  CTA CHEST PE RUN 3/12/2017 10:42 AM INDICATION: Shortness of " breath, history of lung cancer TECHNIQUE: Helical acquisition through the chest was performed during the arterial phase of contrast enhancement using IV contrast. 2D and 3D reconstructions were performed by the CT technologist. Dose reduction techniques were used. IV CONTRAST: Iohexol (Omni) 75mL COMPARISON: Chest CT dated 3/6/2017. FINDINGS: ANGIOGRAM CHEST: Negative for pulmonary emboli. Negative for thoracic aortic dissection. There is mild enlargement of the proximal descending thoracic aorta, which is unchanged. There is atherosclerotic disease including coronary artery calcification. Aortic tortuosity is noted. LUNGS AND PLEURA: No significant change from the prior study. Extensive groundglass, reticular, and confluent opacities throughout the lungs. Nodule in the peripheral left upper lobe. Mass along the posterior right hemithorax. No pneumothorax. MEDIASTINUM: The heart is mildly enlarged. Mediastinal and right hilar lymphadenopathy persists. LIMITED UPPER ABDOMEN: Normal adrenal glands. MUSCULOSKELETAL: Osteopenia.     CONCLUSION: 1.  No PE. 2.  Otherwise no change from the prior study.    Nm Pet Ct Skull To Mid Thigh    Result Date: 4/3/2017  PET FDG/CT 4/3/2017 9:42 AM INDICATION: Lung cancer, restaging. Interval radiation therapy. Enlarging pulmonary nodule. Subsequent treatment strategy. History of bladder cancer. TECHNIQUE: Serum glucose level 82 mg/dL. One hour post right antecubital intravenous administration of 10.2 mCi F-18 FDG, PET imaging was performed from the skull base to the mid thighs utilizing attenuation correction with concurrent axial CT and PET/CT  image fusion. Dose reduction techniques were used. COMPARISON: PET/CT 11/07/2016. CT chest 03/12/2017, 03/06/2017 reviewed. FINDINGS: HEAD AND NECK: Normal physiologic FDG uptake. Mild mucosal thickening right maxillary sinus. Diminutive or absent thyroid gland. CHEST: Complex confluent irregular reticular and groundglass consolidative  opacities throughout the mid and lower right lung have increased since 11/07/2016 associated with moderate diffuse uptake (SUVmax 5.2). Scattered hazy and reticular opacities throughout the left lung are slightly less prominent than 11/07/2016 and demonstrate mild uptake. Findings in both lungs have decreased slightly since 03/12/2017. Findings are nonspecific but suggest a nonspecific chronic interstitial lung disease with areas of superimposed posttreatment inflammation and/or infection. A hypermetabolic mass in the right lower lobe centered about the fiducial markers has decreased in size since 11/07/2016 and associated focal uptake has resolved, now indistinguishable from background moderate uptake elsewhere. An enlarging 1.3 cm nodule in the peripheral lingula, as depicted on comparison CTs, demonstrates marked focal uptake (SUVmax 9.3). An additional area of moderate focal uptake (SUVmax 5.4) has developed in the superomedial aspect left lower lobe with subtle underlying ill-defined opacity on correlative CT. Right upper lobe wedge resection. Prominent mildly enlarged mediastinal and hilar lymph nodes demonstrate overall slight decrease size and mild uptake, possibly reactive given pulmonary parenchymal opacities elsewhere. ABDOMEN/PELVIS: Normal physiologic FDG uptake. Normal adrenal. Moderate atherosclerotic calcifications. MUSCULOSKELETAL: No suspicious focal skeletal uptake. Bilateral THAs. Mild inflammatory uptake about both hips. Moderate degenerative changes in the spine.     CONCLUSION: 1. Interval favorable treatment response involving a peripheral right lower lobe mass post radiation therapy. 2. An enlarging 1.3 cm peripheral nodule in the lingula with marked uptake is suspicious for an area of primary or metastatic malignancy. A focal area of moderate uptake in the posterior medial left lower lobe is indeterminate but suspicious for an additional site of developing malignancy. 3. Extensive bilateral  pulmonary opacities with areas of mild to moderate uptake suggest chronic interstitial lung disease with superimposed posttreatment inflammatory change and/or infection.                 Signed by: Joan Patel MD

## 2021-06-25 NOTE — PROGRESS NOTES
Progress Notes by Joan Patel MD at 3/9/2017  8:15 AM     Author: Joan Patel MD Service: -- Author Type: Physician    Filed: 3/9/2017  8:47 AM Encounter Date: 3/9/2017 Status: Signed    : Joan Patel MD (Physician)         [facility] Radiation Oncology Follow Up Note    Patient: Kartik Tam  MRN: [id number]  Date of Service: 03/09/2017    Assessment / Impression     1. Squamous cell carcinoma of right lung        ECOG Peformance Status  ECOG Performance Status: 1  Distress Assessment Score  Distress Assessment Score: No distress  Body site: Thorax    Plan:     Lung cancer:  He has postradiation changes in the right lower lobe which includes fine crackles to auscultation.  S1 has a new small 6 mm spot in the left lingula which is pleural-based.  Symptomatically his breathing is fairly good.  He was not using albuterol when he saw Dr. Andre, and Dr. Andre had this prescription filled.  He started to use it now and is planning on going walking with his friends today.  This will be the first time he is really used it.  I did explain to him that he can use it as prescribed as he noted to me that he didn't want to become dependent upon it.  I explained its proper used to him.  I'll see him back for a PET/CT to evaluate treatment response in 1 month's time.    Face to face time  15 minutes with > 50% spent on consultation, education and coordination of care.    Subjective:      HPI: Kartik Tam is a 81 y.o. male with with pulmonary fibrosis was recently found to have squamous cell carcinoma in his right lung. He has been having serial CT scans to follow some masses. They have been slowly enlarging. He went on to have a biopsy and resection on September 26. He was found to have 2 separate areas of squamous cell carcinoma. Multiple lymph nodes sampled were negative.     He underwent stereotactic radiosurgery to 2 different locations.  The right lower lobe was treated to a dose of 5000  cm in 5 fractions, delivered from December 28, 2016 through January 9, 2017.  The right upper lobe lung mass was treated to a dose of 4800 cGy in 4 fractions, from December 16 2 December 23, 2016.      Chief Complaint   Patient presents with   ? HE Cancer     Follow up with Dr. Patel   .    Current Outpatient Prescriptions   Medication Sig Dispense Refill   ? albuterol (PROVENTIL HFA;VENTOLIN HFA) 90 mcg/actuation inhaler Inhale 2 puffs every 6 (six) hours as needed for wheezing. 1 Inhaler 6   ? aspirin 81 mg chewable tablet Chew 81 mg daily.     ? atorvastatin (LIPITOR) 20 MG tablet Take 1 tablet (20 mg total) by mouth bedtime. 90 tablet 3   ? levothyroxine (SYNTHROID, LEVOTHROID) 150 MCG tablet Take 150 mcg by mouth Daily at 6:00 am.     ? predniSONE (DELTASONE) 5 MG tablet Take 1 tablet (5 mg total) by mouth daily. 90 tablet 3   ? sotalol (BETAPACE) 80 MG tablet Take 0.5 tablets (40 mg total) by mouth 2 (two) times a day. 90 tablet 0   ? warfarin (COUMADIN) 2.5 MG tablet Take one and one-half tablets  to two tablets (3.75mg to 5 mg) by mouth daily. Adjust dose based on INR results as directed. 150 tablet 0   ? warfarin (COUMADIN) 3 MG tablet Take 1 tablet (3 mg) by mouth daily on 12/13/16.  INR 12/14/16. Adjust dose based on INR results as directed. 10 tablet 0     No current facility-administered medications for this visit.        The following portions of the patient's history were reviewed and updated as appropriate: allergies, current medications, past family history, past medical history, past social history, past surgical history and problem list.    Review of Systems    Constitutional  Constitutional (WDL): Exceptions to WDL  Fatigue: Fatigue relieved by rest  Neurosensory  Neurosensory (WDL): Exceptions to WDL  Dizziness: Mild unsteadiness or sensation of movement (occasionally)  Eye        Eye Disorder (WDL): All eye disorder elements are within defined limits (glasses)  Ear    "  Cardiovascular  Cardiovascular (WDL): All cardiovascular elements are within defined limits  Pulmonary  Respiratory (WDL): Exceptions to WDL  Cough: Mild symptoms, nonprescription intervention indicated  Dyspnea: Shortness of breath with moderate exertion (worse the past couple weeks)  Gastrointestinal  Gastrointestinal (WDL): All gastrointestinal elements are within defined limits  Genitourinary  Genitourinary (WDL): Exceptions to WDL (nocturia times 2-3)              Musculoskeletal              Musculoskeletal and Connetive Tissue Disorders (WDL): All Musculoskeletal and Connetive Tissue Disorder elements are within defined limits  Integumentary  Integumentary (WDL): All integumentary elements are within defined limits  Patient Coping  Patient Coping: Accepting  Pain              Currently in Pain: No/denies  Accompanied by  Accompanied by: Alone    Objective:     Physical Exam    Vitals:    03/09/17 0818   BP: 140/79   Pulse: 62   Temp: 97.6  F (36.4  C)   TempSrc: Oral   SpO2: 97%   Weight: 184 lb 9.6 oz (83.7 kg)   Height: 5' 11\" (1.803 m)        Visit Vitals   ? /79   ? Pulse 62   ? Temp 97.6  F (36.4  C) (Oral)   ? Ht 5' 11\" (1.803 m)   ? Wt 184 lb 9.6 oz (83.7 kg)   ? SpO2 97%   ? BMI 25.75 kg/m2     General appearance: alert, appears stated age and cooperative  Head: Normocephalic, without obvious abnormality, atraumatic  Eyes: negative findings: conjunctivae and sclerae normal, corneas clear and pupils equal, round, reactive to light and accomodation  Neck: no adenopathy, no JVD, supple, symmetrical, trachea midline and thyroid not enlarged, symmetric, no tenderness/mass/nodules  Lungs: rales base - right and Otherwise clear bilaterally  Heart: regular rate and rhythm, S1, S2 normal, no murmur, click, rub or gallop  Extremities: extremities normal, atraumatic, no cyanosis or edema  Lymph nodes: Cervical, supraclavicular, and axillary nodes normal.  Neurologic: Alert and oriented X 3, normal " strength and tone. Normal symmetric reflexes. Normal coordination and gait    Recent Labs:   Recent Results (from the past 168 hour(s))   POCT creatinine   Result Value Ref Range    POC Creatinine 0.9 mg/dL   POCT GFR   Result Value Ref Range    POC GFR AMER AF HE >60  >60 mL/min/1.73m2    POC GFR NON AMER AF >60  >60 mL/min/1.73m2       Imaging: Imaging results 30 days: Ct Chest With Contrast    Result Date: 3/6/2017  CT CHEST W CONTRAST 3/6/2017 8:25 AM INDICATION: Neoplasm - lymphoma TECHNIQUE: Routine chest. Dose reduction techniques were used. IV CONTRAST: [id number] COMPARISON: 11/12/2016, 12/11/2016. FINDINGS: LUNGS AND PLEURA: There are postoperative changes from right thoracotomy. The loculated hydropneumothorax seen on the prior studies has resolved. There is pleural-based thickening posteriorly in the right lung and images 53 [id number] with 2 fiducial markers present. The mass lesion seen on 12/11/2016 at this site appears slightly smaller in anterior posterior dimension now measuring proximally 2.2 cm versus 2.5 cm. There is peripheral interlobular septal thickening an there are patchy groundglass opacities throughout the right lung and more peripherally in the left lung. This may represent post treatment changes on the right versus progression of inflammatory infiltrates. There is a small pleural-based nodule in the inferior lingula 6 mm. There is bronchiectasis most pronounced in the right lower lobe. No pneumothorax. Mild pleural thickening in the right posterior lung no definite pleural effusion. On image 78 of series 3 measuring 1.2 cm in diameter. On the prior study of 12/11/2016 this measured MEDIASTINUM: Stable right paratracheal and aorticopulmonary window lymphadenopathy. No pericardial effusion. There is coronary artery calcification. LIMITED UPPER ABDOMEN: Visualized liver and adrenal glands are normal. No lymphadenopathy in the upper abdomen. MUSCULOSKELETAL: Negative.     CONCLUSION: 1.   Interval resolution of loculated hydropneumothorax in the right posterior lung. 2.  Slight decrease in size of pleural-based right lung mass with associated fiducial markers 3.  interstitial fibrotic changes with scattered patchy groundglass opacities most pronounced in the right lung this may represent posttreatment change on the right. 4.  Increased size of nodule in the inferior lingula on the left fifth clinically indicated PET/CT may be helpful for further evaluation of this nodule.          Signed by: Joan Patel MD

## 2021-06-25 NOTE — PROGRESS NOTES
Progress Notes by Michelle Tello FNP at 12/11/2017  2:30 PM     Author: Michelle Tello FNP Service: -- Author Type: Nurse Practitioner    Filed: 12/14/2017  6:59 AM Encounter Date: 12/11/2017 Status: Signed    : Michelle Tello FNP (Nurse Practitioner)       Assessment/Plan:      Visit for Preoperative Exam.       1. Patient is approved for surgery  2. Take last dose of warfarin today then hold warfarin for upcoming procedure. Restart warfarin within 24 hours of procedure   3. CMP, CBC today-- these were not drawn today despite a note requesting this. He was called to return to the office later this week for a lab visit    Subjective:     Scheduled Procedure: Cystoscopy with bladder biopsy  Surgery Date:  12/18/17  Surgery Location:  [facility]  Surgeon:  Dr. Sullivan   HPI: Today he had an appointment with urology, had a red spot on the bladder and is scheduled for a bladder biopsy. He has a history of bladder cancer with previous transurethral resection of the bladder around 2011 with recurrence in 2016. He is going to have a cystoscopy with biopsy on 12/18/17.     We reviewed his history of squamous cell carcinoma of the right lung. He has increasing dyspnea which has been evaluated extensively by cardiology and pulmonology. Most recently prednisone was increased to 40mg daily. He did a trial of a lower dose of oral prednisone with an inhaler without improvement. He will follow up with pulmonology later this week.     CHF- Appears compensated. He continues to have shortness of breath with exertion which remains at a recent baseline for patient and improved with prednisone which he is prescribed by pulmonology. He was able to shovel his drive this morning with frequent rests. No weight gain or orthopnea.     HTN-blood pressure is well controlled with current medication. No lightheadedness or dizziness associated with treatment.     Persistent atrial fibrillation- rate is well  controlled today. He is anticoagulated on warfarin.     We reviewed an episode of choking which occurred x 1 and prompted his last office visit. He denies any additional choking episodes or difficulty swallowing.     Yesterday and today he has pain in the right chest. The pain is intermittent, nothing seems to make the pain worse. It feels like when he had chest tubes in place. Has not felt more short of breath, even pushed snow this morning.     Current Outpatient Prescriptions   Medication Sig Dispense Refill   ? aspirin 81 MG EC tablet Take 81 mg by mouth daily.     ? atorvastatin (LIPITOR) 20 MG tablet Take 1 tablet (20 mg total) by mouth at bedtime. 90 tablet 3   ? digoxin (LANOXIN) 125 mcg tablet Take 1 tablet (125 mcg total) by mouth every morning. 90 tablet 3   ? furosemide (LASIX) 40 MG tablet Take 1 tablet (40 mg total) by mouth daily. 90 tablet    ? levothyroxine (SYNTHROID, LEVOTHROID) 150 MCG tablet TAKE 1 TABLET (150 MCG TOTAL) BY MOUTH DAILY AT 6:00 AM. 90 tablet 0   ? metoprolol succinate (TOPROL-XL) 25 MG Take 0.5 tablets (12.5 mg total) by mouth 2 (two) times a day. 90 tablet 3   ? omeprazole (PRILOSEC) 20 MG capsule Take 1 capsule (20 mg total) by mouth daily. 90 capsule 3   ? predniSONE (DELTASONE) 20 MG tablet Take 2 tabs (40mg total) daily. 60 tablet 0   ? predniSONE (DELTASONE) 5 MG tablet Take 1 tab a day. (Patient taking differently: 20 mg 2 (two) times a day. Take 1 tab a day.) 30 tablet 2   ? sulfamethoxazole-trimethoprim (BACTRIM) 400-80 mg per tablet Take 1 tablet by mouth daily. 30 tablet 3   ? warfarin (COUMADIN) 2.5 MG tablet Take 2.5mg (1 tab) on Tue Thur Sat, and 3.75mg (1 and 1/2 tabs) on all other days.  OR AS DIRECTED.  Adjust dose based on INR. 120 tablet 1   ? predniSONE (DELTASONE) 10 mg tablet Take 1 tablet (10 mg total) by mouth daily. 30 tablet 12   ? predniSONE (DELTASONE) 20 MG tablet Take 1 tablet (20 mg total) by mouth daily. 90 tablet 1     No current  facility-administered medications for this visit.        Allergies   Allergen Reactions   ? Penicillins      Patient had reaction to medication 65 years ago, may or may not have been the penicillin. Had huge rash.       Immunization History   Administered Date(s) Administered   ? Influenza high dose, seasonal 10/04/2016, 10/03/2017   ? Influenza, inj, historic,unspecified 09/26/2014   ? Pneumo Conj 13-V (2010&after) 08/10/2016   ? Pneumo Polysac 23-V 08/17/2004, 01/30/2012   ? Tdap 09/14/2010   ? ZOSTER 11/01/2012       Patient Active Problem List   Diagnosis   ? Persistent atrial fibrillation   ? HTN (hypertension)   ? Bronchiectasis without complication   ? CIS (carcinoma in situ of bladder), dx 2010.    ? Mass of upper lobe of left lung   ? Interstitial lung disease   ? HLD (hyperlipidemia)   ? Squamous cell carcinoma of right lung   ? Coronary artery disease involving native coronary artery of native heart without angina pectoris   ? Pleural effusion   ? Hypothyroidism, unspecified type   ? Pulmonary fibrosis   ? Chronic anticoagulation, [id number] score 3   ? Epididymal cyst   ? Acute radiation pneumonitis   ? Heart failure with preserved ejection fraction   ? Dyspnea on exertion   ? Adjustment disorder with depressed mood   ? Bladder cancer       Past Medical History:   Diagnosis Date   ? A-fib 12/05/2014   ? Bladder cancer    ? Bronchiectasis RLL    ? Cataract    ? Colon polyp    ? Coronary artery disease    ? Dementia?    ? DJD (degenerative joint disease) hips    ? Hearing impaired + tinnitus    ? High cholesterol    ? Hyperlipidemia    ? Hypertension     pt. denies   ? Hypothyroidism     hypo   ? Lung cancer    ? Lung nodule     RLL   ? Micturition syncope    ? Osteopenia    ? Pneumonitis     acute radiation   ? Pneumothorax    ? Pneumothorax, postoperative 11/10/2016   ? Pulmonary fibrosis    ? Shingles    ? Shortness of breath        Social History     Social History   ? Marital status:       Spouse name: N/A   ? Number of children: 3   ? Years of education: N/A     Occupational History   ? Not on file.     Social History Main Topics   ? Smoking status: Former Smoker     Packs/day: 3.00     Years: 30.00     Quit date: 12/5/1979   ? Smokeless tobacco: Never Used   ? Alcohol use No      Comment: alcoholism in the past, quit drinking 1972    ? Drug use: No   ? Sexual activity: Not on file     Other Topics Concern   ? Not on file     Social History Narrative       Past Surgical History:   Procedure Laterality Date   ? APPENDECTOMY     ? Attempted lung  biopsy  04/19/2017   ? BLADDER SURGERY  2010   ? CARDIAC CATHETERIZATION  08/02/2016    stent x 1   ? CARDIAC ELECTROPHYSIOLOGY STUDY AND ABLATION     ? COLONOSCOPY     ? CORONARY STENT PLACEMENT     ? JOINT REPLACEMENT Bilateral     Hips   ? LUNG CANCER SURGERY  12/2016 and 01/2017    Cyber Knife-9 treatments   ? [facility] CYSTOURETHROSCOPY,FULGUR <0.5 CM LESN N/A 1/25/2016    Procedure: CYSTOSCOPY BLADDER BIOPSIES TRANSURETHRAL RESECTION BLADDER TUMOR;  Surgeon: Antoine Rodriguez MD;  Location: [facility];  Service: Urology   ? THORACOSCOPY Right 9/26/2016    Procedure:   RIGHT THORACOSCOPY/ RIGHT UPPER LOBE AND RIGHT LOWER LOBE WITH NEEDLE WIRE LOCALIZATION MULTIPLE WEDGE RESECTION NODE SAMPLING FIDUCIALS PLACEMENT;  Surgeon: Brandon Pavon MD;  Location: [facility];  Service:    ? THORACOSCOPY Right 9/26/2016    Procedure: THORACOSCOPY FOR CONTROL POST OPERATIVE BLEED;  Surgeon: Brandon Pavon MD;  Location: [facility];  Service:            History of Present Illness  Recent Health  Fever: no  Chills: no  Fatigue: no  Chest Pain: no  Cough: no  Dyspnea: yes  Urinary Frequency: no  Nausea: no  Vomiting: no  Diarrhea: no  Abdominal Pain: no  Easy Bruising: no  Lower Extremity Swelling: no  Poor Exercise Tolerance: no    Pertinent History  Prior Anesthesia: yes  Previous Anesthesia Reaction:  no  Diabetes: no  Cardiovascular Disease:  yes  Pulmonary Disease: yes, cancer   Renal Disease: no  GI Disease: no  Sleep Apnea: no  Thromboembolic Problems: no  Clotting Disorder: no  Bleeding Disorder: no  Transfusion Reaction: no  Impaired Immunity: no  Steroid use in the last 6 months: yes  Frequent Aspirin use: yes    Family history: There is no family history of abnormal reaction to anesthesia or sudden unexplained death    Review of Systems: Positives in bold  Constitutional: Fever, chills, night sweats, fainting, weight change, fatigue, seizures, dizziness, sleeping difficulties, loud snoring/pauses in breathing  Eyes: change in vision, blurred or double vision, redness/eye pain  Ears, nose, mouth, throat: change in hearing, ear pain, hoarseness, difficulty swallowing, sores in the mouth or throat  Respiratory: shortness of breath, cough, bloody sputum, wheezing  Cardiovascular: chest pain, palpitations   Gastrointestinal: abdominal pain, heartburn/indigestion, nausea/vomiting, change in appetite, change in bowel habits, constipation or diarrhea, rectal bleeding/dark stools, difficulty swallowing  Urinary: painful urination, frequent urination, urinary urgency/incontinence, blood in urine/dark urine, nocturia  MEN: pain/lump in testicles, difficulty with erections, problems with sexual function  Musculoskeletal: backache/back pain (new or increasing), weakness, joint pain/stiffness (new or increasing), muscle cramps, swelling of hands, feet, ankles, leg pain/redness  Skin: change in moles/freckles, rash, nodules  Hematologic/lymphatic: swollen lymph glands, abnormal bruising/bleeding  Endocrine: excessive thirst/urination, cold or heat intolerance  Breast: breast lump, breast pain, nipple discharge/skin changes  Neurologic/emotional: worrisome memory change, numbness/tingling, anxiety, mood swings      Objective:       Vitals:    12/11/17 1436   BP: 110/60   Pulse: 92   Weight: 172 lb (78 kg)         Physical Exam:  General Appearance: Alert,  cooperative, no distress, appears stated age  Head: Normocephalic, without obvious abnormality, atraumatic  Eyes: PERRL, conjunctiva/corneas clear, EOM's intact  Ears: Normal TM's and external ear canals, both ears  Throat: Lips, mucosa, and tongue normal; teeth and gums normal  Neck: Supple, symmetrical, trachea midline, no adenopathy;  thyroid: not enlarged, symmetric, no tenderness/mass/nodules; no carotid bruit  Lungs: Clear to auscultation bilaterally, respirations unlabored  Heart: Regular rate and rhythm, S1 and S2 normal, no murmur, rub, or gallop  Abdomen: Soft, non-tender, bowel sounds active all four quadrants,  no masses, no organomegaly  Extremities: Extremities normal, atraumatic, no cyanosis or edema  Skin: Skin color, texture, turgor normal, no rashes or lesions  Lymph nodes: Cervical, supraclavicular nodes normal  Neurologic: Normal        Recent Results (from the past 240 hour(s))   INR   Result Value Ref Range    INR 2.90 (H) 0.90 - 1.10   INR   Result Value Ref Range    INR 4.80 (H) 0.90 - 1.10   Comprehensive Metabolic Panel   Result Value Ref Range    Sodium 139 136 - 145 mmol/L    Potassium 4.9 3.5 - 5.0 mmol/L    Chloride 100 98 - 107 mmol/L    CO2 22 22 - 31 mmol/L    Anion Gap, Calculation 17 5 - 18 mmol/L    Glucose 116 70 - 125 mg/dL    BUN 37 (H) 8 - 28 mg/dL    Creatinine 1.72 (H) 0.70 - 1.30 mg/dL    GFR MDRD Af Amer 46 (L) >60 mL/min/1.73m2    GFR MDRD Non Af Amer 38 (L) >60 mL/min/1.73m2    Bilirubin, Total 1.1 (H) 0.0 - 1.0 mg/dL    Calcium 9.0 8.5 - 10.5 mg/dL    Protein, Total 7.3 6.0 - 8.0 g/dL    Albumin 3.4 (L) 3.5 - 5.0 g/dL    Alkaline Phosphatase 63 45 - 120 U/L    AST 36 0 - 40 U/L    ALT 29 0 - 45 U/L   HM1 (CBC with Diff)   Result Value Ref Range    WBC 7.7 4.0 - 11.0 thou/uL    RBC 4.72 4.40 - 6.20 mill/uL    Hemoglobin 15.0 14.0 - 18.0 g/dL    Hematocrit 45.7 40.0 - 54.0 %    MCV 97 80 - 100 fL    MCH 31.8 27.0 - 34.0 pg    MCHC 32.8 32.0 - 36.0 g/dL    RDW 14.8 (H)  11.0 - 14.5 %    Platelets 313 140 - 440 thou/uL    MPV 9.0 8.5 - 12.5 fL    Neutrophils % 88 (H) 50 - 70 %    Lymphocytes % 8 (L) 20 - 40 %    Monocytes % 3 2 - 10 %    Eosinophils % 0 0 - 6 %    Basophils % 0 0 - 2 %    Neutrophils Absolute 6.7 2.0 - 7.7 thou/uL    Lymphocytes Absolute 0.6 (L) 0.8 - 4.4 thou/uL    Monocytes Absolute 0.2 0.0 - 0.9 thou/uL    Eosinophils Absolute 0.0 0.0 - 0.4 thou/uL    Basophils Absolute 0.0 0.0 - 0.2 thou/uL           1. Bladder cancer  2. Persistent atrial fibrillation  3. Preop examination  4. Squamous cell carcinoma of right lung  5. Essential hypertension  6. Bronchiectasis without complication  7. Chronic anticoagulation, [id number] score 3  8. Coronary artery disease involving native coronary artery of native heart without angina pectoris  9. Heart failure with preserved ejection fraction  10. Dyspnea on exertion        Michelle Tello, CNP  [facility]

## 2021-06-25 NOTE — PROGRESS NOTES
Progress Notes by Trina Platt CNP at 9/11/2017  8:30 AM     Author: Trina Platt CNP Service: -- Author Type: Nurse Practitioner    Filed: 9/11/2017  9:06 AM Encounter Date: 9/11/2017 Status: Signed    : Trina Platt CNP (Nurse Practitioner)           Click to link to [facility] HEART CARE NOTE      Assessment/Recommendations   Assessment:    1.  Heart failure with preserved ejection fraction: He has no symptoms of acute retention.  He continues to have shortness of breath with minimal activity although this has improved over the past month.  He states that he felt better when he is on a higher dose of prednisone.  I am concerned his breathing is more likely related to his lung disease and heart failure at this time.    2.  Persistent atrial fibrillation: His heart rate has been well controlled between 70-80 bpm over the past month.  He continues to take the [place] and metoprolol.  He is on warfarin and has an INR next week.    Plan:  1.  Continue current medications.  2.  I recommended calling his pulmonologist to discuss shortness of breath.  3.  Continue low-sodium diet and daily weights    Gunnar will follow up with Dr. Hicks in October and in the heart failure clinic in 3 months.     History of Present Illness    Mr. Kartik Tam is a 82 y.o. male seen at [facility] heart failure clinic today for continued follow-up.  He has a history of squamous cell lung carcinoma status post CyberKnife, coronary artery disease, dyslipidemia, interstitial lung disease, persistent atrial fibrillation, and heart failure with preserved ejection fraction.  Nuclear stress test in April 2017 was negative for inducible ischemia and showed an ejection fraction of greater than 70%.  He was seen by Dr. Becerra in early August with dyspnea on exertion, lightheadedness, and increased heart rates.  His digoxin and metoprolol were changed.  He was then started on  prednisone by his pulmonologist.    Since changes in his medications about a month ago, he has noticed improvement in his breathing.  He still gets short of breath with minimal activity.  He states that he recovers quickly with sitting.  His home blood pressures have improved now around 100-120 systolically.  Heart rates are 70s-80s.  His weight has remained stable between 168 171 pounds.  He denies lightheadedness, orthopnea, chest pain, abdominal fullness/bloating and lower extremity edema.         Physical Examination Review of Systems   Vitals:    09/11/17 0835   BP: 110/62   Pulse: 76   Resp: 20     Body mass index is 24.24 kg/(m^2).  Wt Readings from Last 3 Encounters:   09/11/17 173 lb 12.8 oz (78.8 kg)   08/18/17 175 lb 1.6 oz (79.4 kg)   08/10/17 173 lb 14.4 oz (78.9 kg)       General Appearance:     Alert, cooperative and in no acute distress.   ENT/Mouth: membranes moist, no oral lesions or bleeding gums.      EYES:  no scleral icterus, normal conjunctivae   Chest/Lungs:   lungs are clear to auscultation, no rales or wheezing, respirations unlabored   Cardiovascular:    Irregularly irregular. Normal first and second heart sounds with no murmurs, rubs, or gallops; no edema bilateral lower extremities    Abdomen:  Soft, nontender, nondistended, bowel sounds present   Extremities: no cyanosis or clubbing   Skin: warm, dry.    Neurologic: mood and affect are appropriate, alert and oriented x3      General: WNL  Eyes: WNL  Ears/Nose/Throat: WNL  Lungs: WNL  Heart: Shortness of Breath with activity, Irregular Heartbeat  Stomach: WNL  Bladder: WNL  Muscle/Joints: WNL  Skin: WNL  Nervous System: WNL  Mental Health: WNL     Blood: Easy Bruising     Medical History  Surgical History Family History Social History   Past Medical History:   Diagnosis Date   ? A-fib 12/05/2014   ? Bladder cancer    ? Bronchiectasis RLL    ? Cataract    ? Colon polyp    ? Coronary artery disease    ? Dementia?    ? DJD (degenerative  joint disease) hips    ? Hearing impaired + tinnitus    ? High cholesterol    ? Hyperlipidemia    ? Hypertension     pt. denies   ? Hypothyroidism     hypo   ? Lung cancer    ? Lung nodule     RLL   ? Micturition syncope    ? Osteopenia    ? Pneumonitis     acute radiation   ? Pneumothorax    ? Pneumothorax, postoperative 11/10/2016   ? Pulmonary fibrosis    ? Shingles    ? Shortness of breath     Past Surgical History:   Procedure Laterality Date   ? APPENDECTOMY     ? Attempted lung  biopsy  04/19/2017   ? BLADDER SURGERY  2010   ? CARDIAC CATHETERIZATION  08/02/2016    stent x 1   ? CARDIAC ELECTROPHYSIOLOGY STUDY AND ABLATION     ? COLONOSCOPY     ? CORONARY STENT PLACEMENT     ? JOINT REPLACEMENT Bilateral     Hips   ? LUNG CANCER SURGERY  12/2016 and 01/2017    Cyber Knife-9 treatments   ? [place] CYSTOURETHROSCOPY,FULGUR <0.5 CM LESN N/A 1/25/2016    Procedure: CYSTOSCOPY BLADDER BIOPSIES TRANSURETHRAL RESECTION BLADDER TUMOR;  Surgeon: Antoine Rodriguez MD;  [place] [facility] [place]   ? THORACOSCOPY Right 9/26/2016    Procedure:   RIGHT THORACOSCOPY/ RIGHT UPPER LOBE AND RIGHT LOWER LOBE WITH NEEDLE WIRE LOCALIZATION MULTIPLE WEDGE RESECTION NODE SAMPLING FIDUCIALS PLACEMENT;  Surgeon: Brandon Pavon MD;  Location: [facility];  Service:    ? THORACOSCOPY Right 9/26/2016    Procedure: THORACOSCOPY FOR CONTROL POST OPERATIVE BLEED;  Surgeon: Brandon Pavon MD;  Location: [facility];  Service:     Family History   Problem Relation Age of Onset   ? Diabetes Mother    ? Heart disease Mother    ? No Medical Problems Father    ? Colon cancer Brother    ? Throat cancer Brother     Social History     Social History   ? Marital status:      Spouse name: N/A   ? Number of children: 3   ? Years of education: N/A     Occupational History   ? Not on file.     Social History Main Topics   ? Smoking status: Former Smoker     Packs/day: 3.00     Years: 30.00     Quit date:  12/5/1979   ? Smokeless tobacco: Never Used   ? Alcohol use No      Comment: alcoholism in the past, quit drinking 1972    ? Drug use: No   ? Sexual activity: Not on file     Other Topics Concern   ? Not on file     Social History Narrative          Medications  Allergies   Current Outpatient Prescriptions   Medication Sig Dispense Refill   ? aspirin 81 MG EC tablet Take 81 mg by mouth daily.     ? atorvastatin (LIPITOR) 20 MG tablet Take 1 tablet (20 mg total) by mouth at bedtime. 90 tablet 3   ? digoxin (LANOXIN) 125 mcg tablet Take 1 tablet (125 mcg total) by mouth every morning. 90 tablet 3   ? furosemide (LASIX) 40 MG tablet Take 1 tablet (40 mg total) by mouth 2 (two) times a day at 9am and 6pm. 180 tablet 3   ? levothyroxine (SYNTHROID, LEVOTHROID) 150 MCG tablet TAKE 1 TABLET (150 MCG TOTAL) BY MOUTH DAILY AT 6:00 AM. 90 tablet 2   ? metoprolol succinate (TOPROL-XL) 25 MG Take 0.5 tablets (12.5 mg total) by mouth 2 (two) times a day. 90 tablet 3   ? omeprazole (PRILOSEC) 20 MG capsule Take 1 capsule (20 mg total) by mouth daily. 90 capsule 3   ? predniSONE (DELTASONE) 20 MG tablet Take 20 mg by mouth daily.     ? warfarin (COUMADIN) 2.5 MG tablet Take 2.5mg (1 tab) on Tue Thur Sat, and 3.75mg (1 and 1/2 tabs) on all other days.  OR AS DIRECTED.  Adjust dose based on INR. 120 tablet 1     No current facility-administered medications for this visit.       Allergies   Allergen Reactions   ? Penicillins      Patient had reaction to medication 65 years ago, may or may not have been the penicillin. Had huge rash.         Lab Results    Chemistry CBC BNP   Lab Results   Component Value Date    CREATININE 1.41 (H) 08/08/2017    BUN 24 08/08/2017     08/08/2017    K 3.6 08/08/2017    CL 99 08/08/2017    CO2 29 08/08/2017     Creatinine (mg/dL)   Date Value   08/08/2017 1.41 (H)   07/26/2017 1.30   07/11/2017 1.16   06/06/2017 1.39 (H)    Lab Results   Component Value Date    WBC 6.8 04/26/2017    HGB 14.4  04/26/2017    HCT 45.5 04/26/2017    MCV 93 04/26/2017     04/26/2017    Lab Results   Component Value Date     (H) 08/08/2017     BNP (pg/mL)   Date Value   08/08/2017 229 (H)   07/26/2017 272 (H)   05/30/2017 250 (H)            Trina Platt, [facility]   Heart Failure Clinic

## 2021-06-25 NOTE — PROGRESS NOTES
Progress Notes by Smiley Becerra MD at 8/9/2017  4:20 PM     Author: Smiley Becerra MD Service: -- Author Type: Physician    Filed: 8/9/2017  5:33 PM Encounter Date: 8/9/2017 Status: Signed    : Smiley Becerra MD (Physician)           Click to link to [facility] Heart Care     [facility] HEART CARE NOTE    Thank you, Dr. Tello and Dr. Hicks, for asking the [facility] Heart Care team to see . Kartik Tam in the rapid access clinic to evaluate exertional dyspnea and lightheadedness.    Assessment/Recommendations   Assessment:    1.  Exertional dyspnea and lightheadedness.  I suspect this may be multifactorial and in part due to atrial fibrillation with rapid ventricular response associated with activity.  He also has evidence of mild O2 desaturation with activity which I suspect is due to his underlying lung disease.  I suggested we try to increase his dose of digoxin to see if we can get his heart rate better controlled.  We have to be careful not to induce dig toxicity and thus I recommended that we check a digoxin level early next week.  If we run into issues with elevated digoxin levels, we may need to consider a trial of supplemental oxygen before consideration of AV node ablation and permanent pacemaker insertion.  2.  History of chronic diastolic congestive heart failure, currently well compensated.  BNP level was 229 yesterday which is down from prior assessments.  3.  History of coronary artery disease with normal nuclear stress study one month ago.  4.  History of squamous cell lung carcinoma status post CyberKnife therapy.  Chest CT performed this morning demonstrates extensive fibrosis of the right lung which remains unchanged.  There is evidence of a tiny right apical pneumothorax.  Right pleural effusion has decreased and there has been resolution of the pleural-based lingular nodule.    Plan:  1.  Increase digoxin to 1.5 tablets daily.  If digoxin tablets cannot be cut in half, would take 1  tablet on odd days and 2 tablets on even days  2.  Return to the [facility] on Monday, August 14 for a digoxin level.  Further recommendations to follow.       History of Present Illness    Mr. Kartik Tam is a 82 y.o. male with history of squamous cell lung carcinoma status post CyberKnife therapy, coronary artery disease, hyperlipidemia, interstitial lung disease and persistent atrial fibrillation who presents to the [facility] access clinic today because of exertional dyspnea and intermittent lightheadedness.  He does have a history of atrial fibrillation which has been persistent for the last several months.  ECGs have suggested mildly elevated heart rate and he was started on digoxin 0.125 mg daily in an effort to obtain better heart rate control.  He also has been seen by his pulmonologist who noted that his oxygen levels fall with activity but he was not prescribed supplemental oxygen therapy at that time.  He does have a history of coronary artery disease disease but denies any current anginal symptoms.  A nuclear stress study in April demonstrated no evidence of ischemia or infarction.  He also has a history of diastolic congestive heart failure and was seen 1 month ago by her heart failure nurse who felt he was well compensated.  Indeed a BNP level performed yesterday was 229 which was down from his previous assessment.  He does note symptoms of exertional dyspnea with limited activity but states this does not occur on a daily basis.  He also notes some associated lightheadedness.  His base heart rate here in the office was 81 but quickly went up to 124-146 with activity.  Oxygen saturation fell from 91% to 88% with walking.    ECG (personally reviewed): ECG performed today demonstrates atrial fibrillation with ventricular rate of 81.  Nonspecific ST-T wave abnormality possibly consistent with dig effect.    ECHO (personally reviewed): No recent echo     Physical Examination Review of Systems   Vitals:     08/09/17 1607   BP: 98/68   Pulse: 90     Body mass index is 24.55 kg/(m^2).  Wt Readings from Last 3 Encounters:   08/09/17 176 lb (79.8 kg)   07/11/17 179 lb (81.2 kg)   06/30/17 178 lb (80.7 kg)     General Appearance:   Awake, Alert, No acute distress.   HEENT:  No scleral icterus; the mucous membranes were pink and moist.   Neck: No cervical bruits or jugular venous distention    Chest: The spine was straight. The chest was symmetric.   Lungs:   Respirations unlabored; the lungs are clear to auscultation. No wheezing   Cardiovascular:    Irregularly irregular rhythm.  S1, S2 normal.  No murmur, gallop or rub   Abdomen:  No organomegaly, masses, bruits, or tenderness. Bowels sounds are present   Extremities:  No peripheral edema bilaterally   Skin: No xanthelasma. Warm, Dry.   Musculoskeletal: No tenderness.   Neurologic: Mood and affect are appropriate.    General: WNL  Eyes: Visual Distubance     Lungs: Cough, Shortness of Breath  Heart: Shortness of Breath with activity  Stomach: WNL  Bladder: WNL  Muscle/Joints: WNL  Skin: WNL  Nervous System: Dizziness  Mental Health: WNL     Blood: WNL     Medical History  Surgical History Family History Social History   Past Medical History:   Diagnosis Date   ? A-fib 12/05/2014   ? Bladder cancer    ? Bronchiectasis RLL    ? Cataract    ? Colon polyp    ? Coronary artery disease    ? Dementia?    ? DJD (degenerative joint disease) hips    ? Hearing impaired + tinnitus    ? High cholesterol    ? Hyperlipidemia    ? Hypertension     pt. denies   ? Hypothyroidism     hypo   ? Lung cancer    ? Lung nodule     RLL   ? Micturition syncope    ? Osteopenia    ? Pneumonitis     acute radiation   ? Pneumothorax    ? Pneumothorax, postoperative 11/10/2016   ? Pulmonary fibrosis    ? Shingles    ? Shortness of breath     Past Surgical History:   Procedure Laterality Date   ? APPENDECTOMY     ? Attempted lung  biopsy  04/19/2017   ? BLADDER SURGERY  2010   ? CARDIAC  CATHETERIZATION  08/02/2016    stent x 1   ? CARDIAC ELECTROPHYSIOLOGY STUDY AND ABLATION     ? COLONOSCOPY     ? CORONARY STENT PLACEMENT     ? JOINT REPLACEMENT Bilateral     Hips   ? LUNG CANCER SURGERY  12/2016 and 01/2017    Cyber Knife-9 treatments   ? [place] CYSTOURETHROSCOPY,FULGUR <0.5 CM LESN N/A 1/25/2016    Procedure: CYSTOSCOPY BLADDER BIOPSIES TRANSURETHRAL RESECTION BLADDER TUMOR;  Surgeon: Antoine Rodriguez MD;  [place]: [facility] [place];  Service: Urology   ? THORACOSCOPY Right 9/26/2016    Procedure:   RIGHT THORACOSCOPY/ RIGHT UPPER LOBE AND RIGHT LOWER LOBE WITH NEEDLE WIRE LOCALIZATION MULTIPLE WEDGE RESECTION NODE SAMPLING FIDUCIALS PLACEMENT;  Surgeon: Brandon Pavon MD;  Location: [facility];  Service:    ? THORACOSCOPY Right 9/26/2016    Procedure: THORACOSCOPY FOR CONTROL POST OPERATIVE BLEED;  Surgeon: Brandon Pavon MD;  Location: [facility];  Service:     Family History   Problem Relation Age of Onset   ? Diabetes Mother    ? Heart disease Mother    ? No Medical Problems Father    ? Colon cancer Brother    ? Throat cancer Brother     Social History     Social History   ? Marital status:      Spouse name: N/A   ? Number of children: 3   ? Years of education: N/A     Occupational History   ? Not on file.     Social History Main Topics   ? Smoking status: Former Smoker     Packs/day: 3.00     Years: 30.00     Quit date: 12/5/1979   ? Smokeless tobacco: Never Used   ? Alcohol use No      Comment: alcoholism in the past, quit drinking 1972    ? Drug use: No   ? Sexual activity: Not on file     Other Topics Concern   ? Not on file     Social History Narrative          Medications  Allergies   Current Outpatient Prescriptions   Medication Sig Dispense Refill   ? aspirin 81 MG EC tablet Take 81 mg by mouth daily.     ? atorvastatin (LIPITOR) 20 MG tablet Take 1 tablet (20 mg total) by mouth at bedtime. 90 tablet 3   ? digoxin (LANOXIN) 125 mcg tablet Take 1 tablet (125  mcg total) by mouth every morning. 90 tablet 3   ? furosemide (LASIX) 40 MG tablet Take 1 tablet (40 mg total) by mouth 2 (two) times a day at 9am and 6pm. 180 tablet 3   ? levothyroxine (SYNTHROID, LEVOTHROID) 150 MCG tablet TAKE 1 TABLET (150 MCG TOTAL) BY MOUTH DAILY AT 6:00 AM. 90 tablet 2   ? metoprolol succinate (TOPROL-XL) 25 MG Take 0.5 tablets (12.5 mg total) by mouth daily. 45 tablet 3   ? omeprazole (PRILOSEC) 20 MG capsule Take 1 capsule (20 mg total) by mouth daily. 90 capsule 3   ? predniSONE (DELTASONE) 10 mg tablet Take 1 tablet (10 mg total) by mouth daily. 90 tablet 6   ? warfarin (COUMADIN) 2.5 MG tablet Take 2.5mg (1 tab) on Tue Thur Sat, and 3.75mg (1 and 1/2 tabs) on all other days.  OR AS DIRECTED.  Adjust dose based on INR. 120 tablet 1   ? warfarin (COUMADIN) 2.5 MG tablet Hold coumadin dose 3/20 and 3/21 then get repeat INR on 3/22 and PCP to resume as appropriate (Patient taking differently: Take 2.5 mg by mouth daily. Was on hold since 4/14. Previously on 2.5 mg daily. Set to restart 4/25/17 with INR check 4/31)  0     No current facility-administered medications for this visit.       Allergies   Allergen Reactions   ? Penicillins      Patient had reaction to medication 65 years ago, may or may not have been the penicillin. Had huge rash.         Lab Results    Chemistry/lipid CBC Cardiac Enzymes/BNP/TSH/INR   Lab Results   Component Value Date    CHOL 155 11/16/2016    HDL 62 11/16/2016    LDLCALC 78 11/16/2016    TRIG 74 11/16/2016    CREATININE 1.41 (H) 08/08/2017    BUN 24 08/08/2017    K 3.6 08/08/2017     08/08/2017    CL 99 08/08/2017    CO2 29 08/08/2017    Lab Results   Component Value Date    WBC 6.8 04/26/2017    HGB 14.4 04/26/2017    HCT 45.5 04/26/2017    MCV 93 04/26/2017     04/26/2017    Lab Results   Component Value Date    CKTOTAL 184 12/10/2015    CKMB 2 12/10/2016    TROPONINI 0.01 04/26/2017     (H) 08/08/2017    TSH 1.16 12/12/2016    INR 1.30  (!) 08/08/2017

## 2021-06-25 NOTE — PROGRESS NOTES
Progress Notes by Michelle Tello FNP at 2017 11:10 AM     Author: Michelle Tello FNP Service: -- Author Type: Nurse Practitioner    Filed: 5/3/2017  7:49 AM Encounter Date: 2017 Status: Signed    : Michelle Tello FNP (Nurse Practitioner)       Internal Medicine Office Visit  Patient Name: Kartik Tam  Patient Age: 82 y.o.  YOB: 1935  MRN: [id number]  ?  Date of Visit: 2017  Reason for Office Visit:   Chief Complaint   Patient presents with   ? Hospital Visit Follow Up     was seen for SOB after getting a biopsy. no SOB out of the normal       Assessment / Plan / Medical Decision Makin. Persistent atrial fibrillation  2. Pulmonary fibrosis  3. Squamous cell carcinoma of right lung  4. Diastolic congestive heart failure  5. Dyspnea on exertion  - Reviewed inpatient labs, notes, imaging  - Monitor weight closely at home, call if weight increases by 3 lb overnight, 5 lb over 1 week. Will follow up with cardiology this week, Friday. He did not  prescription for furosemide.  States that he feels fine and that weight and shortness of breath is at his baseline.  - Continue current medications. May use miralax as needed for constipation          Health Maintenance Review  Health Maintenance   Topic Date Due   ? FALL RISK ASSESSMENT  2017   ? ADVANCE DIRECTIVES DISCUSSED WITH PATIENT  2021   ? TD 18+ HE  2026   ? PNEUMOCOCCAL POLYSACCHARIDE VACCINE AGE 65 AND OVER  Completed   ? INFLUENZA VACCINE RULE BASED  Completed   ? PNEUMOCOCCAL CONJUGATE VACCINE FOR ADULTS (PCV13 OR PREVNAR)  Completed   ? ZOSTER VACCINE  Completed       I am having Mr. Tam maintain his atorvastatin, aspirin, warfarin, predniSONE, digoxin, metoprolol tartrate, omeprazole, furosemide, and levothyroxine.     HPI:   Encounter Diagnoses   Name Primary?   ? Pulmonary fibrosis Yes   ? Persistent atrial fibrillation    ? Squamous cell carcinoma of right lung    ?  Diastolic congestive heart failure    ? Dyspnea on exertion       Kartik Tam is an 82-year-old male who presents to the office today for follow-up of recent hospitalization.  He had a CT guided biopsy of a left lower lung nodule due to a new lung nodule in this area.  The next morning he started to experience significant shortness of breath which limited his ambulation to only 5 feet when he can typically walk 50 feet without becoming short of breath. BNP was elevated to 262 so he was given IV furosemide.  States that the shortness of breath improved prior to the administration of the IV furosemide.  He has not picked up a prescription to start this medication at home, is not interested in starting this due to the side effects of urinary frequency and lightheadedness when he is taking this medication in the past.  SOA is now at baseline for patient. Weight stable, he checks this every day. No tachypnea, heart palpitations.    Review of Systems: No fevers, chills. No chest pain.     Current Scheduled Meds:  Outpatient Encounter Prescriptions as of 5/1/2017   Medication Sig Dispense Refill   ? aspirin 81 MG EC tablet Take 81 mg by mouth daily.     ? atorvastatin (LIPITOR) 20 MG tablet Take 1 tablet (20 mg total) by mouth bedtime. 90 tablet 3   ? digoxin (LANOXIN) 125 mcg tablet Take 1 tablet (125 mcg total) by mouth every morning. 30 tablet 3   ? levothyroxine (SYNTHROID, LEVOTHROID) 150 MCG tablet TAKE 1 TABLET (150 MCG TOTAL) BY MOUTH DAILY AT 6:00 AM. 90 tablet 2   ? metoprolol tartrate (LOPRESSOR) 25 MG tablet Take 1 tablet (25 mg total) by mouth 2 (two) times a day. 60 tablet 3   ? omeprazole (PRILOSEC) 20 MG capsule Take 1 capsule (20 mg total) by mouth Daily before breakfast. 90 capsule 3   ? predniSONE (DELTASONE) 10 MG tablet Take 1 tablet (10 mg total) by mouth daily. 90 tablet 6   ? warfarin (COUMADIN) 2.5 MG tablet Hold coumadin dose 3/20 and 3/21 then get repeat INR on 3/22 and PCP to resume as  appropriate (Patient taking differently: Take 2.5 mg by mouth daily. Was on hold since 4/14. Previously on 2.5 mg daily. Set to restart 4/25/17 with INR check 4/31)  0   ? [DISCONTINUED] levothyroxine (SYNTHROID, LEVOTHROID) 150 MCG tablet Take 150 mcg by mouth Daily at 6:00 am.     ? furosemide (LASIX) 40 MG tablet Take 1 tablet (40 mg total) by mouth 2 (two) times a day at 9am and 6pm. 10 tablet 0     No facility-administered encounter medications on file as of 5/1/2017.      Past Medical History:   Diagnosis Date   ? A-fib 12/05/2014   ? Bladder cancer    ? Bronchiectasis RLL    ? Cataract    ? Colon polyp    ? Coronary artery disease    ? Dementia?    ? DJD (degenerative joint disease) hips    ? Hearing impaired + tinnitus    ? High cholesterol    ? Hyperlipidemia    ? Hypertension     pt. denies   ? Hypothyroidism     hypo   ? Lung cancer    ? Lung nodule     RLL   ? Micturition syncope    ? Osteopenia    ? Pneumonitis     acute radiation   ? Pneumothorax    ? Pneumothorax, postoperative 11/10/2016   ? Pulmonary fibrosis    ? Shingles    ? Shortness of breath      Past Surgical History:   Procedure Laterality Date   ? APPENDECTOMY     ? Attempted lung  biopsy  04/19/2017   ? BLADDER SURGERY  2010   ? CARDIAC CATHETERIZATION  08/02/2016    stent x 1   ? CARDIAC ELECTROPHYSIOLOGY STUDY AND ABLATION     ? COLONOSCOPY     ? CORONARY STENT PLACEMENT     ? JOINT REPLACEMENT Bilateral     Hips   ? LUNG CANCER SURGERY  12/2016 and 01/2017    Cyber Knife-9 treatments   ? MN CYSTOURETHROSCOPY,FULGUR <0.5 CM LESN N/A 1/25/2016    Procedure: CYSTOSCOPY BLADDER BIOPSIES TRANSURETHRAL RESECTION BLADDER TUMOR;  Surgeon: Antoine Rodriguez MD;  Location: [facility] OR;  Service: Urology   ? THORACOSCOPY Right 9/26/2016    Procedure:   RIGHT THORACOSCOPY/ RIGHT UPPER LOBE AND RIGHT LOWER LOBE WITH NEEDLE WIRE LOCALIZATION MULTIPLE WEDGE RESECTION NODE SAMPLING FIDUCIALS PLACEMENT;  Surgeon: Brandon Pavon MD;  Location: [facility] OR;  Service:    ? THORACOSCOPY Right 9/26/2016    Procedure: THORACOSCOPY FOR CONTROL POST OPERATIVE BLEED;  Surgeon: Brandon Pavon MD;  Location: [facility] Main OR;  Service:      Social History   Substance Use Topics   ? Smoking status: Former Smoker     Packs/day: 3.00     Years: 30.00     Quit date: 12/5/1979   ? Smokeless tobacco: Never Used   ? Alcohol use No      Comment: alcoholism in the past, quit drinking 1972        Objective / Physical Examination:  Vitals:    05/01/17 1107   BP: 110/78   Patient Site: Left Arm   Patient Position: Sitting   Cuff Size: Adult Regular   Pulse: 76   Weight: 185 lb 9.6 oz (84.2 kg)     Wt Readings from Last 3 Encounters:   05/01/17 185 lb 9.6 oz (84.2 kg)   04/26/17 179 lb 12.8 oz (81.6 kg)   04/24/17 180 lb (81.6 kg)     Body mass index is 25.89 kg/(m^2).     XR Chest PA and Lateral (Order [id number])   Imaging   Date: 4/25/2017 Department: [facility] P3 Released By/Authorizing: Macario Doe CNP (auto-released)   Study Result   XR CHEST PA AND LATERAL  4/25/2017 10:30 AM     INDICATION: dyspnea  COMPARISON: 4/24/2017     FINDINGS: Bilateral fiducial markers unchanged. Diffuse interstitial infiltrates right greater than left are stable. Volume loss right hemithorax with mediastinal shift is stable. Heart size remains normal. No acute changes.       CTA Chest PE Run (Order [id number])   Imaging   Date: 4/25/2017 Department: [facility] P3 Released By/Authorizing: Macario Doe CNP (auto-released)   Study Result   CTA CHEST PE RUN  4/25/2017 11:54 AM     INDICATION: dyspnea  TECHNIQUE: Helical acquisition through the chest was performed during the arterial phase of contrast enhancement using IV contrast. 2D and 3D reconstructions were performed by the CT technologist. Dose reduction techniques were used.   IV CONTRAST: Iohexol (Omni) 100 mL  COMPARISON: 3/12/2017     FINDINGS:  ANGIOGRAM CHEST: Negative for pulmonary emboli. Negative  for thoracic aortic dissection and aneurysms.     LUNGS AND PLEURA: Marked fibrosis with varicoid bronchiectasis throughout the right lung, and associated parenchymal opacities, mildly progressed when compared to previous. A small right pleural effusion is also larger. Postop changes in the right lung   and right sided fiducial markers again seen. There is a new fiducial marker adjacent to the lingular nodule that was recently biopsied. This nodule measures 1.5 cm. Mild peripheral fibrosis in the left lung, similar to previous. No pneumothorax.     MEDIASTINUM: There are several mildly prominent mediastinal lymph nodes, unchanged from previous. Significant coronary artery calcification.     LIMITED UPPER ABDOMEN: Negative.     MUSCULOSKELETAL: Negative.     IMPRESSION:   CONCLUSION:  1. No pulmonary embolus.  2. Stable lingular nodule, now with adjacent fiducial marker.  3. Increasing pulmonary opacities throughout the right lung, superimposed on chronic fibrosis. Findings may represent progressive fibrosis or superimposed infiltrate.       NM Pharmacologic Stress Test   Order# [id number]   Reading physician: Jennifer Lynn MD Ordering physician: Agnes Hicks MD Study date: 17   Patient Information   Patient Name MRN Sex              Age   Kartik Tam [id number] Male 1935 (82 y.o.)   EKG Scan    Scan on: 17 9:20 AM by:   Conclusion     The pharmacologic nuclear stress test is negative for inducible myocardial ischemia or infarction.    When compared to the images of 2016, there has been no significant change.       General Appearance: Alert and oriented, cooperative, affect appropriate, speech clear, in no apparent distress  Neck: Supple, trachea midline. No JVD   Lungs: Clear to auscultation bilaterally. Somewhat shallow inspirations with   Cardiovascular: Regular rate, normal S1, S2. No murmurs, rubs, or gallops  Extremities: Pulses 2+ and equal throughout. No edema. Strength equal  throughout.  Integumentary: Warm and dry. Without suspicious looking lesions  Neuro: Alert and oriented, follows commands appropriately.     No orders of the defined types were placed in this encounter.  Followup: Return in about 6 months (around 11/1/2017) for Next scheduled follow up. earlier if needed.      Michelle Tello, CNP  [facility]

## 2021-07-03 NOTE — H&P (VIEW-ONLY)
H&P (View-Only) by Jeannie Lemos MD at 1/19/2018 10:07 AM     Author: Jeannie Lemos MD Service: Cardiology Author Type: Physician    Filed: 1/19/2018 10:17 AM Date of Service: 1/19/2018 10:07 AM Status: Signed    : Jeannie Lemos MD (Physician)           Click to link to [facility]     [facility] HEART CARE NOTE        Assessment/Recommendations   Assessment:    1.  Orthostasis: Now improved after IV fluids and holding diuretics.  Continue on Lasix at 20 mg daily.  2.    Tachycardia-bradycardia: Episodes of bradycardia with 2-3 second pauses and tachycardic to 150s at times.  Will recommend permanent pacemaker placement.  Discussed with him and his wife and they are in agreement.  Plan on transferring to [facility] today.  3.  Chronic atrial fibrillation continue on Coumadin for anticoagulation.  4.  Dyspnea: Chronic dyspnea on exertion with known squamous cell lung cancer status post radiation and interstitial pulmonary fibrosis.  Followed by oncology and pulmonology.  Currently on steroids and plan for ultrasound with biopsy in a couple months.  Follow-up with Dr Hicks in 4 weeks.         History of Present Illness    Denies lightheadedness, chest pain or breathing difficulty today.  Telemetry demonstrates episodes of sinus pauses 2-1/2 seconds and bradycardia to the 40s.  Also with minimal exertion and at times lying in bed tachycardic up to 130-1 50 bpm.       Physical Examination Review of Systems   Vitals:    01/19/18 0744   BP: 117/59   Pulse: 76   Resp: 18   Temp: 98.2  F (36.8  C)   SpO2: 97%     Body mass index is 22.68 kg/(m^2).  Wt Readings from Last 3 Encounters:   01/19/18 162 lb 9.6 oz (73.8 kg)   01/15/18 164 lb (74.4 kg)   01/10/18 164 lb (74.4 kg)     General Appearance:   alert, no apparent distress   HEENT:  no scleral icterus; the mucous membranes are pink and moist                               Neck: jugular venous pressure normal   Chest: the  spine is straight and the chest is symmetric   Lungs:    Scattered wheezing   Cardiovascular:   regular rhythm with normal first and second heart sounds and no murmurs or gallops   Abdomen:  no organomegaly, masses, bruits, or tenderness; bowel sounds are present   Extremities: no edema   Skin: no xanthelasma, dry    A 12 point comprehensive review of systems was negative except as noted in the current history.       Medical History  Surgical History Family History Social History   Past Medical History:   Diagnosis Date   ? A-fib 12/05/2014   ? Bladder cancer    ? Bronchiectasis RLL    ? Cataract    ? CHF (congestive heart failure)    ? Chronic kidney disease    ? Colon polyp    ? Coronary artery disease    ? Dementia?    ? DJD (degenerative joint disease) hips    ? Hearing impaired + tinnitus    ? High cholesterol    ? History of transfusion    ? Hyperlipidemia    ? Hypertension     pt. denies   ? Hypothyroidism     hypo   ? Lung cancer    ? Lung nodule     RLL   ? Micturition syncope    ? Osteopenia    ? Pneumonitis     acute radiation   ? Pneumothorax    ? Pneumothorax, postoperative 11/10/2016   ? Pulmonary fibrosis    ? Shingles    ? Shortness of breath     SOB is his normal due to his lung disease    Past Surgical History:   Procedure Laterality Date   ? APPENDECTOMY     ? Attempted lung  biopsy  04/19/2017   ? BLADDER SURGERY  2010   ? CARDIAC CATHETERIZATION  08/02/2016    stent x 1   ? CARDIAC ELECTROPHYSIOLOGY STUDY AND ABLATION     ? COLONOSCOPY     ? JOINT REPLACEMENT Bilateral     Hips   ? LUNG CANCER SURGERY  12/2016 and 01/2017    Cyber Knife-9 treatments   ? MI CYSTOURETHROSCOPY,BIOPSY N/A 1/10/2018    Procedure: CYSTOSCOPY, WITH BLADDER BIOPSY AND FULGURATION;  Surgeon: Gentry Sullivan MD;  [facility]  Service: Urology   ? MI CYSTOURETHROSCOPY,FULGUR <0.5 CM LESN N/A 1/25/2016    Procedure: CYSTOSCOPY BLADDER BIOPSIES TRANSURETHRAL RESECTION BLADDER TUMOR;  Surgeon: Antoine BARROSO  MD Michael;  Location: [facility];  Service: Urology   ? THORACOSCOPY Right 9/26/2016    Procedure:   RIGHT THORACOSCOPY/ RIGHT UPPER LOBE AND RIGHT LOWER LOBE WITH NEEDLE WIRE LOCALIZATION MULTIPLE WEDGE RESECTION NODE SAMPLING FIDUCIALS PLACEMENT;  Surgeon: Brandon Pavon MD;  Location: [facility];  Service:    ? THORACOSCOPY Right 9/26/2016    Procedure: THORACOSCOPY FOR CONTROL POST OPERATIVE BLEED;  Surgeon: Brandon Pavon MD;  Location: [facility] OR;  Service:     Family History   Problem Relation Age of Onset   ? Diabetes Mother    ? Heart disease Mother    ? No Medical Problems Father    ? Colon cancer Brother    ? Throat cancer Brother     Social History     Social History   ? Marital status:      Spouse name: N/A   ? Number of children: 3   ? Years of education: N/A     Occupational History   ? Not on file.     Social History Main Topics   ? Smoking status: Former Smoker     Packs/day: 3.00     Years: 30.00     Quit date: 12/5/1979   ? Smokeless tobacco: Never Used   ? Alcohol use No      Comment: alcoholism in the past, quit drinking 1972    ? Drug use: No   ? Sexual activity: Not on file     Other Topics Concern   ? Not on file     Social History Narrative          Medications  Allergies   Scheduled Meds:  ? aspirin  81 mg Oral DAILY   ? atorvastatin  20 mg Oral QHS   ? furosemide  20 mg Oral DAILY   ? levothyroxine  150 mcg Oral Daily 0600   ? metoprolol tartrate  25 mg Oral BID   ? omeprazole  20 mg Oral DAILY   ? predniSONE  40 mg Oral Daily with brkfst   ? senna-docusate  1 tablet Oral BID   ? sodium chloride  3 mL Intravenous Line Care   ? sulfamethoxazole-trimethoprim  1 tablet Oral DAILY   ? warfarin - daily dose required   Other Med Consult or Protocol     Continuous Infusions:  PRN Meds:.acetaminophen, acetaminophen, bisacodyl, lidocaine, magnesium hydroxide, ondansetron **OR** ondansetron, polyethylene glycol Allergies   Allergen Reactions   ? Penicillins Rash      Patient had reaction to medication 65 years ago, may or may not have been the penicillin. Had huge rash.         Lab Results    Chemistry/lipid CBC Cardiac Enzymes/BNP/TSH/INR   Lab Results   Component Value Date    CHOL 155 11/16/2016    HDL 62 11/16/2016    LDLCALC 78 11/16/2016    TRIG 74 11/16/2016    CREATININE 1.21 01/17/2018    BUN 32 (H) 01/17/2018    K 4.9 01/17/2018     01/17/2018     01/17/2018    CO2 28 01/17/2018    Lab Results   Component Value Date    WBC 8.7 01/16/2018    HGB 12.8 (L) 01/19/2018    HCT 43.2 01/16/2018    MCV 95 01/16/2018     01/18/2018    Lab Results   Component Value Date    CKTOTAL 184 12/10/2015    CKMB 2 12/10/2016    TROPONINI 0.03 01/16/2018     (H) 01/16/2018    TSH 0.20 (L) 12/15/2017    INR 2.01 (H) 01/19/2018

## 2021-07-03 NOTE — ADDENDUM NOTE
Addendum Note by Nisreen Ramirez RN at 2/20/2018 10:41 AM     Author: Nisreen Ramirez RN Service: -- Author Type: Registered Nurse    Filed: 2/20/2018 10:41 AM Encounter Date: 2/19/2018 Status: Signed    : Nisreen Ramirez RN (Registered Nurse)    Addended by: NISREEN RAMIREZ on: 2/20/2018 10:41 AM        Modules accepted: Orders

## 2021-07-03 NOTE — ADDENDUM NOTE
Addendum Note by Leticia Montero RN at 12/18/2017  9:54 AM     Author: Leticia Montero RN Service: -- Author Type: Registered Nurse    Filed: 12/18/2017  9:54 AM Encounter Date: 8/10/2017 Status: Signed    : Leticia Montero RN (Registered Nurse)    Addended by: LETICIA MONTERO on: 12/18/2017 09:54 AM        Modules accepted: Orders

## 2021-08-03 PROBLEM — I48.91 ATRIAL FIBRILLATION WITH RVR (H): Status: RESOLVED | Noted: 2017-01-01 | Resolved: 2017-01-01

## 2021-08-03 PROBLEM — I48.91 RAPID ATRIAL FIBRILLATION (H): Status: RESOLVED | Noted: 2017-01-01 | Resolved: 2017-01-01
